# Patient Record
Sex: MALE | Race: WHITE | NOT HISPANIC OR LATINO | Employment: OTHER | URBAN - METROPOLITAN AREA
[De-identification: names, ages, dates, MRNs, and addresses within clinical notes are randomized per-mention and may not be internally consistent; named-entity substitution may affect disease eponyms.]

---

## 2017-02-15 ENCOUNTER — DOCUMENTATION ONLY (OUTPATIENT)
Dept: VASCULAR SURGERY | Facility: CLINIC | Age: 72
End: 2017-02-15

## 2017-02-21 DIAGNOSIS — I10 HYPERTENSION GOAL BP (BLOOD PRESSURE) < 140/90: ICD-10-CM

## 2017-02-21 NOTE — TELEPHONE ENCOUNTER
Potassium      Last Written Prescription Date: 07/08/2016  Last Fill Quantity: 90, # refills: 1  Last Office Visit with [facility] or [facility] prescribing provider: 11/18/2016       Potassium   Date Value Ref Range Status   11/18/2016 3.8 3.4 - 5.3 mmol/L Final     Creatinine   Date Value Ref Range Status   11/18/2016 1.01 0.66 - 1.25 mg/dL Final     BP Readings from Last 3 Encounters:   11/18/16 120/80   11/11/16 138/80   11/10/16 124/80

## 2017-02-24 RX ORDER — POTASSIUM CHLORIDE 1500 MG/1
TABLET, EXTENDED RELEASE ORAL
Qty: 90 TABLET | Refills: 1 | Status: SHIPPED | OUTPATIENT
Start: 2017-02-24 | End: 2017-04-10

## 2017-03-23 ENCOUNTER — OFFICE VISIT (OUTPATIENT)
Dept: FAMILY MEDICINE | Facility: CLINIC | Age: 72
End: 2017-03-23
Payer: MEDICARE

## 2017-03-23 VITALS
OXYGEN SATURATION: 98 % | WEIGHT: 182 LBS | DIASTOLIC BLOOD PRESSURE: 66 MMHG | TEMPERATURE: 97.6 F | BODY MASS INDEX: 27.58 KG/M2 | RESPIRATION RATE: 12 BRPM | HEIGHT: 68 IN | HEART RATE: 88 BPM | SYSTOLIC BLOOD PRESSURE: 122 MMHG

## 2017-03-23 DIAGNOSIS — H10.31 ACUTE BACTERIAL CONJUNCTIVITIS OF RIGHT EYE: Primary | ICD-10-CM

## 2017-03-23 PROCEDURE — 99213 OFFICE O/P EST LOW 20 MIN: CPT | Performed by: PHYSICIAN ASSISTANT

## 2017-03-23 RX ORDER — POLYMYXIN B SULFATE AND TRIMETHOPRIM 1; 10000 MG/ML; [USP'U]/ML
1 SOLUTION OPHTHALMIC EVERY 4 HOURS
Qty: 1 BOTTLE | Refills: 0 | Status: SHIPPED | OUTPATIENT
Start: 2017-03-23 | End: 2017-03-27

## 2017-03-23 NOTE — PATIENT INSTRUCTIONS
What Is Conjunctivitis?  Conjunctivitis is an irritation or infection. It affects the membrane that covers the white of your eye and the inside of your eyelid (conjunctiva). It can happen to one or both eyes. The membrane swells and the blood vessels enlarge (dilate). This makes your eye red. That's why conjunctivitis is sometimes called red eye or pink eye.    What are the symptoms?  If you have one or more of these symptoms, see an eye doctor:    Redness in and around your eye    Eyes that are puffy and sore    Itching, burning, or stinging eyes    Watery eyes or discharge from your eye    Eyelids that are crusty or stuck together when you wake up in the morning    Pink color in the whites of one or both eyes  Getting treatment quickly can help prevent damage to your eyes.  How is it diagnosed?  Conjunctivitis is usually a minor eye infection. But it can sometimes become a more serious problem. Some more serious eye diseases have symptoms that look like conjunctivitis. So it's important for an eye doctor to diagnose you. Your eye doctor will ask about your symptoms and any medicines you take. He or she will ask about any illnesses or medical conditions you may have. The doctor will also check your eyes with a hand-held light and a special microscope called a slit lamp.    3895-1459 [employer]. [address] [place] [address], [place] [address]. All rights reserved. This information is not intended as a substitute for professional medical care. Always follow your healthcare professional's instructions.

## 2017-03-23 NOTE — MR AVS SNAPSHOT
After Visit Summary   3/23/2017    Steven Duong    MRN: [id number]           Patient Information     Date Of Birth          1945        Visit Information        Provider Department      3/23/2017 3:00 PM Perla Gamboa PA-C [facility] [place]        Today's Diagnoses     Acute bacterial conjunctivitis of right eye    -  1      Care Instructions      What Is Conjunctivitis?  Conjunctivitis is an irritation or infection. It affects the membrane that covers the white of your eye and the inside of your eyelid (conjunctiva). It can happen to one or both eyes. The membrane swells and the blood vessels enlarge (dilate). This makes your eye red. That's why conjunctivitis is sometimes called red eye or pink eye.    What are the symptoms?  If you have one or more of these symptoms, see an eye doctor:    Redness in and around your eye    Eyes that are puffy and sore    Itching, burning, or stinging eyes    Watery eyes or discharge from your eye    Eyelids that are crusty or stuck together when you wake up in the morning    Pink color in the whites of one or both eyes  Getting treatment quickly can help prevent damage to your eyes.  How is it diagnosed?  Conjunctivitis is usually a minor eye infection. But it can sometimes become a more serious problem. Some more serious eye diseases have symptoms that look like conjunctivitis. So it's important for an eye doctor to diagnose you. Your eye doctor will ask about your symptoms and any medicines you take. He or she will ask about any illnesses or medical conditions you may have. The doctor will also check your eyes with a hand-held light and a special microscope called a slit lamp.    0386-0831 [employer]. [address] [place] [address], [place] [address]. All rights reserved. This information is not intended as a substitute for professional medical care. Always follow your healthcare professional's instructions.               "Follow-ups after your visit        Who to contact     If you have questions or need follow up information about today's clinic visit or your schedule please contact [facility] directly at [phone].  Normal or non-critical lab and imaging results will be communicated to you by MyChart, letter or phone within 4 business days after the clinic has received the results. If you do not hear from us within 7 days, please contact the clinic through [employer] or phone. If you have a critical or abnormal lab result, we will notify you by phone as soon as possible.  Submit refill requests through [employer] or call your pharmacy and they will forward the refill request to us. Please allow 3 business days for your refill to be completed.          Additional Information About Your Visit        [employer] Information     [employer] gives you secure access to your electronic health record. If you see a primary care provider, you can also send messages to your care team and make appointments. If you have questions, please call your primary care clinic.  If you do not have a primary care provider, please call [phone] and they will assist you.        Care EveryWhere ID     This is your Care EveryWhere ID. This could be used by other organizations to access your [facility] medical records  [id number]        Your Vitals Were     Pulse Temperature Respirations Height Pulse Oximetry BMI (Body Mass Index)    88 97.6  F (36.4  C) (Tympanic) 12 5' 8\" (1.727 m) 98% 27.67 kg/m2       Blood Pressure from Last 3 Encounters:   03/23/17 122/66   11/18/16 120/80   11/11/16 138/80    Weight from Last 3 Encounters:   03/23/17 182 lb (82.6 kg)   11/18/16 179 lb (81.2 kg)   11/11/16 181 lb (82.1 kg)              Today, you had the following     No orders found for display         Today's Medication Changes          These changes are accurate as of: 3/23/17  3:25 PM.  If you have any questions, ask your nurse or doctor.             "   Start taking these medicines.        Dose/Directions    trimethoprim-polymyxin b ophthalmic solution   Commonly known as:  POLYTRIM   Used for:  Acute bacterial conjunctivitis of right eye   Started by:  Perla Gamboa PA-C        Dose:  1 drop   Apply 1 drop to eye every 4 hours for 7 days   Quantity:  1 Bottle   Refills:  0            Where to get your medicines      These medications were sent to [facility] [address] [facility]  [address] [place] [address], [facility] [address]     Phone:  [phone]     trimethoprim-polymyxin b ophthalmic solution                Primary Care Provider Office Phone # Fax #    Nash Walker [phone]       [address] [address]        Thank you!     Thank you for choosing [facility]  for your care. Our goal is always to provide you with excellent care. Hearing back from our patients is one way we can continue to improve our services. Please take a few minutes to complete the written survey that you may receive in the mail after your visit with us. Thank you!             Your Updated Medication List - Protect others around you: Learn how to safely use, store and throw away your medicines at www.disposemymeds.org.          This list is accurate as of: 3/23/17  3:25 PM.  Always use your most recent med list.                   Brand Name Dispense Instructions for use    aspirin 81 MG tablet     100    ONE DAILY       atorvastatin 10 MG tablet    LIPITOR    90 tablet    Take 0.5 tablets (5 mg) by mouth daily       chlorthalidone 25 MG tablet    HYGROTON    90 tablet    TAKE 1 TABLET DAILY       fluticasone 50 MCG/ACT spray    FLONASE    48 g    Spray 1-2 sprays into both nostrils daily       losartan 50 MG tablet    COZAAR    90 tablet    Take 1 tablet (50 mg) by mouth daily       LUTEIN PO          multivitamin per tablet     100    ONE DAILY        potassium chloride 20 MEQ CR tablet   Generic drug:  potassium chloride SA     90 tablet    TAKE 1 TABLET DAILY       trimethoprim-polymyxin b ophthalmic solution    POLYTRIM    1 Bottle    Apply 1 drop to eye every 4 hours for 7 days       VITAMIN D PO      Take  by mouth.

## 2017-03-23 NOTE — NURSING NOTE
"Chief Complaint   Patient presents with     Eye Problem       Initial /66  Pulse 88  Temp 97.6  F (36.4  C) (Tympanic)  Resp 12  Ht 5' 8\" (1.727 m)  Wt 182 lb (82.6 kg)  SpO2 98%  BMI 27.67 kg/m2 Estimated body mass index is 27.67 kg/(m^2) as calculated from the following:    Height as of this encounter: 5' 8\" (1.727 m).    Weight as of this encounter: 182 lb (82.6 kg).  Medication Reconciliation: complete   Isa Bloedow LPN    "

## 2017-03-23 NOTE — PROGRESS NOTES
SUBJECTIVE:                                                    Steven Duong is a 71 year old male who presents to clinic today for the following health issues:       Eye(s) Problem     Onset: x 1 day    Description:   Location: right  Pain: NO, but he does feel discomfort like he has an eyelash in his eye.   Redness: no    Accompanying Signs & Symptoms:  Discharge/mattering: YES  Swelling: YES  Visual changes: no  Fever: no  Nasal Congestion: no  Bothered by bright lights: no     History:   Trauma: no   Foreign body exposure: no     Precipitating factors:   Wearing contacts: no    Alleviating factors:  Improved by: saline       Therapies Tried and outcome: Saline with improvedment      Problem list and histories reviewed & adjusted, as indicated.  Additional history: as documented    Patient Active Problem List   Diagnosis     Meniere's disease     Seasonal allergic rhinitis     Family history of coronary artery disease     Hyperlipidemia LDL goal <130     Hypertension goal BP (blood pressure) < 140/90     Advanced directives, counseling/discussion     Personal history of prostate cancer - S/p prostatectomy at 54 yo (2001)     Prediabetes     Chronic right-sided low back pain with right-sided sciatica     Past Surgical History:   Procedure Laterality Date     SUPRAPUBIC PROSTATECTOMY  2001     TONSILLECTOMY  1953       Social History   Substance Use Topics     Smoking status: Never Smoker     Smokeless tobacco: Never Used     Alcohol use 7.0 oz/week      Comment: 1-2 beers nightly     Family History   Problem Relation Age of Onset     Coronary Artery Disease Father 64     bypass x 2 (initial at 65 yo)     Coronary Artery Disease Paternal Grandfather      CEREBROVASCULAR DISEASE Mother      with associated parkinsons     Colon Cancer No family hx of      Prostate Cancer Father 70     Melanoma Daughter      melanoma     Breast Cancer Daughter          Current Outpatient Prescriptions   Medication Sig Dispense  Refill     POTASSIUM CHLORIDE 20 MEQ CR tablet TAKE 1 TABLET DAILY 90 tablet 1     chlorthalidone (HYGROTON) 25 MG tablet TAKE 1 TABLET DAILY 90 tablet 1     atorvastatin (LIPITOR) 10 MG tablet Take 0.5 tablets (5 mg) by mouth daily 90 tablet 3     chlorthalidone (HYGROTON) 25 MG tablet Take 1 tablet (25 mg) by mouth daily 90 tablet 3     losartan (COZAAR) 50 MG tablet Take 1 tablet (50 mg) by mouth daily 90 tablet 3     Cholecalciferol (VITAMIN D PO) Take  by mouth.       ASPIRIN 81 MG OR TABS ONE DAILY 100 3     MULTIVITAMINS OR TABS ONE DAILY 100 1 YEAR     fluticasone (FLONASE) 50 MCG/ACT nasal spray Spray 1-2 sprays into both nostrils daily 48 g 3     LUTEIN PO        Allergies   Allergen Reactions     Penicillin [Esters]      Throat swelling     Penicillins      Other reaction(s): Throat Swelling/Closing       Reviewed and updated as needed this visit by clinical staff       Reviewed and updated as needed this visit by Provider       ROS:  C: NEGATIVE for fever, chills, change in weight  INTEGUMENTARY/SKIN: NEGATIVE for worrisome rashes, moles or lesions  EYE: POSITIVE for eye swelling and discharge  E/M: NEGATIVE for sore throat, ear or sinus problems  R: NEGATIVE for cough  CV: NEGATIVE for chest pain, palpitations or peripheral edema  GI: NEGATIVE for nausea, abdominal pain, heartburn, or change in bowel habits  : NEGATIVE for dysuria, hematuria, decreased urinary stream or discharge  MUSCULOSKELETAL: NEGATIVE for significant arthralgias or myalgia  NEURO: NEGATIVE for weakness, dizziness or paresthesias  ENDOCRINE: NEGATIVE for cold intolerance, HX diabetes and HX thyroid disease  HEME/ALLERGY/IMMUNE: NEGATIVE for swollen nodes      OBJECTIVE:                                                      GENERAL: healthy, alert and no distress  EYES: PERRL, EOMI and conjunctiva/corneas- conjunctival injection right. Flouroscien stain negative.   HENT: normal cephalic/atraumatic, ear canals and TM's normal, nose  and mouth without ulcers or lesions, oropharynx clear and oral mucous membranes moist  NECK: no adenopathy, no asymmetry, masses, or scars and thyroid normal to palpation  RESP: lungs clear to auscultation - no rales, rhonchi or wheezes  CV: regular rate and rhythm, normal S1 S2, no S3 or S4, no murmur, click or rub, no peripheral edema and peripheral pulses strong  MS: no gross musculoskeletal defects noted, no edema    Diagnostic Test Results:  none      ASSESSMENT/PLAN:                                                    1. Acute bacterial conjunctivitis of right eye  Most likely viral pink eye at this time, patient's symptoms consistent with viral. High incidence of bacterial coinfection. Can also use lubricating drops (Systane.) Practice good hand hygiene, wash all pillowcases and towels. Spoke about contagious nature of pink eye. May use drops between 5-7 days.     - trimethoprim-polymyxin b (POLYTRIM) ophthalmic solution; Apply 1 drop to eye every 4 hours for 7 days  Dispense: 1 Bottle; Refill: 0    I have discussed any lab or imaging results, the patient's diagnosis, and my plan of treatment with the patient and/or family. Patient is aware to come back in with worsening symptoms or if no relief despite treatment plan.  Patient voiced understanding and had no further questions.     Perla Gamboa PA-C  [facility] [place]

## 2017-03-27 ENCOUNTER — OFFICE VISIT (OUTPATIENT)
Dept: FAMILY MEDICINE | Facility: CLINIC | Age: 72
End: 2017-03-27
Payer: MEDICARE

## 2017-03-27 VITALS
HEIGHT: 68 IN | TEMPERATURE: 98 F | HEART RATE: 92 BPM | WEIGHT: 180 LBS | DIASTOLIC BLOOD PRESSURE: 78 MMHG | BODY MASS INDEX: 27.28 KG/M2 | SYSTOLIC BLOOD PRESSURE: 110 MMHG | OXYGEN SATURATION: 96 %

## 2017-03-27 DIAGNOSIS — H10.021 PINK EYE DISEASE OF RIGHT EYE: ICD-10-CM

## 2017-03-27 DIAGNOSIS — H00.021 HORDEOLUM INTERNUM OF RIGHT UPPER EYELID: Primary | ICD-10-CM

## 2017-03-27 PROCEDURE — 99213 OFFICE O/P EST LOW 20 MIN: CPT | Performed by: FAMILY MEDICINE

## 2017-03-27 RX ORDER — GENTAMICIN SULFATE 3 MG/ML
1 SOLUTION/ DROPS OPHTHALMIC EVERY 4 HOURS
Qty: 5 ML | Refills: 0 | Status: SHIPPED | OUTPATIENT
Start: 2017-03-27 | End: 2017-04-03

## 2017-03-27 ASSESSMENT — ANXIETY QUESTIONNAIRES

## 2017-03-27 ASSESSMENT — PATIENT HEALTH QUESTIONNAIRE - PHQ9: 5. POOR APPETITE OR OVEREATING: NOT AT ALL

## 2017-03-27 NOTE — NURSING NOTE
"Chief Complaint   Patient presents with     RECHECK       Initial /78 (BP Location: Right arm, Patient Position: Chair, Cuff Size: Adult Large)  Pulse 92  Temp 98  F (36.7  C) (Oral)  Ht 5' 8\" (1.727 m)  Wt 180 lb (81.6 kg)  SpO2 96%  BMI 27.37 kg/m2 Estimated body mass index is 27.37 kg/(m^2) as calculated from the following:    Height as of this encounter: 5' 8\" (1.727 m).    Weight as of this encounter: 180 lb (81.6 kg).  Medication Reconciliation: complete  "

## 2017-03-27 NOTE — PROGRESS NOTES
"  SUBJECTIVE:                                                    Steven Duong is a 72 year old male who presents to clinic today for the following health issues:    Recheck right eye LOV 03/23/2017 4 days go- Reports at onset eye felt as if there was something in it- pt using the drops but symptoms are worsened- red swollen and crusty, patient has been washing eye with warm water in the morning, reports he has had allergies and intermittent sinus symptoms the last few weeks, no trauma, no symptoms of left eye    Problem list and histories reviewed & adjusted, as indicated.  Additional history: as documented    ROS:  Constitutional, HEENT, cardiovascular, pulmonary, GI, , musculoskeletal, neuro, skin, endocrine and psych systems are negative, except as otherwise noted.    This document serves as a record of the services and decisions personally performed and made by Nash Walker MD. It was created on his behalf by Marifer Lew, a trained medical scribe. The creation of this document is based the provider's statements to the medical scribe.  Marifer Lew   OBJECTIVE:                                                    /78 (BP Location: Right arm, Patient Position: Chair, Cuff Size: Adult Large)  Pulse 92  Temp 98  F (36.7  C) (Oral)  Ht 1.727 m (5' 8\")  Wt 81.6 kg (180 lb)  SpO2 96%  BMI 27.37 kg/m2 Body mass index is 27.37 kg/(m^2).   GENERAL: healthy, alert, well nourished, well hydrated, no distress  EYES: edema and mild erythema of right upper lid edema, otherwise, Eyes grossly normal to inspection, extraocular movements - intact  PSYCH: Alert and oriented times 3; speech- coherent , normal rate and volume; able to articulate logical thoughts, able to abstract reason, no tangential thoughts, no hallucinations or delusions, affect- normal  Diagnostic test results: none      ASSESSMENT/PLAN:         Steven was seen today for recheck.    Diagnoses and all orders for this visit:    Francisco Javier " internum of right upper eyelid  Pink eye disease of right eye: Advised to use warm compress a few times a day and sleep propped up to help alleviate symptoms. Patient given a handout on eyelid care.  -     gentamicin (GARAMYCIN) 0.3 % ophthalmic solution; Apply 1 drop to eye every 4 hours for 7 days    Risks, benefits and alternatives of treatments discussed. Plan agreed on.      Followup: prn    Will call, return to clinic, or go to ED if worsening or symptoms not improving as discussed.    See patient instructions.     Health Maintenance Topics with due status: Overdue       Topic Date Due    ANSELMO QUESTIONNAIRE 1 YEAR 1945    PHQ-9 Q1YR (NO INBASKET) 1945       Health maintenance reviewed/updated? Yes    The information in this document, created by the medical scribe for me, accurately reflects the services I personally performed and the decisions made by me. I have reviewed and approved this document for accuracy prior to leaving the patient care area.  Nash Walker MD March 27, 2017 2:27 PM        Joaquin Walker MD

## 2017-03-27 NOTE — MR AVS SNAPSHOT
After Visit Summary   3/27/2017    Steven Duong    MRN: [id number]           Patient Information     Date Of Birth          1945        Visit Information        Provider Department      3/27/2017 2:00 PM Nash Walker MD [facility] [place]        Today's Diagnoses     Hordeolum internum of right upper eyelid    -  1    Pink eye disease of right eye          Care Instructions      Treating Blepharitis: Self-Care  To treat the problem, keep your eyelids clean. Warm compresses can reduce redness and swelling, and help clean your eyelids, too. You may also need to wash the area gently with an eyelid scrub when you wake up.    To apply a warm compress:  1. Wash your hands with soap and warm water.  2. Wet a clean washcloth with warm water. Then wring it out.  3. Close your eyes and place the washcloth over your eyelids for 3 to 5 minutes. This helps loosen scales or crusts.  4. Wet the washcloth again as often as needed to keep it warm.  Repeat 2 or more times a day. Use a clean washcloth each time.    To use an eyelid scrub:  1. Wash your hands with soap and warm water.  2. Use a ready-made eyelid scrub. Or mix 3 drops of baby shampoo in 1/4 cup of warm water.  3. Dip a lint-free pad, cotton swab, or clean washcloth in the scrub.  4. Close one eye and gently scrub the base of the eyelid.  5. Rinse the lid in cool water and dry with a clean towel.  6. Repeat on your other eye.    4901-8886 [employer]. [address] [place], [place] [address]. All rights reserved. This information is not intended as a substitute for professional medical care. Always follow your healthcare professional's instructions.              Follow-ups after your visit        Follow-up notes from your care team     Return in about 1 week (around 4/3/2017), or if symptoms worsen or fail to improve.      Who to contact     If you have questions or need follow up information about today's clinic visit  "or your schedule please contact [facility] directly at [phone].  Normal or non-critical lab and imaging results will be communicated to you by MyChart, letter or phone within 4 business days after the clinic has received the results. If you do not hear from us within 7 days, please contact the clinic through [employer] or phone. If you have a critical or abnormal lab result, we will notify you by phone as soon as possible.  Submit refill requests through [employer] or call your pharmacy and they will forward the refill request to us. Please allow 3 business days for your refill to be completed.          Additional Information About Your Visit        [employer] Information     [employer] gives you secure access to your electronic health record. If you see a primary care provider, you can also send messages to your care team and make appointments. If you have questions, please call your primary care clinic.  If you do not have a primary care provider, please call [phone] and they will assist you.        Care EveryWhere ID     This is your Care EveryWhere ID. This could be used by other organizations to access your [facility] medical records  [id number]        Your Vitals Were     Pulse Temperature Height Pulse Oximetry BMI (Body Mass Index)       92 98  F (36.7  C) (Oral) 5' 8\" (1.727 m) 96% 27.37 kg/m2        Blood Pressure from Last 3 Encounters:   03/27/17 110/78   03/23/17 122/66   11/18/16 120/80    Weight from Last 3 Encounters:   03/27/17 180 lb (81.6 kg)   03/23/17 182 lb (82.6 kg)   11/18/16 179 lb (81.2 kg)              Today, you had the following     No orders found for display         Today's Medication Changes          These changes are accurate as of: 3/27/17  2:37 PM.  If you have any questions, ask your nurse or doctor.               Start taking these medicines.        Dose/Directions    gentamicin 0.3 % ophthalmic solution   Commonly known as:  GARAMYCIN   Used for:  Pink eye disease " of right eye, Hordeolum internum of right upper eyelid   Started by:  Nash Walker MD        Dose:  1 drop   Apply 1 drop to eye every 4 hours for 7 days   Quantity:  5 mL   Refills:  0         Stop taking these medicines if you haven't already. Please contact your care team if you have questions.     trimethoprim-polymyxin b ophthalmic solution   Commonly known as:  POLYTRIM   Stopped by:  Nash Walker MD                Where to get your medicines      These medications were sent to [facility] - [address] [address]     Phone:  [phone]     gentamicin 0.3 % ophthalmic solution                Primary Care Provider Office Phone # Fax #    Nash Walker [phone]       [address] [address]        Thank you!     Thank you for choosing [facility]  for your care. Our goal is always to provide you with excellent care. Hearing back from our patients is one way we can continue to improve our services. Please take a few minutes to complete the written survey that you may receive in the mail after your visit with us. Thank you!             Your Updated Medication List - Protect others around you: Learn how to safely use, store and throw away your medicines at www.disposemymeds.org.          This list is accurate as of: 3/27/17  2:37 PM.  Always use your most recent med list.                   Brand Name Dispense Instructions for use    aspirin 81 MG tablet     100    ONE DAILY       atorvastatin 10 MG tablet    LIPITOR    90 tablet    Take 0.5 tablets (5 mg) by mouth daily       chlorthalidone 25 MG tablet    HYGROTON    90 tablet    TAKE 1 TABLET DAILY       fluticasone 50 MCG/ACT spray    FLONASE    48 g    Spray 1-2 sprays into both nostrils daily       gentamicin 0.3 % ophthalmic solution    GARAMYCIN    5 mL    Apply 1 drop to eye  every 4 hours for 7 days       losartan 50 MG tablet    COZAAR    90 tablet    Take 1 tablet (50 mg) by mouth daily       LUTEIN PO          multivitamin per tablet     100    ONE DAILY       potassium chloride 20 MEQ CR tablet   Generic drug:  potassium chloride SA     90 tablet    TAKE 1 TABLET DAILY       VITAMIN D PO      Take  by mouth.

## 2017-03-28 ASSESSMENT — ANXIETY QUESTIONNAIRES: GAD7 TOTAL SCORE: 0

## 2017-03-28 ASSESSMENT — PATIENT HEALTH QUESTIONNAIRE - PHQ9: SUM OF ALL RESPONSES TO PHQ QUESTIONS 1-9: 1

## 2017-04-07 DIAGNOSIS — I10 HYPERTENSION GOAL BP (BLOOD PRESSURE) < 140/90: ICD-10-CM

## 2017-04-07 RX ORDER — LOSARTAN POTASSIUM 50 MG/1
TABLET ORAL
Qty: 90 TABLET | Refills: 2 | OUTPATIENT
Start: 2017-04-07

## 2017-04-10 DIAGNOSIS — E78.5 HYPERLIPIDEMIA LDL GOAL <130: ICD-10-CM

## 2017-04-10 DIAGNOSIS — I10 HYPERTENSION GOAL BP (BLOOD PRESSURE) < 140/90: ICD-10-CM

## 2017-04-10 DIAGNOSIS — I10 ESSENTIAL HYPERTENSION WITH GOAL BLOOD PRESSURE LESS THAN 140/90: ICD-10-CM

## 2017-04-10 NOTE — TELEPHONE ENCOUNTER
Please change all 4 of these to [facility].   Also needs: Atorvastatin 10 mg.  Chlorthalidone 25 mg, Losartan 50 MG, & Potassium 20 MEQ CR     [phone], if any questions

## 2017-04-11 NOTE — TELEPHONE ENCOUNTER
Losartan      Last Written Prescription Date: 11/18/2016  Last Fill Quantity: 90, # refills: 3  Last Office Visit with [facility], [facility] or  Health prescribing provider: 03/27/2017       Potassium   Date Value Ref Range Status   11/18/2016 3.8 3.4 - 5.3 mmol/L Final     Creatinine   Date Value Ref Range Status   11/18/2016 1.01 0.66 - 1.25 mg/dL Final     BP Readings from Last 3 Encounters:   03/27/17 110/78   03/23/17 122/66   11/18/16 120/80       Atorvastain     Last Written Prescription Date: 11/18/2016  Last Fill Quantity: 90, # refills: 3  Last Office Visit with [facility], [facility] or  Health prescribing provider: 03/27/2017       Lab Results   Component Value Date    CHOL 162 11/18/2016     Lab Results   Component Value Date    HDL 61 11/18/2016     Lab Results   Component Value Date    LDL 86 11/18/2016     Lab Results   Component Value Date    TRIG 74 11/18/2016     Lab Results   Component Value Date    CHOLHDLRATIO 2.8 11/16/2015       Potassium      Last Written Prescription Date: 02/24/2017  Last Fill Quantity: 90, # refills: 1  Last Office Visit with [facility], [facility] or [facility] prescribing provider: 03/27/2017       Potassium   Date Value Ref Range Status   11/18/2016 3.8 3.4 - 5.3 mmol/L Final     Creatinine   Date Value Ref Range Status   11/18/2016 1.01 0.66 - 1.25 mg/dL Final     BP Readings from Last 3 Encounters:   03/27/17 110/78   03/23/17 122/66   11/18/16 120/80     Hygroton      Last Written Prescription Date: 12/30/2016  Last Fill Quantity: 90, # refills: 1  Last Office Visit with [facility], [facility] or  Health prescribing provider: 03/27/2017       Potassium   Date Value Ref Range Status   11/18/2016 3.8 3.4 - 5.3 mmol/L Final     Creatinine   Date Value Ref Range Status   11/18/2016 1.01 0.66 - 1.25 mg/dL Final     BP Readings from Last 3 Encounters:   03/27/17 110/78   03/23/17 122/66   11/18/16 120/80

## 2017-04-12 RX ORDER — LOSARTAN POTASSIUM 50 MG/1
50 TABLET ORAL DAILY
Qty: 90 TABLET | Refills: 3 | Status: SHIPPED | OUTPATIENT
Start: 2017-04-12 | End: 2017-06-09

## 2017-04-12 RX ORDER — POTASSIUM CHLORIDE 1500 MG/1
20 TABLET, EXTENDED RELEASE ORAL DAILY
Qty: 90 TABLET | Refills: 1 | Status: SHIPPED | OUTPATIENT
Start: 2017-04-12 | End: 2017-11-05

## 2017-04-12 RX ORDER — ATORVASTATIN CALCIUM 10 MG/1
5 TABLET, FILM COATED ORAL DAILY
Qty: 90 TABLET | Refills: 3 | Status: SHIPPED | OUTPATIENT
Start: 2017-04-12 | End: 2018-01-16

## 2017-04-12 RX ORDER — CHLORTHALIDONE 25 MG/1
25 TABLET ORAL DAILY
Qty: 90 TABLET | Refills: 1 | Status: SHIPPED | OUTPATIENT
Start: 2017-04-12 | End: 2017-06-09

## 2017-04-12 NOTE — TELEPHONE ENCOUNTER
Prescription approved per [facility] Refill Protocol.    Kely Silvestre RN    [facility] [place]

## 2017-05-16 ENCOUNTER — TRANSFERRED RECORDS (OUTPATIENT)
Dept: HEALTH INFORMATION MANAGEMENT | Facility: CLINIC | Age: 72
End: 2017-05-16

## 2017-06-09 ENCOUNTER — TELEPHONE (OUTPATIENT)
Dept: FAMILY MEDICINE | Facility: CLINIC | Age: 72
End: 2017-06-09

## 2017-06-09 DIAGNOSIS — I10 HYPERTENSION GOAL BP (BLOOD PRESSURE) < 140/90: ICD-10-CM

## 2017-06-09 DIAGNOSIS — I10 ESSENTIAL HYPERTENSION WITH GOAL BLOOD PRESSURE LESS THAN 140/90: ICD-10-CM

## 2017-06-09 RX ORDER — LOSARTAN POTASSIUM 50 MG/1
50 TABLET ORAL DAILY
Qty: 30 TABLET | Refills: 0 | Status: SHIPPED | OUTPATIENT
Start: 2017-06-09 | End: 2018-01-16

## 2017-06-09 RX ORDER — CHLORTHALIDONE 25 MG/1
25 TABLET ORAL DAILY
Qty: 30 TABLET | Refills: 0 | Status: SHIPPED | OUTPATIENT
Start: 2017-06-09 | End: 2018-01-16

## 2017-06-09 NOTE — TELEPHONE ENCOUNTER
Amarjit wife calling he is out of town and out of state. He   Just got notified that his daughter has stage 4 cancer   She doesn't want him to go with out his meds.  Please call her at [phone].

## 2017-06-09 NOTE — TELEPHONE ENCOUNTER
Put 30 days through to Shi in [facility]. Spoke to pts wife at .    Aide Nguyen RN

## 2017-07-03 DIAGNOSIS — J30.2 SEASONAL ALLERGIC RHINITIS: Primary | ICD-10-CM

## 2017-07-03 RX ORDER — FLUTICASONE PROPIONATE 50 MCG
1-2 SPRAY, SUSPENSION (ML) NASAL DAILY
Qty: 48 G | Refills: 3 | Status: SHIPPED | OUTPATIENT
Start: 2017-07-03 | End: 2018-01-16

## 2017-07-03 NOTE — TELEPHONE ENCOUNTER
Flonase      Last Written Prescription Date: 11/18/2016  Last Fill Quantity: 48g,  # refills: 3   Last Office Visit with [facility], [facility] or [facility] prescribing provider: 03/27/2017

## 2017-07-03 NOTE — TELEPHONE ENCOUNTER
Reason for Call:  Other prescription - refill of fluticasone propionate    Detailed comments: Patient requesting a refill of fluticasone.    Phone Number Patient can be reached at: Cell number on file:    Telephone Information:   Mobile [phone]       Best Time: any    Can we leave a detailed message on this number? YES    Call taken on 7/3/2017 at 10:31 AM by Viridiana Triplett

## 2017-11-05 DIAGNOSIS — I10 HYPERTENSION GOAL BP (BLOOD PRESSURE) < 140/90: ICD-10-CM

## 2017-11-06 RX ORDER — POTASSIUM CHLORIDE 1500 MG/1
TABLET, EXTENDED RELEASE ORAL
Qty: 90 TABLET | Refills: 0 | Status: SHIPPED | OUTPATIENT
Start: 2017-11-06 | End: 2018-01-16

## 2017-11-06 NOTE — TELEPHONE ENCOUNTER
lov 3/27/17.    Medication is being filled for 1 time refill only due to:  Patient needs to be seen because due for phx.   Aide Nguyen RN  [facility]

## 2018-01-12 NOTE — PROGRESS NOTES
SUBJECTIVE:   Steven Duong is a 72 year old male who presents for Preventive Visit.    Are you in the first 12 months of your Medicare Part B coverage?  No    Healthy Habits:    Do you get at least three servings of calcium containing foods daily (dairy, green leafy vegetables, etc.)? yes    Amount of exercise or daily activities, outside of work: 3-4 day(s) per week    Problems taking medications regularly No    Medication side effects: No    Have you had an eye exam in the past two years? yes    Do you see a dentist twice per year? yes    Do you have sleep apnea, excessive snoring or daytime drowsiness?no      Ability to successfully perform activities of daily living: Yes, no assistance needed    Home safety:  none identified     Hearing impairment: Yes, hearing aid in right ear    Fall risk:  Fallen 2 or more times in the past year?: No  Any fall with injury in the past year?: No      click delete button to remove this line now  COGNITIVE SCREEN  1) Repeat 3 items (Banana, Sunrise, Chair)    2) Clock draw: NORMAL  3) 3 item recall: Recalls 3 objects  Results: 3 items recalled: COGNITIVE IMPAIRMENT LESS LIKELY    Mini-CogTM Copyright S [employer]. Licensed by the author for use in [facility] [employer]; reprinted with permission ([email or web address]). All rights reserved.          Hyperlipidemia Follow-Up    Rate your low fat/cholesterol diet?: good    Taking statin?  Yes, no muscle aches from statin    Other lipid medications/supplements?:  none    - Labs pending; Pt is doing well on medication, has no side effect complaints.      Hypertension Follow-up    Outpatient blood pressures are not being checked.    Low Salt Diet: no added salt    - Labs pending; Pt is doing well on medication, BP measuring 110/72 at today's visit. He remains active 1-2x/week and eat a healthy diet each day.      Seasonal allergic rhinitis -- Pt treats his symptoms with Flonase & finds this is successful.     Meniere's Disease -- Pt states  this condition has resolved since he adopted a low salt diet, no longer experiences episodes of vertigo.         Reviewed and updated as needed this visit by clinical staff  Tobacco  Allergies  Meds  Problems  Med Hx  Surg Hx  Fam Hx  Soc Hx        Reviewed and updated as needed this visit by Provider  Allergies  Meds  Problems  Surg Hx  Fam Hx        Social History   Substance Use Topics     Smoking status: Never Smoker     Smokeless tobacco: Never Used     Alcohol use 7.0 oz/week      Comment: 1-2 beers nightly     If you drink alcohol do you typically have >3 drinks per day or >7 drinks per week? No                        Today's PHQ-2 Score:   PHQ-2 ( 1999 [employer]) 1/16/2018 3/27/2017   Q1: Little interest or pleasure in doing things 0 0   Q2: Feeling down, depressed or hopeless 0 0   PHQ-2 Score 0 0     Do you feel safe in your environment - Yes    Do you have a Health Care Directive?: Yes: Patient states has Advance Directive and will bring in a copy to clinic.    Current providers sharing in care for this patient include:   Patient Care Team:  Nash Walker MD as PCP - General    The following health maintenance items are reviewed in Epic and correct as of today:  Health Maintenance   Topic Date Due     INFLUENZA VACCINE (SYSTEM ASSIGNED)  09/01/2017     BMP Q1 YR  11/18/2017     FALL RISK ASSESSMENT  11/18/2017     LIPID MONITORING Q1 YEAR  11/18/2017     MICROALBUMIN Q1 YEAR  11/18/2017     PSA Q1 YR  11/18/2017     ANSELMO QUESTIONNAIRE 1 YEAR  03/27/2018     PHQ-9 Q1YR  03/27/2018     COLONOSCOPY Q10 YR  05/09/2018     WELLNESS VISIT Q1 YR  01/16/2019     TETANUS Q10 YR  01/12/2022     ADVANCE DIRECTIVE PLANNING Q5 YRS  01/16/2023     PNEUMOCOCCAL  Completed     AORTIC ANEURYSM SCREENING (SYSTEM ASSIGNED)  Completed     HEPATITIS C SCREENING  Completed     Labs reviewed in EPIC      ROS:  Constitutional, HEENT, cardiovascular, pulmonary, GI, , musculoskeletal, neuro, skin, endocrine and psych  "systems are negative, except as otherwise noted.    This document serves as a record of the services and decisions personally performed and made by Nash Walker MD. It was created on his behalf by Greta Perez, a trained medical scribe. The creation of this document is based the provider's statements to the medical scribe.  Scribe Greta Perez 8:29 AM, January 16, 2018    OBJECTIVE:   /72 (BP Location: Right arm, Patient Position: Sitting, Cuff Size: Adult Large)  Pulse 76  Temp 97.8  F (36.6  C) (Oral)  Ht 1.727 m (5' 8\")  Wt 80.7 kg (178 lb)  SpO2 100%  BMI 27.06 kg/m2 Estimated body mass index is 27.06 kg/(m^2) as calculated from the following:    Height as of this encounter: 1.727 m (5' 8\").    Weight as of this encounter: 80.7 kg (178 lb).  EXAM:   GENERAL: healthy, alert and no distress  EYES: Eyes grossly normal to inspection, PERRL and conjunctivae and sclerae normal  HENT: ear canals and TM's normal, nose and mouth without ulcers or lesions  NECK: no adenopathy, no asymmetry, masses, or scars and thyroid normal to palpation  RESP: lungs clear to auscultation - no rales, rhonchi or wheezes  BREAST: normal without masses, tenderness or nipple discharge and no palpable axillary masses or adenopathy  CV: regular rate and rhythm, normal S1 S2, no S3 or S4, no murmur, click or rub, no peripheral edema and peripheral pulses strong  ABDOMEN: soft, nontender, no hepatosplenomegaly, no masses and bowel sounds normal   (male): testicles normal without atrophy or masses, no hernias and penis normal without urethral discharge  MS: no gross musculoskeletal defects noted, no edema  SKIN: no suspicious lesions or rashes  NEURO: Normal strength and tone, mentation intact and speech normal  PSYCH: mentation appears normal, affect normal/bright    ASSESSMENT / PLAN:   Steven was seen today for wellness visit.    Diagnoses and all orders for this visit:    Medicare annual wellness visit, subsequent - Labs " "pending  -     CBC with platelets  -     Comprehensive metabolic panel  -     cholecalciferol (VITAMIN D3) 1000 UNIT tablet; Take 1 tablet (1,000 Units) by mouth daily    h/o prostate cancer (H) - S/p prostatectomy at 54 yo (2001) - Labs pending  -     PSA, tumor marker    Hypertension goal BP (blood pressure) < 140/90/Essential hypertension with goal blood pressure less than 140/90 - Labs pending; Pt is doing well on medication, with BP measuring 110/72 at today's visit.   -     Albumin Random Urine Quantitative with Creat Ratio  -     losartan (COZAAR) 50 MG tablet; Take 1 tablet (50 mg) by mouth daily  -     chlorthalidone (HYGROTON) 25 MG tablet; Take 1 tablet (25 mg) by mouth daily  -     potassium chloride SA (KLOR-CON) 20 MEQ CR tablet; Take 1 tablet (20 mEq) by mouth daily  -     Comprehensive metabolic panel    Hyperlipidemia LDL goal <130 - Labs pending; Pt is doing well on medication, has no side effect complaints  -     Lipid panel reflex to direct LDL Fasting  -     atorvastatin (LIPITOR) 10 MG tablet; Take 0.5 tablets (5 mg) by mouth daily  -     Comprehensive metabolic panel    Seasonal allergic rhinitis, unspecified chronicity, unspecified trigger - Pt is managing well with medication, uses when needed  -     fluticasone (FLONASE) 50 MCG/ACT spray; Spray 1-2 sprays into both nostrils daily    Screening for colon cancer - Colonoscopy ordered, Pt will schedule  -     GASTROENTEROLOGY ADULT REF PROCEDURE ONLY    At risk for falling - No concerns today    Pt declined influenza vaccination at today's visit.        End of Life Planning:  Patient currently has an advanced directive: Yes.  Practitioner is supportive of decision.      COUNSELING:  Reviewed preventive health counseling, as reflected in patient instructions  Estimated body mass index is 27.06 kg/(m^2) as calculated from the following:    Height as of this encounter: 1.727 m (5' 8\").    Weight as of this encounter: 80.7 kg (178 lb).   reports " that he has never smoked. He has never used smokeless tobacco.        Appropriate preventive services were discussed with this patient, including applicable screening as appropriate for cardiovascular disease, diabetes, osteopenia/osteoporosis, and glaucoma.  As appropriate for age/gender, discussed screening for colorectal cancer, prostate cancer, breast cancer, and cervical cancer. Checklist reviewing preventive services available has been given to the patient.    Reviewed patients plan of care and provided an AVS. The Basic Care Plan (routine screening as documented in Health Maintenance) for Steven meets the Care Plan requirement. This Care Plan has been established and reviewed with the Patient.    Counseling Resources:  ATP IV Guidelines  Pooled Cohorts Equation Calculator  Breast Cancer Risk Calculator  FRAX Risk Assessment  ICSI Preventive Guidelines  Dietary Guidelines for Americans, 2010  [employer] MyPlate  ASA Prophylaxis  Lung CA Screening      The information in this document, created by the medical scribe for me, accurately reflects the services I personally performed and the decisions made by me. I have reviewed and approved this document for accuracy prior to leaving the patient care area.  8:29 AM, 01/16/18    Nash Walker MD  [facility]

## 2018-01-12 NOTE — PATIENT INSTRUCTIONS
Services Typically covered by Medicare Recommended Completed   Vaccines    Pneumonoccol    Influenza    Hepatitis B (if medium/high risk)     Once for patients after age 65    Yearly  Medium/high risk factors:    End Stage Kidney Disease    Hemophiliacs who received Factor XIII or IX concentrates    Clients of institutions for developmentally disabled    Persons who live in same house as a Hepatitis B carrier    Homosexual men    Illicit injectable drug users    Health care workers     Mammogram Covered: One-time screen between age 35-39, annually for age 40+     Pap and Pelvic Exam Covered: Annually if  high risk,  or childbearing age with abnormal Pap in last 3 years.  Q24 months for all other women     Prostate Cancer Screening    Digital rectal exam    PSA Covered: Annually for all men > age 50     Corolrectal Cancer Screening Screening colonoscopy every 10 years, more often for high risk patients     Diabetes Self-Management Training Requires referral by treating physician for patient with diabetes     Diabetes Screening    Fasting blood sugar or glucose tolerance test   Once yearly, twice yearly if prediabetic     Cardiovascular Screening Blood Tests    Total Cholesterol    HDL    Triglycerides Every 5 years     Medical Nutrition Therapy for Diabetes or Renal Disease Requires referral by treating physician for patient with diabetes or kidney disease     Glaucoma Screening Annually for patients with one of the following risk factors:    Diabetes Mellitus    Family history of Glaucoma    -American age 50 and over    -American age 65 and over     Bone Mass Measurement Every 24 months if one of the following risk factors:    Estrogen deficiency    Vertebral abnormalities on x-ray indicative of Osteoporosis, Osteopenia, or Vertebral fracture    Receiving/expected to receive the equivalent of at least 5 mg of Prednisone per day for > 3 months    Hyperparathyroidism    Patient being monitored for  response to Osteoporosis Therapy     One-time AAA screen  Must be ordered as part of Medicare IPPE   Any patient with a family history of AAA    Males Age 65-75, with history of smoking at least 100 cigarettes in lifetime     Smoking Cessation Counseling Beneficiaries who use tobacco are eligible to receive 2 cessation attempts per year; each attempt includes maximum of 4 sessions     HIV Screening Annually for beneficiaries at increased risk:       Increased risk for HIV infection is defined in the  National Coverage Determinations (NCD) Manual,  Publication 100-03 Sections 190.14 (diagnostic) and 210.7 (screening). See http://www.cms.gov/manuals/downloads/qwa663c3_Dycf9.pdf and http://www.cms.gov/manuals/downloads/xmr210l8_Jggn4.pdf on the Internet.  Three times per pregnancy for beneficiaries who are pregnant.     Future Annual Wellness Visit Annually, for all beneficiaries.       Preventive Health Recommendations:       Male Ages 65 and over    Yearly exam:             See your health care provider every year in order to  o   Review health changes.   o   Discuss preventive care.    o   Review your medicines if your doctor has prescribed any.    Talk with your health care provider about whether you should have a test to screen for prostate cancer (PSA).    Every 3 years, have a diabetes test (fasting glucose). If you are at risk for diabetes, you should have this test more often.    Every 5 years, have a cholesterol test. Have this test more often if you are at risk for high cholesterol or heart disease.     Every 10 years, have a colonoscopy. Or, have a yearly FIT test (stool test). These exams will check for colon cancer.    Talk to with your health care provider about screening for Abdominal Aortic Aneurysm if you have a family history of AAA or have a history of smoking.  Shots:     Get a flu shot each year.     Get a tetanus shot every 10 years.     Talk to your doctor about your pneumonia vaccines. There  are now two you should receive - Pneumovax (PPSV 23) and Prevnar (PCV 13).    Talk to your doctor about a shingles vaccine.     Talk to your doctor about the hepatitis B vaccine.  Nutrition:     Eat at least 5 servings of fruits and vegetables each day.     Eat whole-grain bread, whole-wheat pasta and brown rice instead of white grains and rice.     Talk to your doctor about Calcium and Vitamin D.   Lifestyle    Exercise for at least 150 minutes a week (30 minutes a day, 5 days a week). This will help you control your weight and prevent disease.     Limit alcohol to one drink per day.     No smoking.     Wear sunscreen to prevent skin cancer.     See your dentist every six months for an exam and cleaning.     See your eye doctor every 1 to 2 years to screen for conditions such as glaucoma, macular degeneration and cataracts.

## 2018-01-16 ENCOUNTER — OFFICE VISIT (OUTPATIENT)
Dept: FAMILY MEDICINE | Facility: CLINIC | Age: 73
End: 2018-01-16
Payer: MEDICARE

## 2018-01-16 VITALS
HEART RATE: 76 BPM | HEIGHT: 68 IN | BODY MASS INDEX: 26.98 KG/M2 | DIASTOLIC BLOOD PRESSURE: 72 MMHG | OXYGEN SATURATION: 100 % | SYSTOLIC BLOOD PRESSURE: 110 MMHG | WEIGHT: 178 LBS | TEMPERATURE: 97.8 F

## 2018-01-16 DIAGNOSIS — Z00.00 MEDICARE ANNUAL WELLNESS VISIT, SUBSEQUENT: Primary | ICD-10-CM

## 2018-01-16 DIAGNOSIS — I10 ESSENTIAL HYPERTENSION WITH GOAL BLOOD PRESSURE LESS THAN 140/90: ICD-10-CM

## 2018-01-16 DIAGNOSIS — E78.5 HYPERLIPIDEMIA LDL GOAL <130: ICD-10-CM

## 2018-01-16 DIAGNOSIS — I10 HYPERTENSION GOAL BP (BLOOD PRESSURE) < 140/90: ICD-10-CM

## 2018-01-16 DIAGNOSIS — Z12.11 SCREENING FOR COLON CANCER: ICD-10-CM

## 2018-01-16 DIAGNOSIS — J30.2 SEASONAL ALLERGIC RHINITIS, UNSPECIFIED CHRONICITY, UNSPECIFIED TRIGGER: ICD-10-CM

## 2018-01-16 DIAGNOSIS — C61 PROSTATE CANCER (H): ICD-10-CM

## 2018-01-16 DIAGNOSIS — Z91.81 AT RISK FOR FALLING: ICD-10-CM

## 2018-01-16 LAB
ALBUMIN SERPL-MCNC: 4.1 G/DL (ref 3.4–5)
ALP SERPL-CCNC: 54 U/L (ref 40–150)
ALT SERPL W P-5'-P-CCNC: 24 U/L (ref 0–70)
ANION GAP SERPL CALCULATED.3IONS-SCNC: 9 MMOL/L (ref 3–14)
AST SERPL W P-5'-P-CCNC: 22 U/L (ref 0–45)
BILIRUB SERPL-MCNC: 0.8 MG/DL (ref 0.2–1.3)
BUN SERPL-MCNC: 17 MG/DL (ref 7–30)
CALCIUM SERPL-MCNC: 9.1 MG/DL (ref 8.5–10.1)
CHLORIDE SERPL-SCNC: 96 MMOL/L (ref 94–109)
CHOLEST SERPL-MCNC: 166 MG/DL
CO2 SERPL-SCNC: 28 MMOL/L (ref 20–32)
CREAT SERPL-MCNC: 0.99 MG/DL (ref 0.66–1.25)
CREAT UR-MCNC: 143 MG/DL
ERYTHROCYTE [DISTWIDTH] IN BLOOD BY AUTOMATED COUNT: 12.1 % (ref 10–15)
GFR SERPL CREATININE-BSD FRML MDRD: 75 ML/MIN/1.7M2
GLUCOSE SERPL-MCNC: 111 MG/DL (ref 70–99)
HCT VFR BLD AUTO: 48.1 % (ref 40–53)
HDLC SERPL-MCNC: 65 MG/DL
HGB BLD-MCNC: 17.2 G/DL (ref 13.3–17.7)
LDLC SERPL CALC-MCNC: 87 MG/DL
MCH RBC QN AUTO: 31.9 PG (ref 26.5–33)
MCHC RBC AUTO-ENTMCNC: 35.8 G/DL (ref 31.5–36.5)
MCV RBC AUTO: 89 FL (ref 78–100)
MICROALBUMIN UR-MCNC: 13 MG/L
MICROALBUMIN/CREAT UR: 9.16 MG/G CR (ref 0–17)
NONHDLC SERPL-MCNC: 101 MG/DL
PLATELET # BLD AUTO: 216 10E9/L (ref 150–450)
POTASSIUM SERPL-SCNC: 3.3 MMOL/L (ref 3.4–5.3)
PROT SERPL-MCNC: 7.4 G/DL (ref 6.8–8.8)
PSA SERPL-MCNC: 0.02 UG/L (ref 0–4)
RBC # BLD AUTO: 5.4 10E12/L (ref 4.4–5.9)
SODIUM SERPL-SCNC: 133 MMOL/L (ref 133–144)
TRIGL SERPL-MCNC: 68 MG/DL
WBC # BLD AUTO: 6.4 10E9/L (ref 4–11)

## 2018-01-16 PROCEDURE — 36415 COLL VENOUS BLD VENIPUNCTURE: CPT | Performed by: FAMILY MEDICINE

## 2018-01-16 PROCEDURE — 84153 ASSAY OF PSA TOTAL: CPT | Performed by: FAMILY MEDICINE

## 2018-01-16 PROCEDURE — 80061 LIPID PANEL: CPT | Performed by: FAMILY MEDICINE

## 2018-01-16 PROCEDURE — 85027 COMPLETE CBC AUTOMATED: CPT | Performed by: FAMILY MEDICINE

## 2018-01-16 PROCEDURE — G0439 PPPS, SUBSEQ VISIT: HCPCS | Performed by: FAMILY MEDICINE

## 2018-01-16 PROCEDURE — 80053 COMPREHEN METABOLIC PANEL: CPT | Performed by: FAMILY MEDICINE

## 2018-01-16 PROCEDURE — 82043 UR ALBUMIN QUANTITATIVE: CPT | Performed by: FAMILY MEDICINE

## 2018-01-16 RX ORDER — ATORVASTATIN CALCIUM 10 MG/1
5 TABLET, FILM COATED ORAL DAILY
Qty: 90 TABLET | Refills: 3 | Status: SHIPPED | OUTPATIENT
Start: 2018-01-16 | End: 2019-01-17

## 2018-01-16 RX ORDER — LOSARTAN POTASSIUM 50 MG/1
50 TABLET ORAL DAILY
Qty: 90 TABLET | Refills: 3 | Status: SHIPPED | OUTPATIENT
Start: 2018-01-16 | End: 2019-01-17

## 2018-01-16 RX ORDER — FLUTICASONE PROPIONATE 50 MCG
1-2 SPRAY, SUSPENSION (ML) NASAL DAILY
Qty: 48 G | Refills: 3 | Status: SHIPPED | OUTPATIENT
Start: 2018-01-16 | End: 2019-01-17

## 2018-01-16 RX ORDER — POTASSIUM CHLORIDE 1500 MG/1
20 TABLET, EXTENDED RELEASE ORAL DAILY
Qty: 90 TABLET | Refills: 3 | Status: SHIPPED | OUTPATIENT
Start: 2018-01-16 | End: 2018-01-16

## 2018-01-16 RX ORDER — POTASSIUM CHLORIDE 1500 MG/1
40 TABLET, EXTENDED RELEASE ORAL DAILY
Qty: 180 TABLET | Refills: 3 | Status: SHIPPED | OUTPATIENT
Start: 2018-01-16 | End: 2019-01-17

## 2018-01-16 RX ORDER — CHLORTHALIDONE 25 MG/1
25 TABLET ORAL DAILY
Qty: 90 TABLET | Refills: 3 | Status: SHIPPED | OUTPATIENT
Start: 2018-01-16 | End: 2019-01-17

## 2018-01-16 NOTE — PROGRESS NOTES
Dear Chivo,    Here is a summary of your recent test results:  -Liver and gallbladder tests (ALT,AST, Alk phos,bilirubin) are normal.  -Kidney function (GFR) is normal.  -Sodium is normal.  -Potassium is decreased.  ADVISE: increasing your potassium to 40 meq daily (a prescription was sent in) and lab recheck in 1 month (BMP, DX: hypokalemia).  -Glucose is slight elevated and may be sign of early diabetes (prediabetes). ADVISE:: low carbohydrate diet, exercise, try to lose weight (if necessary) and recheck glucose in 12 months. (GLU,A1C, DX: prediabetes)  -Cholesterol levels are at your goal levels.  ADVISE: Continuing your medication, a regular exercise program with at least 30 minutes of aerobic exercise 3-4 days/week ( 45 minutes 4-6 days/week if weight loss needed), and a low saturated fat,/low carbohydrate diet are helpful to maintain this.  Rechecking your fasting cholesterol panel in 12 months is recommended (Lipid w/ LDL reflex).  -PSA (prostate specific antigen) test is normal.  This indicates a low likelihood of prostate cancer.  ADVISE: yearly recheck.   -Normal red blood cell (hgb) levels, normal white blood cell count and normal platelet levels.  -Microalbumin (urine protein) test is normal.  ADVISE: recheck annually    For additional lab test information, labtestsonline.org is an excellent reference.           Thank you very much for trusting me and [facility].     Healthy regards,  Joaquin Walker MD

## 2018-01-16 NOTE — MR AVS SNAPSHOT
After Visit Summary   1/16/2018    Steven Duong    MRN: [id number]           Patient Information     Date Of Birth          1945        Visit Information        Provider Department      1/16/2018 8:00 AM Nash Walker MD [facility] [place]        Today's Diagnoses     Medicare annual wellness visit, subsequent    -  1    h/o prostate cancer (H) - S/p prostatectomy at 56 yo (2001)        Hypertension goal BP (blood pressure) < 140/90        Essential hypertension with goal blood pressure less than 140/90        Hyperlipidemia LDL goal <130        Seasonal allergic rhinitis, unspecified chronicity, unspecified trigger        Screening for colon cancer        At risk for falling          Care Instructions          Services Typically covered by Medicare Recommended Completed   Vaccines    Pneumonoccol    Influenza    Hepatitis B (if medium/high risk)     Once for patients after age 65    Yearly  Medium/high risk factors:    End Stage Kidney Disease    Hemophiliacs who received Factor XIII or IX concentrates    Clients of institutions for developmentally disabled    Persons who live in same house as a Hepatitis B carrier    Homosexual men    Illicit injectable drug users    Health care workers     Mammogram Covered: One-time screen between age 35-39, annually for age 40+     Pap and Pelvic Exam Covered: Annually if  high risk,  or childbearing age with abnormal Pap in last 3 years.  Q24 months for all other women     Prostate Cancer Screening    Digital rectal exam    PSA Covered: Annually for all men > age 50     Corolrectal Cancer Screening Screening colonoscopy every 10 years, more often for high risk patients     Diabetes Self-Management Training Requires referral by treating physician for patient with diabetes     Diabetes Screening    Fasting blood sugar or glucose tolerance test   Once yearly, twice yearly if prediabetic     Cardiovascular Screening Blood Tests    Total  Cholesterol    HDL    Triglycerides Every 5 years     Medical Nutrition Therapy for Diabetes or Renal Disease Requires referral by treating physician for patient with diabetes or kidney disease     Glaucoma Screening Annually for patients with one of the following risk factors:    Diabetes Mellitus    Family history of Glaucoma    -American age 50 and over    -American age 65 and over     Bone Mass Measurement Every 24 months if one of the following risk factors:    Estrogen deficiency    Vertebral abnormalities on x-ray indicative of Osteoporosis, Osteopenia, or Vertebral fracture    Receiving/expected to receive the equivalent of at least 5 mg of Prednisone per day for > 3 months    Hyperparathyroidism    Patient being monitored for response to Osteoporosis Therapy     One-time AAA screen  Must be ordered as part of Medicare IPPE   Any patient with a family history of AAA    Males Age 65-75, with history of smoking at least 100 cigarettes in lifetime     Smoking Cessation Counseling Beneficiaries who use tobacco are eligible to receive 2 cessation attempts per year; each attempt includes maximum of 4 sessions     HIV Screening Annually for beneficiaries at increased risk:       Increased risk for HIV infection is defined in the  National Coverage Determinations (NCD) Manual,  Publication 100-03 Sections 190.14 (diagnostic) and 210.7 (screening). See http://www.cms.gov/manuals/downloads/xrl112r6_Gudr6.pdf and http://www.cms.gov/manuals/downloads/zxo863p9_Ordi7.pdf on the Internet.  Three times per pregnancy for beneficiaries who are pregnant.     Future Annual Wellness Visit Annually, for all beneficiaries.       Preventive Health Recommendations:       Male Ages 65 and over    Yearly exam:             See your health care provider every year in order to  o   Review health changes.   o   Discuss preventive care.    o   Review your medicines if your doctor has prescribed any.    Talk with your health  care provider about whether you should have a test to screen for prostate cancer (PSA).    Every 3 years, have a diabetes test (fasting glucose). If you are at risk for diabetes, you should have this test more often.    Every 5 years, have a cholesterol test. Have this test more often if you are at risk for high cholesterol or heart disease.     Every 10 years, have a colonoscopy. Or, have a yearly FIT test (stool test). These exams will check for colon cancer.    Talk to with your health care provider about screening for Abdominal Aortic Aneurysm if you have a family history of AAA or have a history of smoking.  Shots:     Get a flu shot each year.     Get a tetanus shot every 10 years.     Talk to your doctor about your pneumonia vaccines. There are now two you should receive - Pneumovax (PPSV 23) and Prevnar (PCV 13).    Talk to your doctor about a shingles vaccine.     Talk to your doctor about the hepatitis B vaccine.  Nutrition:     Eat at least 5 servings of fruits and vegetables each day.     Eat whole-grain bread, whole-wheat pasta and brown rice instead of white grains and rice.     Talk to your doctor about Calcium and Vitamin D.   Lifestyle    Exercise for at least 150 minutes a week (30 minutes a day, 5 days a week). This will help you control your weight and prevent disease.     Limit alcohol to one drink per day.     No smoking.     Wear sunscreen to prevent skin cancer.     See your dentist every six months for an exam and cleaning.     See your eye doctor every 1 to 2 years to screen for conditions such as glaucoma, macular degeneration and cataracts.          Follow-ups after your visit        Additional Services     GASTROENTEROLOGY ADULT REF PROCEDURE ONLY       Last Lab Result: Creatinine (mg/dL)       Date                     Value                 11/18/2016               1.01             ----------  Body mass index is 27.06 kg/(m^2).      Patient will be contacted to schedule procedure.      Please be aware that coverage of these services is subject to the terms and limitations of your health insurance plan.  Call member services at your health plan with any benefit or coverage questions.  Any procedures must be performed at a [place] facility OR coordinated by your clinic's referral office.    Please bring the following with you to your appointment:    (1) Any X-Rays, CTs or MRIs which have been performed.  Contact the facility where they were done to arrange for  prior to your scheduled appointment.    (2) List of current medications   (3) This referral request   (4) Any documents/labs given to you for this referral                  Follow-up notes from your care team     Return in about 1 year (around 1/16/2019) for Wellness visit and fasting labs.      Who to contact     If you have questions or need follow up information about today's clinic visit or your schedule please contact [facility] directly at [phone].  Normal or non-critical lab and imaging results will be communicated to you by MyChart, letter or phone within 4 business days after the clinic has received the results. If you do not hear from us within 7 days, please contact the clinic through [employer] or phone. If you have a critical or abnormal lab result, we will notify you by phone as soon as possible.  Submit refill requests through [employer] or call your pharmacy and they will forward the refill request to us. Please allow 3 business days for your refill to be completed.          Additional Information About Your Visit        MyChart Information     Heat[employer] gives you secure access to your electronic health record. If you see a primary care provider, you can also send messages to your care team and make appointments. If you have questions, please call your primary care clinic.  If you do not have a primary care provider, please call [phone] and they will assist you.        Care EveryWhere ID     This  "is your Care EveryWhere ID. This could be used by other organizations to access your [place] medical records  [id number]        Your Vitals Were     Pulse Temperature Height Pulse Oximetry BMI (Body Mass Index)       76 97.8  F (36.6  C) (Oral) 5' 8\" (1.727 m) 100% 27.06 kg/m2        Blood Pressure from Last 3 Encounters:   01/16/18 110/72   03/27/17 110/78   03/23/17 122/66    Weight from Last 3 Encounters:   01/16/18 178 lb (80.7 kg)   03/27/17 180 lb (81.6 kg)   03/23/17 182 lb (82.6 kg)              We Performed the Following          ADMIN VACCINE, FIRST [97546]     Albumin Random Urine Quantitative with Creat Ratio     CBC with platelets     Comprehensive metabolic panel     FLU VACCINE, INCREASED ANTIGEN, PRESV FREE, AGE 65+ [18336]     GASTROENTEROLOGY ADULT REF PROCEDURE ONLY     Lipid panel reflex to direct LDL Fasting     PSA, tumor marker          Today's Medication Changes          These changes are accurate as of: 1/16/18  8:44 AM.  If you have any questions, ask your nurse or doctor.               These medicines have changed or have updated prescriptions.        Dose/Directions    cholecalciferol 1000 UNIT tablet   Commonly known as:  vitamin D3   This may have changed:    - medication strength  - how much to take  - when to take this   Used for:  Medicare annual wellness visit, subsequent   Changed by:  Nash Walker MD        Dose:  1000 Units   Take 1 tablet (1,000 Units) by mouth daily   Quantity:  30 tablet   Refills:  0       potassium chloride SA 20 MEQ CR tablet   Commonly known as:  KLOR-CON   This may have changed:  See the new instructions.   Used for:  Hypertension goal BP (blood pressure) < 140/90   Changed by:  Nash Walker MD        Dose:  20 mEq   Take 1 tablet (20 mEq) by mouth daily   Quantity:  90 tablet   Refills:  3            Where to get your medicines      These medications were sent to [facility] - [facility], [place] - [address] [place], [place] [address] " [facility] [address]     Phone:  [phone]     atorvastatin 10 MG tablet    chlorthalidone 25 MG tablet    fluticasone 50 MCG/ACT spray    losartan 50 MG tablet    potassium chloride SA 20 MEQ CR tablet         Some of these will need a paper prescription and others can be bought over the counter.  Ask your nurse if you have questions.     You don't need a prescription for these medications     cholecalciferol 1000 UNIT tablet                Primary Care Provider Office Phone # Fax #    Nash Walker [phone]       [address] [place] [address]        Equal Access to Services     [facility]: Hadii hanna esqueda hadashkatarina [place], waaxda luqadaha, qaybta kaalmada joann, robles vu . So [facility] [phone].    ATENCIÓN: Si habla español, tiene a odell disposición servicios gratuitos de asistencia lingüística. [facility] [phone].    We comply with applicable federal civil rights laws and [place] laws. We do not discriminate on the basis of race, color, national origin, age, disability, sex, sexual orientation, or gender identity.            Thank you!     Thank you for choosing [facility]  for your care. Our goal is always to provide you with excellent care. Hearing back from our patients is one way we can continue to improve our services. Please take a few minutes to complete the written survey that you may receive in the mail after your visit with us. Thank you!             Your Updated Medication List - Protect others around you: Learn how to safely use, store and throw away your medicines at www.disposemymeds.org.          This list is accurate as of: 1/16/18  8:44 AM.  Always use your most recent med list.                   Brand Name Dispense Instructions for use Diagnosis    aspirin 81 MG tablet     100    ONE DAILY        atorvastatin 10 MG tablet    LIPITOR    90 tablet    Take 0.5 tablets (5 mg) by mouth daily     Hyperlipidemia LDL goal <130       chlorthalidone 25 MG tablet    HYGROTON    90 tablet    Take 1 tablet (25 mg) by mouth daily    Hypertension goal BP (blood pressure) < 140/90       cholecalciferol 1000 UNIT tablet    vitamin D3    30 tablet    Take 1 tablet (1,000 Units) by mouth daily    Medicare annual wellness visit, subsequent       fluticasone 50 MCG/ACT spray    FLONASE    48 g    Spray 1-2 sprays into both nostrils daily    Seasonal allergic rhinitis, unspecified chronicity, unspecified trigger       losartan 50 MG tablet    COZAAR    90 tablet    Take 1 tablet (50 mg) by mouth daily    Hypertension goal BP (blood pressure) < 140/90       LUTEIN PO           multivitamin per tablet     100    ONE DAILY        potassium chloride SA 20 MEQ CR tablet    KLOR-CON    90 tablet    Take 1 tablet (20 mEq) by mouth daily    Hypertension goal BP (blood pressure) < 140/90

## 2018-01-25 ENCOUNTER — TELEPHONE (OUTPATIENT)
Dept: FAMILY MEDICINE | Facility: CLINIC | Age: 73
End: 2018-01-25

## 2018-01-25 DIAGNOSIS — E87.6 HYPOKALEMIA: Primary | ICD-10-CM

## 2018-01-25 NOTE — TELEPHONE ENCOUNTER
Patient calling regarding   Medication Detail         Disp Refills Start End ELOINA     potassium chloride SA (KLOR-CON) 20 MEQ CR tablet 180 tablet 3 1/16/2018  No     Sig: Take 2 tablets (40 mEq) by mouth daily     Patient states he was taking 1 tab daily - would like to know if 2 tabs daily were sent in error or why he is taking 2 tabs now    Per 01/16/2018 lab result:   -Liver and gallbladder tests (ALT,AST, Alk phos,bilirubin) are normal.   -Kidney function (GFR) is normal.   -Sodium is normal.   -Potassium is decreased.  ADVISE: increasing your potassium to 40 meq daily (a prescription was sent in) and lab recheck in 1 month (BMP, DX: hypokalemia).   -Glucose is slight elevated and may be sign of early diabetes (prediabetes). ADVISE:: low carbohydrate diet, exercise, try to lose weight (if necessary) and recheck glucose in 12 months. (GLU,A1C, DX: prediabetes)   -Cholesterol levels are at your goal levels.  ADVISE: Continuing your medication, a regular exercise program with at least 30 minutes of aerobic exercise 3-4 days/week ( 45 minutes 4-6 days/week if weight loss needed), and a low saturated fat,/low carbohydrate diet are helpful to maintain this.  Rechecking your fasting cholesterol panel in 12 months is recommended (Lipid w/ LDL reflex).   -PSA (prostate specific antigen) test is normal.  This indicates a low likelihood of prostate cancer.  ADVISE: yearly recheck.   -Normal red blood cell (hgb) levels, normal white blood cell count and normal platelet levels.   -Microalbumin (urine protein) test is normal.  ADVISE: recheck annually     Advised patient of results - Patient stated an understanding and agreed with plan.  Lab ([facility]) futured  Lab Only appointment scheduled for 02/12/2018    Viv Coleman RN  [facility]

## 2018-02-12 DIAGNOSIS — E87.6 HYPOKALEMIA: ICD-10-CM

## 2018-02-12 LAB
ANION GAP SERPL CALCULATED.3IONS-SCNC: 12 MMOL/L (ref 3–14)
BUN SERPL-MCNC: 15 MG/DL (ref 7–30)
CALCIUM SERPL-MCNC: 9.4 MG/DL (ref 8.5–10.1)
CHLORIDE SERPL-SCNC: 97 MMOL/L (ref 94–109)
CO2 SERPL-SCNC: 24 MMOL/L (ref 20–32)
CREAT SERPL-MCNC: 1.07 MG/DL (ref 0.66–1.25)
GFR SERPL CREATININE-BSD FRML MDRD: 68 ML/MIN/1.7M2
GLUCOSE SERPL-MCNC: 117 MG/DL (ref 70–99)
POTASSIUM SERPL-SCNC: 4.2 MMOL/L (ref 3.4–5.3)
SODIUM SERPL-SCNC: 133 MMOL/L (ref 133–144)

## 2018-02-12 PROCEDURE — 36415 COLL VENOUS BLD VENIPUNCTURE: CPT | Performed by: FAMILY MEDICINE

## 2018-02-12 PROCEDURE — 80048 BASIC METABOLIC PNL TOTAL CA: CPT | Performed by: FAMILY MEDICINE

## 2018-02-12 NOTE — PROGRESS NOTES
Dear Chivo,    Here is a summary of your recent test results:  -Kidney function (GFR) is normal.  -Sodium is normal.  -Potassium is normal.  -Glucose is slight elevated and may be sign of early diabetes (prediabetes). ADVISE:: low carbohydrate diet, exercise, try to lose weight (if necessary) and recheck glucose in 12 months. (GLU,A1C, DX: prediabetes)           Thank you very much for trusting me and [facility].     Healthy regards,  Joaquin Walker MD

## 2018-03-05 NOTE — PROGRESS NOTES
"  SUBJECTIVE:                                                    Steven Duong is a 72 year old male who presents to clinic today for the following health issues:    Acute Illness   Acute illness concerns: Sinus  Onset: 9 days    Fever: YES- in the begining    Chills/Sweats: YES- in the begining    Headache (location?): YES    Sinus Pressure:YES    Conjunctivitis:  no    Ear Pain: plugged    Rhinorrhea: YES    Congestion: YES    Sore Throat: YES    Tooth Pain: YES     Cough: no    Wheeze: no    Decreased Appetite: no    Nausea: no    Vomiting: no    Diarrhea:  no    Dysuria/Freq.: no    Fatigue/Achiness: YES    Sick/Strep Exposure: no     Therapies Tried and outcome: mucinex/ tylenol      Problem list and histories reviewed & adjusted, as indicated.  Additional history: as documented      ROS:   Constitutional, HEENT, cardiovascular, pulmonary, GI, , musculoskeletal, neuro, skin, endocrine and psych systems are negative, except as otherwise noted.    This document serves as a record of the services and decisions personally performed and made by Nash Walker MD. It was created on his behalf by Ching Pittman, a trained medical scribe. The creation of this document is based on the provider's statements to the medical scribe.  Ching Pittman 10:06 AM 3/6/2018  OBJECTIVE:                     /72  Pulse 88  Temp 97.8  F (36.6  C) (Oral)  Ht 1.727 m (5' 8\")  Wt 81.6 kg (180 lb)  SpO2 98%  BMI 27.37 kg/m2  GENERAL: no apparent distress  EYES: Conjunctiva are not injected, no discharge.  EARS: Left TM -no erythema, no effusion,  not bulged.               Right TM -no erythema, no effusion,  not bulged.  NOSE: deviated septum to the right, otherwise no discharge, no sinus tenderness  THROAT: no erythema, no exudate, no lesions  NECK: supple, no adenopathy.  CARDIAC: regular rate and rhythm, no murmur  RESP: clear, no wheezing, no rales, no rhonchi  ABD: soft, no distension, no tenderness  SKIN: No " alexa    Diagnostic test results:  none   ASSESSMENT/PLAN:                       Steven was seen today for sinus problem.    Diagnoses and all orders for this visit:    Acute sinusitis with symptoms > 10 days - Start taking azithromycin regimen. Take the medication with food and water. Rest, fluids, humidity. Recommended that the patient begin saline irrigation.  -     azithromycin (ZITHROMAX) 250 MG tablet; Two tablets first day, then one tablet daily for four days        Symptomatic cares and fever control(if indicated) discussed.  Risks and benefits of meds discussed.    See patient instructions.    Health Maintenance Topics with due status: Due Soon       Topic Date Due    PHQ-9 Q1YR 03/27/2018     The information in this document, created by a scribe for me, accurately reflects the services I personally performed and the decisions made by me. I have reviewed and approved this document for accuracy.      Joaquin Walker MD

## 2018-03-06 ENCOUNTER — OFFICE VISIT (OUTPATIENT)
Dept: FAMILY MEDICINE | Facility: CLINIC | Age: 73
End: 2018-03-06
Payer: MEDICARE

## 2018-03-06 VITALS
OXYGEN SATURATION: 98 % | HEIGHT: 68 IN | WEIGHT: 180 LBS | BODY MASS INDEX: 27.28 KG/M2 | HEART RATE: 88 BPM | SYSTOLIC BLOOD PRESSURE: 110 MMHG | DIASTOLIC BLOOD PRESSURE: 72 MMHG | TEMPERATURE: 97.8 F

## 2018-03-06 DIAGNOSIS — J01.90 ACUTE SINUSITIS WITH SYMPTOMS > 10 DAYS: Primary | ICD-10-CM

## 2018-03-06 PROCEDURE — 99213 OFFICE O/P EST LOW 20 MIN: CPT | Performed by: FAMILY MEDICINE

## 2018-03-06 RX ORDER — AZITHROMYCIN 250 MG/1
TABLET, FILM COATED ORAL
Qty: 6 TABLET | Refills: 0 | Status: SHIPPED | OUTPATIENT
Start: 2018-03-06 | End: 2018-05-29

## 2018-03-06 ASSESSMENT — ANXIETY QUESTIONNAIRES
3. WORRYING TOO MUCH ABOUT DIFFERENT THINGS: NOT AT ALL
1. FEELING NERVOUS, ANXIOUS, OR ON EDGE: NOT AT ALL
7. FEELING AFRAID AS IF SOMETHING AWFUL MIGHT HAPPEN: NOT AT ALL
2. NOT BEING ABLE TO STOP OR CONTROL WORRYING: NOT AT ALL
GAD7 TOTAL SCORE: 0
6. BECOMING EASILY ANNOYED OR IRRITABLE: NOT AT ALL
5. BEING SO RESTLESS THAT IT IS HARD TO SIT STILL: NOT AT ALL

## 2018-03-06 ASSESSMENT — PATIENT HEALTH QUESTIONNAIRE - PHQ9: 5. POOR APPETITE OR OVEREATING: NOT AT ALL

## 2018-03-06 NOTE — NURSING NOTE
"Chief Complaint   Patient presents with     Sinus Problem       Initial /72  Pulse 88  Temp 97.8  F (36.6  C) (Oral)  Ht 5' 8\" (1.727 m)  Wt 180 lb (81.6 kg)  SpO2 98%  BMI 27.37 kg/m2 Estimated body mass index is 27.37 kg/(m^2) as calculated from the following:    Height as of this encounter: 5' 8\" (1.727 m).    Weight as of this encounter: 180 lb (81.6 kg).  Medication Reconciliation: complete  "

## 2018-03-06 NOTE — MR AVS SNAPSHOT
"              After Visit Summary   3/6/2018    Steven Duong    MRN: [id number]           Patient Information     Date Of Birth          1945        Visit Information        Provider Department      3/6/2018 9:40 AM Nash Walker MD [facility]        Today's Diagnoses     Acute sinusitis with symptoms > 10 days    -  1       Follow-ups after your visit        Who to contact     If you have questions or need follow up information about today's clinic visit or your schedule please contact [facility] directly at [phone].  Normal or non-critical lab and imaging results will be communicated to you by "[employer]hart, letter or phone within 4 business days after the clinic has received the results. If you do not hear from us within 7 days, please contact the clinic through [employer] or phone. If you have a critical or abnormal lab result, we will notify you by phone as soon as possible.  Submit refill requests through NAVX or call your pharmacy and they will forward the refill request to us. Please allow 3 business days for your refill to be completed.          Additional Information About Your Visit        MyChart Information     [employer] gives you secure access to your electronic health record. If you see a primary care provider, you can also send messages to your care team and make appointments. If you have questions, please call your primary care clinic.  If you do not have a primary care provider, please call [phone] and they will assist you.        Care EveryWhere ID     This is your Care EveryWhere ID. This could be used by other organizations to access your [facility] medical records  [id number]        Your Vitals Were     Pulse Temperature Height Pulse Oximetry BMI (Body Mass Index)       88 97.8  F (36.6  C) (Oral) 5' 8\" (1.727 m) 98% 27.37 kg/m2        Blood Pressure from Last 3 Encounters:   03/06/18 110/72   01/16/18 110/72   03/27/17 110/78    Weight from Last " 3 Encounters:   03/06/18 180 lb (81.6 kg)   01/16/18 178 lb (80.7 kg)   03/27/17 180 lb (81.6 kg)              Today, you had the following     No orders found for display         Today's Medication Changes          These changes are accurate as of 3/6/18 10:13 AM.  If you have any questions, ask your nurse or doctor.               Start taking these medicines.        Dose/Directions    azithromycin 250 MG tablet   Commonly known as:  ZITHROMAX   Used for:  Acute sinusitis with symptoms > 10 days   Started by:  Nash Walker MD        Two tablets first day, then one tablet daily for four days   Quantity:  6 tablet   Refills:  0            Where to get your medicines      These medications were sent to [facility] [place] - [address] [address]     Phone:  [phone]     azithromycin 250 MG tablet                Primary Care Provider Office Phone # Fax #    Nash Walker [phone]       [address] [place] [address]        Equal Access to Services     [facility]: Hadii hanna ku hadasho [place], waaxda luqadaha, qaybta kaalmada adeegyada, waxantonia vu . So [facility] [phone].    ATENCIÓN: Si habla español, tiene a odlel disposición servicios gratuitos de asistencia lingüística. Llame al [phone].    We comply with applicable federal civil rights laws and [place] laws. We do not discriminate on the basis of race, color, national origin, age, disability, sex, sexual orientation, or gender identity.            Thank you!     Thank you for choosing [facility]  for your care. Our goal is always to provide you with excellent care. Hearing back from our patients is one way we can continue to improve our services. Please take a few minutes to complete the written survey that you may receive in the mail after your visit with us. Thank you!             Your  Updated Medication List - Protect others around you: Learn how to safely use, store and throw away your medicines at www.disposemymeds.org.          This list is accurate as of 3/6/18 10:13 AM.  Always use your most recent med list.                   Brand Name Dispense Instructions for use Diagnosis    aspirin 81 MG tablet     100    ONE DAILY        atorvastatin 10 MG tablet    LIPITOR    90 tablet    Take 0.5 tablets (5 mg) by mouth daily    Hyperlipidemia LDL goal <130       azithromycin 250 MG tablet    ZITHROMAX    6 tablet    Two tablets first day, then one tablet daily for four days    Acute sinusitis with symptoms > 10 days       chlorthalidone 25 MG tablet    HYGROTON    90 tablet    Take 1 tablet (25 mg) by mouth daily    Hypertension goal BP (blood pressure) < 140/90       cholecalciferol 1000 UNIT tablet    vitamin D3    30 tablet    Take 1 tablet (1,000 Units) by mouth daily    Medicare annual wellness visit, subsequent       fluticasone 50 MCG/ACT spray    FLONASE    48 g    Spray 1-2 sprays into both nostrils daily    Seasonal allergic rhinitis, unspecified chronicity, unspecified trigger       losartan 50 MG tablet    COZAAR    90 tablet    Take 1 tablet (50 mg) by mouth daily    Hypertension goal BP (blood pressure) < 140/90       LUTEIN PO           potassium chloride SA 20 MEQ CR tablet    KLOR-CON    180 tablet    Take 2 tablets (40 mEq) by mouth daily    Hypertension goal BP (blood pressure) < 140/90       VITAMIN C PO      Take 1,000 mg by mouth daily

## 2018-03-07 ASSESSMENT — ANXIETY QUESTIONNAIRES: GAD7 TOTAL SCORE: 0

## 2018-03-07 ASSESSMENT — PATIENT HEALTH QUESTIONNAIRE - PHQ9: SUM OF ALL RESPONSES TO PHQ QUESTIONS 1-9: 0

## 2018-05-16 ENCOUNTER — TRANSFERRED RECORDS (OUTPATIENT)
Dept: HEALTH INFORMATION MANAGEMENT | Facility: CLINIC | Age: 73
End: 2018-05-16

## 2018-05-16 LAB
ALT SERPL-CCNC: 21 IU/L (ref 8–45)
AST SERPL-CCNC: 27 IU/L (ref 2–40)

## 2018-05-17 LAB
CREAT SERPL-MCNC: 1 MG/DL (ref 0.72–1.25)
GFR SERPL CREATININE-BSD FRML MDRD: >60 ML/MIN/1.73M2
GLUCOSE SERPL-MCNC: 112 MG/DL (ref 65–100)
POTASSIUM SERPL-SCNC: 3.5 MMOL/L (ref 3.5–5)

## 2018-05-19 ENCOUNTER — MYC MEDICAL ADVICE (OUTPATIENT)
Dept: FAMILY MEDICINE | Facility: CLINIC | Age: 73
End: 2018-05-19

## 2018-05-21 ENCOUNTER — TELEPHONE (OUTPATIENT)
Dept: FAMILY MEDICINE | Facility: CLINIC | Age: 73
End: 2018-05-21

## 2018-05-21 DIAGNOSIS — I10 HYPERTENSION GOAL BP (BLOOD PRESSURE) < 140/90: Primary | ICD-10-CM

## 2018-05-21 DIAGNOSIS — E78.5 HYPERLIPIDEMIA LDL GOAL <130: ICD-10-CM

## 2018-05-21 DIAGNOSIS — R73.03 PREDIABETES: ICD-10-CM

## 2018-05-21 DIAGNOSIS — C61 PROSTATE CANCER (H): ICD-10-CM

## 2018-05-21 NOTE — TELEPHONE ENCOUNTER
Reason for Call: Request for an order or referral:    Order or referral being requested: The patient is calling saying he is seeing Dr. Walker tomorrow at 1:40. He says he would like to get his labs done tomorrow morning so he can talk with Dr. Walker about them. He wants his sodium and potassium done. He has this scheduled for tomorrow morning at 9:00 a.m.    Date needed: as soon as possible    Has the patient been seen by the PCP for this problem? YES    Phone number Patient can be reached at:  Cell number on file:    Telephone Information:   Mobile [phone]     Best Time:  Anytime    Can we leave a detailed message on this number?  YES    Call taken on 5/21/2018 at 2:13 PM by Yanet Turner

## 2018-05-21 NOTE — TELEPHONE ENCOUNTER
No notes on file stating patient needs this rechecked - last labs for these were normal:     Last Basic Metabolic Panel (02/12/2018):  Lab Results   Component Value Date     02/12/2018      Lab Results   Component Value Date    POTASSIUM 4.2 02/12/2018     Lab Results   Component Value Date    CHLORIDE 97 02/12/2018     Lab Results   Component Value Date    JOSE A 9.4 02/12/2018     Lab Results   Component Value Date    CO2 24 02/12/2018     Lab Results   Component Value Date    BUN 15 02/12/2018     Lab Results   Component Value Date    CR 1.07 02/12/2018     Lab Results   Component Value Date     02/12/2018       Routing to PCP for further review/recommendations/orders.  BMP pended    Viv Coleman RN  [facility]

## 2018-05-22 DIAGNOSIS — C61 PROSTATE CANCER (H): ICD-10-CM

## 2018-05-22 DIAGNOSIS — E78.5 HYPERLIPIDEMIA LDL GOAL <130: ICD-10-CM

## 2018-05-22 DIAGNOSIS — I10 HYPERTENSION GOAL BP (BLOOD PRESSURE) < 140/90: ICD-10-CM

## 2018-05-22 LAB
ANION GAP SERPL CALCULATED.3IONS-SCNC: 8 MMOL/L (ref 3–14)
BUN SERPL-MCNC: 14 MG/DL (ref 7–30)
CALCIUM SERPL-MCNC: 9.2 MG/DL (ref 8.5–10.1)
CHLORIDE SERPL-SCNC: 97 MMOL/L (ref 94–109)
CHOLEST SERPL-MCNC: 148 MG/DL
CO2 SERPL-SCNC: 28 MMOL/L (ref 20–32)
CREAT SERPL-MCNC: 1 MG/DL (ref 0.66–1.25)
CREAT UR-MCNC: 155 MG/DL
GFR SERPL CREATININE-BSD FRML MDRD: 74 ML/MIN/1.7M2
GLUCOSE SERPL-MCNC: 119 MG/DL (ref 70–99)
HDLC SERPL-MCNC: 58 MG/DL
LDLC SERPL CALC-MCNC: 79 MG/DL
MICROALBUMIN UR-MCNC: 23 MG/L
MICROALBUMIN/CREAT UR: 15.03 MG/G CR (ref 0–17)
NONHDLC SERPL-MCNC: 90 MG/DL
POTASSIUM SERPL-SCNC: 3.9 MMOL/L (ref 3.4–5.3)
PSA SERPL-MCNC: 0.02 UG/L (ref 0–4)
SODIUM SERPL-SCNC: 133 MMOL/L (ref 133–144)
TRIGL SERPL-MCNC: 53 MG/DL

## 2018-05-22 PROCEDURE — 82043 UR ALBUMIN QUANTITATIVE: CPT | Performed by: FAMILY MEDICINE

## 2018-05-22 PROCEDURE — 80061 LIPID PANEL: CPT | Performed by: FAMILY MEDICINE

## 2018-05-22 PROCEDURE — 36415 COLL VENOUS BLD VENIPUNCTURE: CPT | Performed by: FAMILY MEDICINE

## 2018-05-22 PROCEDURE — 84153 ASSAY OF PSA TOTAL: CPT | Performed by: FAMILY MEDICINE

## 2018-05-22 PROCEDURE — 80048 BASIC METABOLIC PNL TOTAL CA: CPT | Performed by: FAMILY MEDICINE

## 2018-05-23 NOTE — PROGRESS NOTES
Dear Chivo,    Here is a summary of your recent test results:  -Kidney function (GFR) is normal.  -Sodium is normal.  -Potassium is normal.  -Glucose is slight elevated and may be sign of early diabetes (prediabetes). ADVISE:: low carbohydrate diet, exercise, try to lose weight (if necessary) and recheck glucose in 12 months. (GLU,A1C, DX: prediabetes)  -Cholesterol levels are at your goal levels.  ADVISE: Continuing your medication, a regular exercise program with at least 30 minutes of aerobic exercise 3-4 days/week ( 45 minutes 4-6 days/week if weight loss needed), and a low saturated fat,/low carbohydrate diet are helpful to maintain this.  Rechecking your fasting cholesterol panel in 12 months is recommended (Lipid w/ LDL reflex).  -PSA (prostate specific antigen) test is very low.  This indicates a low likelihood of prostate cancer.  ADVISE: yearly recheck.   -Microalbumin (urine protein) test is normal.  ADVISE: recheck annually    For additional lab test information, labtestsonline.org is an excellent reference.    In addition, here is a list of due or overdue Health Maintenance reminders:  Colonoscopy - every 10 years due on 05/09/2018    Please call us at [phone] (or use Get [employer]) to address the above recommendations if needed.           Thank you very much for trusting me and [facility] [place].     Healthy regards,  Joaquin Walker MD

## 2018-05-23 NOTE — PROGRESS NOTES
SUBJECTIVE:                                                    Steven Duong is a 73 year old male who presents to clinic today for the following health issues:    Finger Injury - The patient injured his right fourth finger 4 days while doing yard work. He pinched the finger on his boat and has a laceration and swelling at the site.     Hospital Follow-up Visit:    Hospital/Nursing Home/IP Rehab Facility: [facility]  Date of Admission: 05/16/2018  Date of Discharge: 05/17/2018  Reason(s) for Admission: Hyponatremia  The patient presented to the clinic with abdominal pain and chills after having a colonoscopy. In the ER, he had tachycardia, hyponatremia (123), hypokalemia (3.2), leukocytosis (17.1). The Acute hyponatremia and hypokalemia was most likely caused by bowel prep for colonoscopy while still taking his chlorthalidone. He had a 26-30 mm polyp removed from the cecum and needs to have another colonoscopy in one year.    The patient with a history of prostate cancer s/p prostatectomy had a CT of the abdomen completed which showed diffuse bladder wall thickening concerning for cystitis vs chronic bladder outlet obstruction.             Problems taking medications regularly:  None       Medication changes since discharge: None       Problems adhering to non-medication therapy:  None    Summary of hospitalization:  CareEverywhere information obtained and reviewed  Diagnostic Tests/Treatments reviewed.  Follow up needed: none  Other Healthcare Providers Involved in Patient s Care:         None  Update since discharge: improved. sodium and potassium levels were normal on 5/22/2018, results reviewed with the pt.     Post Discharge Medication Reconciliation: discharge medications reconciled, continue medications without change.  Plan of care communicated with patient        Problem list and histories reviewed & adjusted, as indicated.  Additional history: as documented    ROS:  Constitutional, HEENT,  "cardiovascular, pulmonary, GI, , musculoskeletal, neuro, skin, endocrine and psych systems are negative, except as otherwise noted.    This document serves as a record of the services and decisions personally performed and made by Nash Walker MD. It was created on his behalf by Ching Pittman, a trained medical scribe. The creation of this document is based on the provider's statements to the medical scribe.  Ching Pittman 11:50 AM 5/29/2018  OBJECTIVE:                                                    /80 (BP Location: Right arm, Patient Position: Chair, Cuff Size: Adult Large)  Pulse 80  Temp 98  F (36.7  C) (Oral)  Ht 1.727 m (5' 8\")  Wt 79.4 kg (175 lb)  SpO2 99%  BMI 26.61 kg/m2 Body mass index is 26.61 kg/(m^2).   GENERAL: healthy, alert, well nourished, well hydrated, no distress  HENT: ear canals- normal; TMs- normal; Nose- normal; Mouth- no ulcers, no lesions  NECK: no tenderness, no adenopathy, no asymmetry, no masses, no stiffness; thyroid- normal to palpation  RESP: lungs clear to auscultation - no rales, no rhonchi, no wheezes  CV: regular rates and rhythm, normal S1 S2, no S3 or S4 and no murmur, no click or rub -  ABDOMEN: soft, no tenderness, no  hepatosplenomegaly, no masses, normal bowel sounds  MS:  1.5-cm laceration across the middle phalanx dorsally, tendons intact, active extension, extremities- no gross deformities noted, no edema  SKIN: no suspicious lesions, no rashes    Diagnostic test results:  none - Reviewed labs completed on 5/22/2018     ASSESSMENT/PLAN:         Steven was seen today for hospital f/u and finger.    Diagnoses and all orders for this visit:    Hyponatremia - Resolved, pt has resumed chlorthalidone. Reviewed colonoscopy prep future colonoscopies including drinking electrolyte rich fluids such as Pedialyte, increasing dose of potassium chloride and holding chlorthalidone with the prep.  -     Exercise Stress Echocardiogram; Future    Prediabetes  -  "    Exercise Stress Echocardiogram; Future    Hypertension goal BP (blood pressure) < 140/90 - Schedule stress test.  -     Exercise Stress Echocardiogram; Future    Laceration of right index finger, foreign body presence unspecified, nail damage status unspecified, initial encounter - Fitted for foam splint.  -     FOAM FINGER SPLINT S    Family history of coronary artery disease - no symptoms, hypertension history - controlled  -     Exercise Stress Echocardiogram; Future    Adenomatous polyp of ascending colon on recent colonoscopy- Followup in one year for repeat colonoscopy.         Risks, benefits and alternatives of treatments discussed. Plan agreed on.      Followup: Return in about 8 months (around 1/29/2019) for Wellness Visit with fasting labs.    See patient instructions.     The information in this document, created by a scribe for me, accurately reflects the services I personally performed and the decisions made by me. I have reviewed and approved this document for accuracy.      Joaquin Walker MD   Pager: [phone]

## 2018-05-29 ENCOUNTER — OFFICE VISIT (OUTPATIENT)
Dept: FAMILY MEDICINE | Facility: CLINIC | Age: 73
End: 2018-05-29
Payer: MEDICARE

## 2018-05-29 VITALS
TEMPERATURE: 98 F | HEIGHT: 68 IN | WEIGHT: 175 LBS | BODY MASS INDEX: 26.52 KG/M2 | HEART RATE: 80 BPM | OXYGEN SATURATION: 99 % | DIASTOLIC BLOOD PRESSURE: 80 MMHG | SYSTOLIC BLOOD PRESSURE: 128 MMHG

## 2018-05-29 DIAGNOSIS — S61.210A LACERATION OF RIGHT INDEX FINGER, FOREIGN BODY PRESENCE UNSPECIFIED, NAIL DAMAGE STATUS UNSPECIFIED, INITIAL ENCOUNTER: ICD-10-CM

## 2018-05-29 DIAGNOSIS — Z82.49 FAMILY HISTORY OF CORONARY ARTERY DISEASE: ICD-10-CM

## 2018-05-29 DIAGNOSIS — D12.2 ADENOMATOUS POLYP OF ASCENDING COLON: ICD-10-CM

## 2018-05-29 DIAGNOSIS — E87.1 HYPONATREMIA: Primary | ICD-10-CM

## 2018-05-29 DIAGNOSIS — R73.03 PREDIABETES: ICD-10-CM

## 2018-05-29 DIAGNOSIS — I10 HYPERTENSION GOAL BP (BLOOD PRESSURE) < 140/90: ICD-10-CM

## 2018-05-29 PROCEDURE — 99214 OFFICE O/P EST MOD 30 MIN: CPT | Performed by: FAMILY MEDICINE

## 2018-05-29 RX ORDER — GUAIFENESIN 600 MG/1
600 TABLET, EXTENDED RELEASE ORAL DAILY
Qty: 20 TABLET | Refills: 0 | COMMUNITY
Start: 2018-05-29 | End: 2020-01-28

## 2018-05-29 NOTE — MR AVS SNAPSHOT
After Visit Summary   5/29/2018    Steven Duong    MRN: [id number]           Patient Information     Date Of Birth          1945        Visit Information        Provider Department      5/29/2018 11:20 AM Nash Walker MD [facility]'s Diagnoses     Hyponatremia    -  1    Prediabetes        Hypertension goal BP (blood pressure) < 140/90        Laceration of right index finger, foreign body presence unspecified, nail damage status unspecified, initial encounter        Family history of coronary artery disease        Adenomatous polyp of ascending colon           Follow-ups after your visit        Future tests that were ordered for you today     Open Future Orders        Priority Expected Expires Ordered    Exercise Stress Echocardiogram Routine  5/29/2019 5/29/2018            Who to contact     If you have questions or need follow up information about today's clinic visit or your schedule please contact [facility] directly at [phone].  Normal or non-critical lab and imaging results will be communicated to you by [employer], letter or phone within 4 business days after the clinic has received the results. If you do not hear from us within 7 days, please contact the clinic through [employer] or phone. If you have a critical or abnormal lab result, we will notify you by phone as soon as possible.  Submit refill requests through Everypost or call your pharmacy and they will forward the refill request to us. Please allow 3 business days for your refill to be completed.          Additional Information About Your Visit        MyChart Information     Everypost gives you secure access to your electronic health record. If you see a primary care provider, you can also send messages to your care team and make appointments. If you have questions, please call your primary care clinic.  If you do not have a primary care provider, please call [phone] and they  "will assist you.        Care EveryWhere ID     This is your Care EveryWhere ID. This could be used by other organizations to access your [facility] medical records  [id number]        Your Vitals Were     Pulse Temperature Height Pulse Oximetry BMI (Body Mass Index)       80 98  F (36.7  C) (Oral) 5' 8\" (1.727 m) 99% 26.61 kg/m2        Blood Pressure from Last 3 Encounters:   05/29/18 128/80   03/06/18 110/72   01/16/18 110/72    Weight from Last 3 Encounters:   05/29/18 175 lb (79.4 kg)   03/06/18 180 lb (81.6 kg)   01/16/18 178 lb (80.7 kg)              We Performed the Following     FOAM FINGER SPLINT S        Primary Care Provider Office Phone # Fax #    Nash Walker [phone]       [address] [facility] [address]        Equal Access to Services     ROYCE GALVIN : Hadii aad ku hadasho [place], waaxda luqadaha, qaybta kaalmada adeegyada, robles vu . So [facility] [phone].    ATENCIÓN: Si habla español, tiene a odell disposición servicios gratuitos de asistencia lingüística. Llame al [phone].    We comply with applicable federal civil rights laws and [place] laws. We do not discriminate on the basis of race, color, national origin, age, disability, sex, sexual orientation, or gender identity.            Thank you!     Thank you for choosing [facility]  for your care. Our goal is always to provide you with excellent care. Hearing back from our patients is one way we can continue to improve our services. Please take a few minutes to complete the written survey that you may receive in the mail after your visit with us. Thank you!             Your Updated Medication List - Protect others around you: Learn how to safely use, store and throw away your medicines at www.disposemymeds.org.          This list is accurate as of 5/29/18 12:11 PM.  Always use your most recent med list.                   Brand Name Dispense Instructions for use " Diagnosis    aspirin 81 MG tablet     100    ONE DAILY        atorvastatin 10 MG tablet    LIPITOR    90 tablet    Take 0.5 tablets (5 mg) by mouth daily    Hyperlipidemia LDL goal <130       chlorthalidone 25 MG tablet    HYGROTON    90 tablet    Take 1 tablet (25 mg) by mouth daily    Hypertension goal BP (blood pressure) < 140/90       cholecalciferol 1000 UNIT tablet    vitamin D3    30 tablet    Take 1 tablet (1,000 Units) by mouth daily    Medicare annual wellness visit, subsequent       fluticasone 50 MCG/ACT spray    FLONASE    48 g    Spray 1-2 sprays into both nostrils daily    Seasonal allergic rhinitis, unspecified chronicity, unspecified trigger       losartan 50 MG tablet    COZAAR    90 tablet    Take 1 tablet (50 mg) by mouth daily    Hypertension goal BP (blood pressure) < 140/90       LUTEIN PO           MUCINEX 600 MG 12 hr tablet   Generic drug:  guaiFENesin     20 tablet    Take 1 tablet (600 mg) by mouth daily        potassium chloride SA 20 MEQ CR tablet    KLOR-CON    180 tablet    Take 2 tablets (40 mEq) by mouth daily    Hypertension goal BP (blood pressure) < 140/90       VITAMIN C PO      Take 1,000 mg by mouth daily

## 2018-05-29 NOTE — NURSING NOTE
"Chief Complaint   Patient presents with     Hospital F/U     Finger     right pointer finger      Initial /80 (BP Location: Right arm, Patient Position: Chair, Cuff Size: Adult Large)  Pulse 80  Temp 98  F (36.7  C) (Oral)  Ht 5' 8\" (1.727 m)  Wt 175 lb (79.4 kg)  SpO2 99%  BMI 26.61 kg/m2 Estimated body mass index is 26.61 kg/(m^2) as calculated from the following:    Height as of this encounter: 5' 8\" (1.727 m).    Weight as of this encounter: 175 lb (79.4 kg).  BP completed using cuff size regular right Arm  Heide Melanietiburcio CMA    "

## 2018-05-30 ENCOUNTER — HOSPITAL ENCOUNTER (OUTPATIENT)
Dept: CARDIOLOGY | Facility: CLINIC | Age: 73
Discharge: HOME | End: 2018-05-30
Attending: FAMILY MEDICINE | Admitting: FAMILY MEDICINE
Payer: MEDICARE

## 2018-05-30 DIAGNOSIS — R73.03 PREDIABETES: ICD-10-CM

## 2018-05-30 DIAGNOSIS — E87.1 HYPONATREMIA: ICD-10-CM

## 2018-05-30 DIAGNOSIS — Z82.49 FAMILY HISTORY OF CORONARY ARTERY DISEASE: ICD-10-CM

## 2018-05-30 DIAGNOSIS — I10 HYPERTENSION GOAL BP (BLOOD PRESSURE) < 140/90: ICD-10-CM

## 2018-05-30 PROCEDURE — 93350 STRESS TTE ONLY: CPT | Mod: 26 | Performed by: INTERNAL MEDICINE

## 2018-05-30 PROCEDURE — 93016 CV STRESS TEST SUPVJ ONLY: CPT | Performed by: INTERNAL MEDICINE

## 2018-05-30 PROCEDURE — 93018 CV STRESS TEST I&R ONLY: CPT | Performed by: INTERNAL MEDICINE

## 2018-05-30 PROCEDURE — 93321 DOPPLER ECHO F-UP/LMTD STD: CPT | Mod: 26 | Performed by: INTERNAL MEDICINE

## 2018-05-30 PROCEDURE — 93017 CV STRESS TEST TRACING ONLY: CPT

## 2018-05-30 PROCEDURE — 93325 DOPPLER ECHO COLOR FLOW MAPG: CPT | Mod: 26 | Performed by: INTERNAL MEDICINE

## 2018-06-03 ENCOUNTER — MYC MEDICAL ADVICE (OUTPATIENT)
Dept: FAMILY MEDICINE | Facility: CLINIC | Age: 73
End: 2018-06-03

## 2018-06-04 NOTE — PROGRESS NOTES
Dear Chivo,    Here is a summary of your recent test results:  normal stress echocardiogram with no evidence of stress-induced  Ischemia (decreased blood flow)             Thank you very much for trusting me and [facility] [place].     Healthy regards,  Joaquin Walker MD

## 2018-06-06 NOTE — TELEPHONE ENCOUNTER
Called #   Telephone Information:   Mobile [phone]     Pt stated he is doing well - and having no symptoms       Brisa Silveira RN, BSN  [facility]

## 2018-06-29 DIAGNOSIS — E78.5 HYPERLIPIDEMIA LDL GOAL <130: ICD-10-CM

## 2018-06-29 RX ORDER — ATORVASTATIN CALCIUM 10 MG/1
TABLET, FILM COATED ORAL
Qty: 90 TABLET | Refills: 3 | OUTPATIENT
Start: 2018-06-29

## 2018-06-29 NOTE — TELEPHONE ENCOUNTER
This should not need refills yet. No change in pharmacy. Alerted pharmacy.  Aide Nguyen RN  [facility]

## 2018-06-29 NOTE — TELEPHONE ENCOUNTER
"Requested Prescriptions   Pending Prescriptions Disp Refills     atorvastatin (LIPITOR) 10 MG tablet [Pharmacy Med Name: ATORVASTATIN TABS 10MG] 90 tablet 3        Last Written Prescription Date:  1.16.18  Last Fill Quantity: 90,  # refills: 3   Last Office Visit: 5/29/2018   Future Office Visit:      Sig: TAKE ONE-HALF (1/2) TABLET (5 MG) DAILY    Statins Protocol Passed    6/29/2018  8:41 AM       Passed - LDL on file in past 12 months    Recent Labs   Lab Test  05/22/18   0855   LDL  79            Passed - No abnormal creatine kinase in past 12 months    No lab results found.            Passed - Recent (12 mo) or future (30 days) visit within the authorizing provider's specialty    Patient had office visit in the last 12 months or has a visit in the next 30 days with authorizing provider or within the authorizing provider's specialty.  See \"Patient Info\" tab in inbasket, or \"Choose Columns\" in Meds & Orders section of the refill encounter.           Passed - Patient is age 18 or older          "

## 2018-07-16 ENCOUNTER — TELEPHONE (OUTPATIENT)
Dept: FAMILY MEDICINE | Facility: CLINIC | Age: 73
End: 2018-07-16

## 2018-07-16 NOTE — TELEPHONE ENCOUNTER
Fax received from [employer] requesting order for Atorvastatin.  Per chart Atorvastatin was ordered on 1/16/2018, #90 with 3 refills.    Pharmacy advised.    Kely Hooper, GABRIELLE, RN, N  [facility] [phone]

## 2018-11-08 DIAGNOSIS — J30.2 SEASONAL ALLERGIC RHINITIS: ICD-10-CM

## 2018-11-08 RX ORDER — FLUTICASONE PROPIONATE 50 MCG
SPRAY, SUSPENSION (ML) NASAL
Qty: 48 G | Refills: 3 | Status: SHIPPED | OUTPATIENT
Start: 2018-11-08 | End: 2018-12-24

## 2018-11-08 NOTE — TELEPHONE ENCOUNTER
"Requested Prescriptions   Pending Prescriptions Disp Refills     fluticasone (FLONASE) 50 MCG/ACT spray [Pharmacy Med Name: FLUTICASONE NJ NS SP 16GM RX 50MCG]  Last Written Prescription Date:  01/16/2018  Last Fill Quantity: 48 g,  # refills: 3   Last office visit: 5/29/2018 with prescribing provider:  Nash Walker MD    Future Office Visit:     48 g 3     Sig: USE 1 TO 2 SPRAYS IN EACH NOSTRIL DAILY    Inhaled Steroids Protocol Passed    11/8/2018 10:21 AM       Passed - Patient is age 12 or older       Passed - Recent (12 mo) or future (30 days) visit within the authorizing provider's specialty    Patient had office visit in the last 12 months or has a visit in the next 30 days with authorizing provider or within the authorizing provider's specialty.  See \"Patient Info\" tab in inbasket, or \"Choose Columns\" in Meds & Orders section of the refill encounter.                "

## 2018-12-24 ENCOUNTER — TELEPHONE (OUTPATIENT)
Dept: FAMILY MEDICINE | Facility: CLINIC | Age: 73
End: 2018-12-24

## 2018-12-24 ENCOUNTER — OFFICE VISIT (OUTPATIENT)
Dept: FAMILY MEDICINE | Facility: CLINIC | Age: 73
End: 2018-12-24
Payer: MEDICARE

## 2018-12-24 VITALS
WEIGHT: 176 LBS | HEART RATE: 95 BPM | OXYGEN SATURATION: 100 % | TEMPERATURE: 97.4 F | BODY MASS INDEX: 26.67 KG/M2 | DIASTOLIC BLOOD PRESSURE: 80 MMHG | HEIGHT: 68 IN | SYSTOLIC BLOOD PRESSURE: 130 MMHG

## 2018-12-24 DIAGNOSIS — R73.03 PREDIABETES: ICD-10-CM

## 2018-12-24 DIAGNOSIS — I10 HYPERTENSION GOAL BP (BLOOD PRESSURE) < 140/90: ICD-10-CM

## 2018-12-24 DIAGNOSIS — H81.03 MENIERE'S DISEASE, BILATERAL: Primary | ICD-10-CM

## 2018-12-24 DIAGNOSIS — Z51.81 MEDICATION MONITORING ENCOUNTER: ICD-10-CM

## 2018-12-24 DIAGNOSIS — C61 PROSTATE CANCER (H): ICD-10-CM

## 2018-12-24 LAB
ERYTHROCYTE [DISTWIDTH] IN BLOOD BY AUTOMATED COUNT: 12 % (ref 10–15)
HCT VFR BLD AUTO: 50.9 % (ref 40–53)
HGB BLD-MCNC: 18 G/DL (ref 13.3–17.7)
MCH RBC QN AUTO: 32 PG (ref 26.5–33)
MCHC RBC AUTO-ENTMCNC: 35.4 G/DL (ref 31.5–36.5)
MCV RBC AUTO: 90 FL (ref 78–100)
PLATELET # BLD AUTO: 204 10E9/L (ref 150–450)
RBC # BLD AUTO: 5.63 10E12/L (ref 4.4–5.9)
WBC # BLD AUTO: 7.2 10E9/L (ref 4–11)

## 2018-12-24 PROCEDURE — 85027 COMPLETE CBC AUTOMATED: CPT | Performed by: FAMILY MEDICINE

## 2018-12-24 PROCEDURE — 99214 OFFICE O/P EST MOD 30 MIN: CPT | Performed by: FAMILY MEDICINE

## 2018-12-24 PROCEDURE — 80053 COMPREHEN METABOLIC PANEL: CPT | Performed by: FAMILY MEDICINE

## 2018-12-24 PROCEDURE — 36415 COLL VENOUS BLD VENIPUNCTURE: CPT | Performed by: FAMILY MEDICINE

## 2018-12-24 RX ORDER — DIPHENOXYLATE HYDROCHLORIDE AND ATROPINE SULFATE 2.5; .025 MG/1; MG/1
TABLET ORAL
COMMUNITY
Start: 2009-05-01

## 2018-12-24 ASSESSMENT — MIFFLIN-ST. JEOR: SCORE: 1522.83

## 2018-12-24 NOTE — PROGRESS NOTES
SUBJECTIVE:   Steven Duong is a 73 year old male who presents to clinic today for the following health issues:    Patient is calling with symptoms of dizziness    Dizziness   started 12/22/18     Duration: started 12/22/18  Dx with meniers    Description   Feeling faint:  no   Feeling like the surroundings are moving: no   Loss of consciousness or falls: no     Intensity:  mild    Accompanying signs and symptoms:   Nausea/vomitting: no   Palpitations: no   Weakness in arms or legs: no   Vision or speech changes: no   Ringing in ears (Tinnitus): no   Hearing loss related to dizziness: not new  Other (fevers/chills/sweating/dyspnea): no   Denies vision loss    History (similar episodes/head trauma/previous evaluation/recent bleeding): previous eval- has Meniere's     Precipitating or alleviating factors (new meds/chemicals): None  Worse with activity/head movement: YES    Therapies tried and outcome: decreased sodium diet    Advised appointment today- patient is in agreement    Scheduled with Dr. Moses    Advised if increasing symptoms/sob/chest pain/vision loss- call 911   Patient verbalized understanding     Mandy Siegel RN BSN  [facility]    Patient reports he was grabbing something from the trunk and fell down. Patient denies ice or anything causing the fall, just an off balance sensation. Patient has Hx of Meniere's disease. 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 Patient reports he feels the off-balance dizziness is a symptom of his Meniere's disease returning. Patient denies CP, SOB, edema, and palpitations. Patient reports normal bowels, bladder, appetite, and sleep. No other new concerns/issues    Hypertension: Patient presents today as normotensive. Patient is currently prescribed 25 mg Chlorthalidone daily and 50 mg Losartan daily for hypertension management.    BP Readings from Last 5 Encounters:   12/24/18 130/80   05/29/18 128/80    03/06/18 110/72   01/16/18 110/72   03/27/17 110/78     Creatinine   Date Value Ref Range Status   12/24/2018 1.03 0.66 - 1.25 mg/dL Final     Prediabetes: Stable.    Glucose   Date Value Ref Range Status   12/24/2018 96 70 - 99 mg/dL Final   05/22/2018 119 (H) 70 - 99 mg/dL Final   05/17/2018 112 (H) 65 - 100 mg/dL Final   02/12/2018 117 (H) 70 - 99 mg/dL Final   01/16/2018 111 (H) 70 - 99 mg/dL Final     Comment:     Fasting specimen     Problem list and histories reviewed & adjusted, as indicated.  Additional history: as documented    body mass index is 26.76 kg/m .    Wt Readings from Last 4 Encounters:   12/24/18 79.8 kg (176 lb)   05/29/18 79.4 kg (175 lb)   03/06/18 81.6 kg (180 lb)   01/16/18 80.7 kg (178 lb)       Health Maintenance    Health Maintenance Due   Topic Date Due     ZOSTER IMMUNIZATION (1 of 2) 03/27/1996     DTAP/TDAP/TD IMMUNIZATION (2 - Td) 07/11/2018     FALL RISK ASSESSMENT  01/16/2019     WELLNESS VISIT Q1 YR  01/16/2019       Current Problem List    Patient Active Problem List   Diagnosis     Meniere's disease     Seasonal allergic rhinitis     Family history of coronary artery disease     Hyperlipidemia LDL goal <130     Hypertension goal BP (blood pressure) < 140/90     Advanced directives, counseling/discussion     h/o prostate cancer (H) - S/p prostatectomy at 54 yo (2001)     Prediabetes     Chronic right-sided low back pain with right-sided sciatica     Adenomatous polyp of ascending colon       Past Medical History    Past Medical History:   Diagnosis Date     Hyperlipidemia LDL goal < 130      Hypertension goal BP (blood pressure) < 140/90      Meniere's disease, unspecified      Pericarditis 2007     Personal history of prostate cancer 2001       Past Surgical History    Past Surgical History:   Procedure Laterality Date     SUPRAPUBIC PROSTATECTOMY  2001     TONSILLECTOMY  1953       Current Medications    Current Outpatient Medications   Medication Sig Dispense Refill      ASPIRIN 81 MG OR TABS ONE DAILY 100 3     atorvastatin (LIPITOR) 10 MG tablet Take 0.5 tablets (5 mg) by mouth daily 90 tablet 3     chlorthalidone (HYGROTON) 25 MG tablet Take 1 tablet (25 mg) by mouth daily 90 tablet 3     cholecalciferol (VITAMIN D3) 1000 UNIT tablet Take 1 tablet (1,000 Units) by mouth daily 30 tablet      fluticasone (FLONASE) 50 MCG/ACT spray Spray 1-2 sprays into both nostrils daily 48 g 3     guaiFENesin (MUCINEX) 600 MG 12 hr tablet Take 1 tablet (600 mg) by mouth daily 20 tablet 0     losartan (COZAAR) 50 MG tablet Take 1 tablet (50 mg) by mouth daily 90 tablet 3     LUTEIN PO        Multiple Vitamin (MULTI-VITAMINS) TABS        potassium chloride SA (KLOR-CON) 20 MEQ CR tablet Take 2 tablets (40 mEq) by mouth daily 180 tablet 3       Allergies    Allergies   Allergen Reactions     Penicillins      Other reaction(s): Throat Swelling/Closing       Immunizations    Immunization History   Administered Date(s) Administered     Pneumo Conj 13-V (2010&after) 11/18/2016     Pneumococcal 23 valent 09/07/2012     TDAP Vaccine (Adacel) 07/11/2008     Tdap (Adacel,Boostrix) 01/12/2012       Family History    Family History   Problem Relation Age of Onset     Coronary Artery Disease Paternal Grandfather      Cerebrovascular Disease Mother         with associated parkinsons     Coronary Artery Disease Father 64        bypass x 2 (initial at 65 yo)     Prostate Cancer Father 70     Prostate Cancer Brother      Brain Cancer Son 13     Breast Cancer Daughter      Liver Cancer Daughter      Melanoma Daughter         melanoma     Prostate Cancer Brother      Colon Cancer No family hx of        Social History    Social History     Socioeconomic History     Marital status:      Spouse name: Lalitha     Number of children: 2     Years of education: Not on file     Highest education level: Not on file   Social Needs     Financial resource strain: Not on file     Food insecurity - worry: Not on file      "Food insecurity - inability: Not on file     Transportation needs - medical: Not on file     Transportation needs - non-medical: Not on file   Occupational History     Occupation: sales     Employer: [employer]   Tobacco Use     Smoking status: Never Smoker     Smokeless tobacco: Never Used   Substance and Sexual Activity     Alcohol use: Yes     Alcohol/week: 7.0 oz     Comment: 1-2 beers nightly     Drug use: No     Sexual activity: Yes     Partners: Female     Birth control/protection: Surgical   Other Topics Concern      Service Not Asked     Blood Transfusions Not Asked     Caffeine Concern No     Comment: 1 serv/day     Occupational Exposure Not Asked     Hobby Hazards Not Asked     Sleep Concern Yes     Comment: occ with travel for work     Stress Concern Not Asked     Weight Concern Not Asked     Special Diet Not Asked     Back Care Not Asked     Exercise Yes     Comment: regular     Bike Helmet Not Asked     Seat Belt Yes     Self-Exams Not Asked     Parent/sibling w/ CABG, MI or angioplasty before 65F 55M? No   Social History Narrative     Not on file       All above reviewed and updated, all stable unless otherwise noted    Recent labs reviewed    ROS:  Constitutional, HEENT, cardiovascular, pulmonary, GI, , musculoskeletal, neuro, skin, endocrine and psych systems are negative, except as otherwise noted.    OBJECTIVE:                                                    /80 (BP Location: Right arm, Patient Position: Chair, Cuff Size: Adult Regular)   Pulse 95   Temp 97.4  F (36.3  C) (Oral)   Ht 1.727 m (5' 8\")   Wt 79.8 kg (176 lb)   SpO2 100%   BMI 26.76 kg/m    Body mass index is 26.76 kg/m .  GENERAL: healthy, alert and no distress  EYES: Eyes grossly normal to inspection  HENT:ear canals and TM's normal upon viewing with otoscope, nose and mouth without ulcers or lesions upon viewing with otoscope otosclerosis present bilaterally  NECK: no tenderness, no adenopathy, no " asymmetry, no masses, no stiffness; thyroid- normal to palpation  RESP: lungs clear to auscultation - no rales, no rhonchi, no wheezes  CV: regular rates and rhythm, normal S1 S2, no S3 or S4 and no murmur, no click or rub -  ABDOMEN: soft, no tenderness, no  hepatosplenomegaly, no masses, normal bowel sounds  MS: extremities- no gross deformities noted, no edema  SKIN: no suspicious lesions, no rashes  NEURO: strength and tone- normal, sensory exam- grossly normal, mentation- intact, speech- normal, Nylen Barany maneuvers negative  BACK: no CVA tenderness, no paralumbar tenderness  PSYCH: Alert and oriented times 3; speech- coherent , normal rate and volume; able to articulate logical thoughts, able to abstract reason, no tangential thoughts, no hallucinations or delusions, affect- normal    DIAGNOSTICS/PROCEDURES:                                                      No results found for this or any previous visit (from the past 24 hour(s)).     Labs Pending     ASSESSMENT/PLAN:                                                        ICD-10-CM    1. Meniere's disease, bilateral H81.03 Comprehensive metabolic panel     CBC with platelets     PHYSICAL THERAPY REFERRAL   2. Hypertension goal BP (blood pressure) < 140/90 I10    3. Prediabetes R73.03    4. h/o prostate cancer (H) - S/p prostatectomy at 54 yo (2001) C61    5. Medication monitoring encounter Z51.81        Discussed treatment/modality options, including risk and benefits, he desires advised aspirin 81 mg po daily, advised 1 multivitamin per day, advised calcium 5592-9087 mg/d and Vitamin D 800-1200 IU/d, advised dentist every 6 months, advised diet and exercise and advised opthalmologist every 1-2 years. All diagnosis above reviewed and noted above, otherwise stable.  See [employer] orders for further details.  Follow up as needed.    1) Patient presented today with Dizziness. Patient referred to PT today for dizziness management. Declines steroid burst and  taper.    2) Follow up if symptoms persist or worsen.    3) Patient presents today as normotensive. Patient is currently prescribed 25 mg Chlorthalidone daily and 50 mg Losartan daily for hypertension management. Advised continued use.    4) Follow up in 1 month for annual physical exam.    Health Maintenance Due   Topic Date Due     ZOSTER IMMUNIZATION (1 of 2) 03/27/1996     DTAP/TDAP/TD IMMUNIZATION (2 - Td) 07/11/2018     FALL RISK ASSESSMENT  01/16/2019     WELLNESS VISIT Q1 YR  01/16/2019     This document serves as a record of the services and decisions personally performed and made by Hunter Moses MD. It was created on his behalf by Myke Grimaldo, a trained medical scribe. The creation of this document is based on the provider's statements to the medical scribe.  Myke Grimaldo December 24, 2018 12:18 PM     The information in this document, created by the medical scribe for me, accurately reflects the services I personally performed and the decisions made by me. I have reviewed and approved this document for accuracy prior to leaving the patient care area.  December 24, 2018            Hunter Moses MD [address]  [phone] Fax

## 2018-12-24 NOTE — PATIENT INSTRUCTIONS
Referred to PT today.    [facility] (formerly [facility] - [place]                        To reach your care team during and after hours:   [phone]  To reach our pharmacy:        [phone]    Clinic Hours                        Our clinic hours are:    Monday   7:30 am to 7:00 pm                  Tuesday through Friday 7:30 am to 5:00 pm                             Saturday   8:00 am to 12:00 pm      Sunday   Closed      Pharmacy Hours                        Our pharmacy hours are:    Monday   8:30 am to 7:00 pm       Tuesday to Friday  8:30 am to 6:00 pm                       Saturday    9:00 am to 1:00 pm              Sunday    Closed              There is also information available at our web site:  [email or web address]    If your provider ordered any lab tests and you do not receive the results within 10 business days, please call the clinic.    If you need a medication refill please contact your pharmacy.  Please allow 2-3 business days for your refill to be completed.    Our clinic offers telephone visits and e visits.  Please ask one of your team members to explain more.      Use [facility] (secure email communication and access to your chart) to send your primary care provider a message or make an appointment. Ask someone on your Team how to sign up for [facility].  Immunizations                      Immunization History   Administered Date(s) Administered     Pneumo Conj 13-V (2010&after) 11/18/2016     Pneumococcal 23 valent 09/07/2012     TDAP Vaccine (Adacel) 07/11/2008     Tdap (Adacel,Boostrix) 01/12/2012        Health Maintenance                         Health Maintenance Due   Topic Date Due     Zoster (Chicken Pox) Vaccine (1 of 2) 03/27/1996     Diptheria Tetanus Pertussis (DTAP/TDAP/TD) Vaccine (2 - Td) 07/11/2018     FALL RISK ASSESSMENT  01/16/2019     Wellness Visit with your Primary Provider - yearly  01/16/2019

## 2018-12-24 NOTE — TELEPHONE ENCOUNTER
11: 40 am  Dr. Josué engel [place]  Patient is calling with symptoms of dizziness-  Dizziness   started 12/22/18     Duration: started 12/22/18  Dx with meniers    Description   Feeling faint:  no   Feeling like the surroundings are moving: no   Loss of consciousness or falls: no     Intensity:  mild    Accompanying signs and symptoms:   Nausea/vomitting: no   Palpitations: no   Weakness in arms or legs: no   Vision or speech changes: no   Ringing in ears (Tinnitus): no   Hearing loss related to dizziness: not new  Other (fevers/chills/sweating/dyspnea): no   Denies vision loss    History (similar episodes/head trauma/previous evaluation/recent bleeding): previous eval- has Meniere's     Precipitating or alleviating factors (new meds/chemicals): None  Worse with activity/head movement: YES    Therapies tried and outcome: decreased sodium diet    Advised appointment today- patient is in agreement    Scheduled with Dr. Moses    Advised if increasing symptoms/sob/chest pain/vision loss- call 911   Patient verbalized understanding    Mandy SiegelRN BSN  [facility]  [phone]

## 2018-12-26 LAB
ALBUMIN SERPL-MCNC: 4.1 G/DL (ref 3.4–5)
ALP SERPL-CCNC: 47 U/L (ref 40–150)
ALT SERPL W P-5'-P-CCNC: 27 U/L (ref 0–70)
ANION GAP SERPL CALCULATED.3IONS-SCNC: 5 MMOL/L (ref 3–14)
AST SERPL W P-5'-P-CCNC: 20 U/L (ref 0–45)
BILIRUB SERPL-MCNC: 0.8 MG/DL (ref 0.2–1.3)
BUN SERPL-MCNC: 16 MG/DL (ref 7–30)
CALCIUM SERPL-MCNC: 9.4 MG/DL (ref 8.5–10.1)
CHLORIDE SERPL-SCNC: 96 MMOL/L (ref 94–109)
CO2 SERPL-SCNC: 29 MMOL/L (ref 20–32)
CREAT SERPL-MCNC: 1.03 MG/DL (ref 0.66–1.25)
GFR SERPL CREATININE-BSD FRML MDRD: 72 ML/MIN/{1.73_M2}
GLUCOSE SERPL-MCNC: 96 MG/DL (ref 70–99)
POTASSIUM SERPL-SCNC: 4.4 MMOL/L (ref 3.4–5.3)
PROT SERPL-MCNC: 7.3 G/DL (ref 6.8–8.8)
SODIUM SERPL-SCNC: 130 MMOL/L (ref 133–144)

## 2018-12-28 DIAGNOSIS — E87.1 LOW SODIUM LEVELS: ICD-10-CM

## 2018-12-28 DIAGNOSIS — D58.2 ELEVATED HEMOGLOBIN (H): Primary | ICD-10-CM

## 2018-12-31 ENCOUNTER — HOSPITAL ENCOUNTER (OUTPATIENT)
Dept: PHYSICAL THERAPY | Facility: CLINIC | Age: 73
Setting detail: THERAPIES SERIES
End: 2018-12-31
Attending: FAMILY MEDICINE
Payer: MEDICARE

## 2018-12-31 DIAGNOSIS — H81.03 MENIERE'S DISEASE, BILATERAL: ICD-10-CM

## 2018-12-31 PROCEDURE — 97110 THERAPEUTIC EXERCISES: CPT | Mod: GP | Performed by: PHYSICAL THERAPIST

## 2018-12-31 PROCEDURE — G8981 BODY POS CURRENT STATUS: HCPCS | Mod: GP,CI | Performed by: PHYSICAL THERAPIST

## 2018-12-31 PROCEDURE — G8982 BODY POS GOAL STATUS: HCPCS | Mod: GP,CI | Performed by: PHYSICAL THERAPIST

## 2018-12-31 PROCEDURE — G8983 BODY POS D/C STATUS: HCPCS | Mod: GP,CI | Performed by: PHYSICAL THERAPIST

## 2018-12-31 PROCEDURE — 97161 PT EVAL LOW COMPLEX 20 MIN: CPT | Mod: GP | Performed by: PHYSICAL THERAPIST

## 2018-12-31 NOTE — PROGRESS NOTES
[facility]        OUTPATIENT PHYSICAL THERAPY FUNCTIONAL EVALUATION  PLAN OF TREATMENT FOR OUTPATIENT REHABILITATION  (COMPLETE FOR INITIAL CLAIMS ONLY)  Patient's Last Name, First Name, M.I.  YOB: 1945  Steven Duong     Provider's Name   [facility]   Medical Record No.  [id number]     Start of Care Date:  12/31/18   Onset Date:  12/22/18   Type:     _X__PT   ____OT  ____SLP Medical Diagnosis:  Meniere's disease     PT Diagnosis:  Functional baseline Visits from SOC:  1                              __________________________________________________________________________________  Plan of Treatment/Functional Goals:  stretching           GOALS  Not applicable                                                                                          Therapy Frequency:  other (see comments)   Predicted Duration of Therapy Intervention:  PT not indicated at this time    Sienna Anguiano, PT                                    I CERTIFY THE NEED FOR THESE SERVICES FURNISHED UNDER        THIS PLAN OF TREATMENT AND WHILE UNDER MY CARE     (Physician co-signature of this document indicates review and certification of the therapy plan).                Certification Date From:  12/31/18   Certification Date To:  12/31/18    Referring Provider:  Hunter Moses MD    Initial Assessment  See Epic Evaluation- Start of Care Date: 12/31/18

## 2018-12-31 NOTE — PROGRESS NOTES
12/31/18 1400   Signing Clinician's Name / Credentials   Signing clinician's name / credentials Sienna Anguiano DPT   Functional Gait Assessment (DAMION Villa, MIRNA Fields, et al. (2004))   1. GAIT LEVEL SURFACE 3   2. CHANGE IN GAIT SPEED 3   3. GAIT WITH HORIZONTAL HEAD TURNS 3   4. GAIT WITH VERTICAL HEAD TURNS 3   5. GAIT AND PIVOT TURN 3   6. STEP OVER OBSTACLE 3   7. GAIT WITH NARROW BASE OF SUPPORT 3   8. GAIT WITH EYES CLOSED 3   9. AMBULATING BACKWARDS 3   10. STEPS 3   Total Functional Gait Assessment Score   TOTAL SCORE: (MAXIMUM SCORE 30) 30   Functional Gait Assessment (FGA): The FGA assesses postural stability during various walking tasks.   Gait assistive device used: None     Patient Score: 30/30  Scores of <22/30 have been correlated with predicting falls in community-dwelling older adults according to Daisy & Teo 2010.   Scores of <18/30 have been correlated with increased risk for falls in patients with Parkinsons Disease according to Phan Beavers, Marcelo et al 2014.  Minimal Detectable Change for patients with acute/chronic stroke = 4.2 according to Thisulaiman & Ritschel 2009  Minimal Detectable Change for patients with vestibular disorder = 8 according to Daisy & Teo 2010    Assessment (rationale for performing, application to patient s function & care plan): Assess risk for falls.  Minutes billed as physical performance test: 0

## 2019-01-02 ENCOUNTER — TELEPHONE (OUTPATIENT)
Dept: FAMILY MEDICINE | Facility: CLINIC | Age: 74
End: 2019-01-02

## 2019-01-02 DIAGNOSIS — E87.1 LOW SODIUM LEVELS: ICD-10-CM

## 2019-01-02 DIAGNOSIS — D58.2 ELEVATED HEMOGLOBIN (H): ICD-10-CM

## 2019-01-02 LAB — HGB BLD-MCNC: 17.3 G/DL (ref 13.3–17.7)

## 2019-01-02 PROCEDURE — 36415 COLL VENOUS BLD VENIPUNCTURE: CPT | Performed by: FAMILY MEDICINE

## 2019-01-02 PROCEDURE — 85018 HEMOGLOBIN: CPT | Performed by: FAMILY MEDICINE

## 2019-01-02 PROCEDURE — 84295 ASSAY OF SERUM SODIUM: CPT | Performed by: FAMILY MEDICINE

## 2019-01-02 NOTE — TELEPHONE ENCOUNTER
----- Message from Sienna Anguiano PT sent at 12/31/2018  3:05 PM CST -----  Regarding: Physical therapy evaluation  Hello, I am a new flexible workforce physical therapist with [employer].     I had the opportunity to evaluate your patient Chivo today in physical therapy. He denies dizziness but more reported lightheadedness throughout the session. I assessed his blood pressure which was 140/70mmHg in supine, and it dropped to 114/70mmHg with lightheadedness lasting 10 seconds in standing.     I advised the patient to follow-up with his PCP to discuss this finding and he requested that Dr. Moses be included on this message. If you have any questions, please do not hesitate to ask.    Thank you,  PARTHA BeckfordT

## 2019-01-02 NOTE — PROGRESS NOTES
" 12/31/18 1300   Quick Adds   Quick Adds Certification;Vestibular Eval   Type of Visit Initial OP PT Evaluation   General Information   Start of Care Date 12/31/18   Referring Physician Hunter Moses MD   Orders Evaluate and Treat as Indicated   Order Date 12/24/18   Medical Diagnosis Meniere's disease   Onset of illness/injury or Date of Surgery 12/22/18   Precautions/Limitations no known precautions/limitations   Surgical/Medical history reviewed Yes   Pertinent history of current vestibular problem (include personal factors and/or comorbidities that impact the POC)  Hearing loss;Prior concussion(s)   Hearing Loss Comments Complete hearing loss on the left, hearing aid on the right.   Pertinent history of current problem (include personal factors and/or comorbidities that impact the POC) Patient has a history of Meniere's disease 30 years ago, HTN, prostate cancer, s/p prostatectomy 2001, and pericarditis 2007. Patient reports that he was reaching into his trunk of his car and fell on 12/22/18. Patient went to the doctor because he was \"fuzzy\", landed on his shoulder and arm but denies hitting his head. Patient reports he occasionally feels lightheaded but is unable to specify when or establish a pattern. Patient reports that his balance is good, he works out and is conscious to prevent decline. Patient has a history of vertigo for 1 day in 2008 that self resolved. Patient denies issues since he fell. Patient held off on driving and was concerned but now feels back to normal. Patient has no hearing in the left ear, had tinnitus but no longer, denies fullness or pressure in his ears, patient denies numbness and tingling, denies medication for lightheadedness.    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   Fall Risk Screen   Fall screen completed by PT   Have you fallen 2 or more times in the past year? No   Have you fallen and had an injury in the past year? No   Is patient a fall risk? No   Fall screen comments Minimal history of falls, good balance   Pain   Patient currently in pain No   Vitals Signs   Heart Rate 91   SpO2 98   Blood Pressure 126/64   Vital Signs Comments Resting, seated, left arm   Cognitive Status Examination   Orientation orientation to person, place and time   Level of Consciousness alert   Follows Commands and Answers Questions able to follow multistep instructions;100% of the time   Personal Safety and Judgment intact   Memory intact   Integumentary   Integumentary No deficits were identified   Posture   Posture Forward head position   Strength   Manual Muscle Testing Quick Adds No deficits were identified   Strength Comments Observed through functional testing   Bed Mobility   Bed Mobility Comments Independent   Transfer Skills   Transfer Comments Independent with no UE use   Gait   Gait Comments Patient ambulates with good speed, heel strike bilaterally and no evidence of imbalance.   Gait Special Tests   Gait Special Tests 25 FOOT TIMED WALK;FUNCTIONAL GAIT ASSESSMENT   Gait Special Tests 25 Foot Timed Walk   Seconds 5.71   Steps 11 Steps   Comments no AD   Gait Special Tests Functional Gait Assessment Score out of 30   Score out of 30 30   Comments WNL   Balance Special Tests   Balance Special Tests Modified CTSIB  Conditions   Balance Special Tests Modified CTSIB Conditions   Condition 1, seconds 30 Seconds   Condition 2, seconds 30 Seconds   Condition 4, seconds 30 Seconds   Condition 5, seconds 30 Seconds   Modified CTSIB Comments Mild increase in postural sway with condition 5   Sensory Examination   Sensory Perception other (describe)   Sensory Perception Comments Patient denied numbness or tingling, reports his hands and feet get cold however.   Coordination   Coordination no deficits were identified   Muscle Tone   Muscle Tone no deficits were identified   Cervicogenic Screen   Neck ROM Mild limitations in extension and sidebending bilaterally, denies discomfort but notes a stretch. Patient notes stiffness secondary to frequent exercise, gets a massage 1x per month.   Oculomotor Exam   Smooth Pursuit Normal   Saccades Abnormal   Saccades Comments Corrective saccade for undershooting with vertical saccades   VOR Normal   Rapid Head Thrust Normal   Convergence Testing Abnormal  (7 cm with glasses)   Infrared Goggle Exam or Frenzel Lense Exam   Vestibular Suppressant in Last 24 Hours? No   Exam completed with Infrared Goggles   Spontaneous Nystagmus Horizontal R   Gaze Evoked Nystagmus Horizontal R   Gaze Evoked Nystagmus comments With right gaze and upward gaze   Head Shake Horizontal Nystagmus Horizontal R   Positional Testing comments Not tested   Dynamic Visual Acuity (DVA)   Static Acuity (LogMar) Line 9   Horizontal Head Movement at 1 Hz (LogMar) Line 7   Horizontal Head Movement at 2 Hz (LogMar) Line 5   DVA Comments History of Meniere's disease, patient denies oscillopsia or double vision   Modality Interventions   Planned Modality Interventions Comments N/A   Planned Therapy Interventions   Planned Therapy Interventions stretching   Clinical Impression   Criteria for Skilled Therapeutic Interventions Met evaluation only;current level of function same as previous level of function   PT Diagnosis Functional baseline    Influenced by the following impairments Blood pressure change from supine to standing, lightheadedness, head thrust test   Functional limitations due to impairments Apparent lightheadedness with position changes   Clinical Presentation Evolving/Changing   Clinical Presentation Rationale Patient presents with s/s of possible orthostatic hypotension or excessive HTN medication dosage   Clinical Decision Making (Complexity) Low complexity   Therapy Frequency other (see comments)   Predicted Duration of Therapy Intervention (days/wks) PT not indicated at this time   Risk & Benefits of therapy have been explained Yes   Patient, Family & other staff in agreement with plan of care Yes   Clinical Impression Comments Patient is a 73 year old male presenting to physical therapy with a history of Meniere's disease following a fall on 12/22/18. Patient presents with minimal signs of peripheral vestibular involvement and denies dizziness or physical therapy. Patient presents with s/s of possible orthostatic hypotension, advised to followup with his PCP.    PT G-Codes   G-code Changing and maintaining body position   Changing and Maintaining Body Position Current Status () CI   Changing & Maintaining Body Position Current Status Modifier Rationale Lightheadedness with supine to stand, able to self stabilize and resolves quickly    Education Assessment   Preferred Learning Style Demonstration   Barriers to Learning No barriers   Total Evaluation Time   PT Rolanda Low Complexity Minutes (75544) 30   Therapy Certification   Certification date from 12/31/18   Certification date to 12/31/18   Medical Diagnosis Meniere's disease   Certification I certify the need for these services furnished under this plan of treatment and while under my care.  (Physician co-signature of this document indicates review and certification of the therapy plan).

## 2019-01-03 ENCOUNTER — MYC MEDICAL ADVICE (OUTPATIENT)
Dept: FAMILY MEDICINE | Facility: CLINIC | Age: 74
End: 2019-01-03

## 2019-01-03 LAB — SODIUM SERPL-SCNC: 127 MMOL/L (ref 133–144)

## 2019-01-04 NOTE — RESULT ENCOUNTER NOTE
Dear Chivo,    Here is a summary of your recent test results:  -Hemoglobin is normal.  There is no evidence of anemia.  -Sodium is decreased.  ADVISE: holding your diuretic medication until your upcoming appointment. .    For additional lab test information, [email or web address] is an excellent reference.           Thank you very much for trusting me and [facility].     Healthy regards,  Joaquin Walker MD

## 2019-01-04 NOTE — TELEPHONE ENCOUNTER
Called patient @ # below    Advised of MD MENDOZA message below - Patient stated an understanding and agreed with plan.    Viv Coleman RN  [facility]  
Called patient @ # below    Patient stated he is low on sodium so MD MENDOZA suggested that patient stop taking his diuretic until he see's him on the 01/17/2019.   Patient stated he looked up the chlorthalidone and the directions state not to discontinue this medication abruptly, needs to ween off.   Does patient need to ween?    Patient also wondering if there are foods high in sodium that he should be consuming.       Routing to PCP for further review/recommendations/orders.    Viv Coleman RN  [facility]      
It is ok to just stop this and not wean it.       
Patient has some additional questions on this.  Leaving tomorrow so would like call back today.    Please call.    Ph. [phone] ok to leave detailed message.    Reyna Evans      
Routing to PCP for further review/recommendations/orders.    Viv Coleman RN  [facility]    
no

## 2019-01-15 NOTE — PROGRESS NOTES
"  SUBJECTIVE:   Steven Duong is a 73 year old male who presents for Preventive Visit.    Are you in the first 12 months of your Medicare Part B coverage?  No    Physical Health:    In general, how would you rate your overall physical health? excellent    Outside of work, how many days during the week do you exercise? 2-3 days/week    Outside of work, approximately how many minutes a day do you exercise?treadmill- push up chin up    If you drink alcohol do you typically have >3 drinks per day or >7 drinks per week? No    Do you usually eat at least 4 servings of fruit and vegetables a day, include whole grains & fiber and avoid regularly eating high fat or \"junk\" foods? Yes    Do you have any problems taking medications regularly?  No    Do you have any side effects from medications? none    Needs assistance for the following daily activities: no assistance needed    Which of the following safety concerns are present in your home?  none identified     Hearing impairment: wears hearing aids    In the past 6 months, have you been bothered by leaking of urine? no        Mental Health:    In general, how would you rate your overall mental or emotional health? excellent  PHQ-2 Score:      Do you feel safe in your environment? Yes    Do you have a Health Care Directive? Yes: Patient states has Advance Directive and will bring in a copy to clinic.    Additional concerns to address?  No    Fall risk:  Fallen 2 or more times in the past year?: No  Any fall with injury in the past year?: No    Cognitive Screenin) Repeat 3 items (Leader, Season, Table)    2) Clock draw: NORMAL  3) 3 item recall: Recalls 3 objects  Results: 3 items recalled: COGNITIVE IMPAIRMENT LESS LIKELY    Mini-CogTM Copyright KESHAWN Butcher. Licensed by the author for use in [facility]; reprinted with permission (mikie@.[facility]). All rights reserved.      Do you have sleep apnea, excessive snoring or daytime drowsiness?: no    Hyperlipidemia " Follow-Up    Rate your low fat/cholesterol diet?: good    Taking statin?  Yes, no muscle aches from statin    Other lipid medications/supplements?:  none    Chivo is doing well on atorvastatin (.5 tablet) daily without complication.    Hypertension Follow-up    Outpatient blood pressures are not being checked.    Low Salt Diet: low salt    Chivo's exercise regimen includes using a treadmill and muscle toning 2-3x a week -- notes that he would be mowing his grass if it wasn't winter. He is doing well on losartan daily without complications.    Dizziness  Chivo has discontinued his diuretic and with added salt to his meal, his symptoms have resolved.    Pre-diabetes  Reviewed blood sugar levels which displayed that he's still within range of pre-diabetic.       Reviewed and updated as needed this visit by clinical staff  Tobacco  Allergies  Meds  Problems  Med Hx  Surg Hx  Fam Hx  Soc Hx          Reviewed and updated as needed this visit by Provider  Tobacco  Allergies  Meds  Problems  Med Hx  Surg Hx  Fam Hx        Social History     Tobacco Use     Smoking status: Never Smoker     Smokeless tobacco: Never Used   Substance Use Topics     Alcohol use: Yes     Alcohol/week: 7.0 oz     Comment: 1-2 beers nightly                           Current providers sharing in care for this patient include:   Patient Care Team:  Nash Walker MD as PCP - General  Nash Walker MD as PCP - Assigned PCP    The following health maintenance items are reviewed in Epic and correct as of today:  Health Maintenance   Topic Date Due     FALL RISK ASSESSMENT  01/16/2019     ANSELMO QUESTIONNAIRE 1 YEAR  03/06/2019     PHQ-9 Q1YR  03/06/2019     ZOSTER IMMUNIZATION (2 of 2) 03/14/2019     COLONOSCOPY Q1 YR  05/16/2019     LIPID MONITORING Q1 YEAR  05/22/2019     MICROALBUMIN Q1 YEAR  05/22/2019     PSA Q1 YR  05/22/2019     BMP Q1 YR  12/24/2019     WELLNESS VISIT Q1 YR  01/17/2020     DTAP/TDAP/TD IMMUNIZATION (3 - Td)  "01/12/2022     ADVANCE DIRECTIVE PLANNING Q5 YRS  01/16/2023     AORTIC ANEURYSM SCREENING (SYSTEM ASSIGNED)  Completed     HEPATITIS C SCREENING  Completed     INFLUENZA VACCINE  Addressed     IPV IMMUNIZATION  Aged Out     MENINGITIS IMMUNIZATION  Aged Out     Labs reviewed in EPIC    ROS:  Constitutional, HEENT, cardiovascular, pulmonary, GI, , musculoskeletal, neuro, skin, endocrine and psych systems are negative, except as otherwise noted.    This document serves as a record of the services and decisions personally performed and made by Nash Walker MD. It was created on his behalf by Sherman Tomas, a trained medical scribe. The creation of this document is based the provider's statements to the medical scribe.  Scribe Sherman Tomas 8:21 AM, January 17, 2019    OBJECTIVE:   /80   Pulse 83   Temp 98.6  F (37  C) (Oral)   Ht 1.727 m (5' 8\")   Wt 79.4 kg (175 lb)   SpO2 94%   BMI 26.61 kg/m   Estimated body mass index is 26.61 kg/m  as calculated from the following:    Height as of this encounter: 1.727 m (5' 8\").    Weight as of this encounter: 79.4 kg (175 lb).  EXAM:   GENERAL: healthy, alert, well nourished, well hydrated, no distress  EYES: Eyes grossly normal to inspection, extraocular movements - intact, and PERRL  HENT: ear canals- normal; TMs- normal; Nose- normal; Mouth- no ulcers, no lesions  NECK: no tenderness, minor adenopathy, no asymmetry, no masses, no stiffness; thyroid- normal to palpation  RESP: lungs clear to auscultation - no rales, no rhonchi, no wheezes  CV: regular rates and rhythm, normal S1 S2, no S3 or S4 and no murmur, no click or rub -  ABDOMEN: soft, no tenderness, no  hepatosplenomegaly, no masses, normal bowel sounds  MS: extremities- no gross deformities noted, no edema  SKIN: few pink papules across the upper chest, otherwise no suspicious lesions, no rashes  BACK: no CVA tenderness, no paralumbar tenderness  - male: testicles- normal, no atrophy, no masses;  no inguinal " hernias  RECTAL- male: no masses, no hemorhoids, Prostate- symmetric, no  nodularity, no masses, no hypertrophy  PSYCH: Alert and oriented times 3; speech- coherent , normal rate and volume; able to articulate logical thoughts, able to abstract reason, no tangential thoughts, no hallucinations or delusions, affect- normal  LYMPHATICS: ant. cervical- normal, post. cervical- normal, axillary- normal, supraclavicular- normal, inguinal- normal  NEURO: strength and tone- normal, sensory exam- grossly normal, mentation- intact speech- normal, reflexes- symmetric    Results for orders placed or performed in visit on 01/17/19   Basic metabolic panel   Result Value Ref Range    Sodium 135 133 - 144 mmol/L    Potassium 3.9 3.4 - 5.3 mmol/L    Chloride 103 94 - 109 mmol/L    Carbon Dioxide 27 20 - 32 mmol/L    Anion Gap 5 3 - 14 mmol/L    Glucose 106 (H) 70 - 99 mg/dL    Urea Nitrogen 13 7 - 30 mg/dL    Creatinine 1.08 0.66 - 1.25 mg/dL    GFR Estimate 68 >60 mL/min/[1.73_m2]    GFR Estimate If Black 79 >60 mL/min/[1.73_m2]    Calcium 9.3 8.5 - 10.1 mg/dL   CBC with platelets   Result Value Ref Range    WBC 6.5 4.0 - 11.0 10e9/L    RBC Count 5.50 4.4 - 5.9 10e12/L    Hemoglobin 17.6 13.3 - 17.7 g/dL    Hematocrit 50.6 40.0 - 53.0 %    MCV 92 78 - 100 fl    MCH 32.0 26.5 - 33.0 pg    MCHC 34.8 31.5 - 36.5 g/dL    RDW 12.3 10.0 - 15.0 %    Platelet Count 203 150 - 450 10e9/L   Lipid panel reflex to direct LDL Fasting   Result Value Ref Range    Cholesterol 170 <200 mg/dL    Triglycerides 63 <150 mg/dL    HDL Cholesterol 69 >39 mg/dL    LDL Cholesterol Calculated 88 <100 mg/dL    Non HDL Cholesterol 101 <130 mg/dL   PSA, tumor marker   Result Value Ref Range    PSA 0.02 0 - 4 ug/L   Albumin Random Urine Quantitative with Creat Ratio   Result Value Ref Range    Creatinine Urine PENDING mg/dL    Albumin Urine mg/L 52 mg/L    Albumin Urine mg/g Cr PENDING 0 - 17 mg/g Cr      ASSESSMENT / PLAN:   Steven was seen today for wellness  "visit.    Diagnoses and all orders for this visit:    Medicare annual wellness visit, subsequent    Hyperlipidemia LDL goal <130 - Well controlled, continue medication  -     atorvastatin (LIPITOR) 10 MG tablet; Take 0.5 tablets (5 mg) by mouth daily  -     Lipid panel reflex to direct LDL Fasting    Hypertension goal BP (blood pressure) < 130/80 - Controlled, continue medication  -     losartan (COZAAR) 50 MG tablet; Take 1 tablet (50 mg) by mouth daily  -     potassium chloride ER (KLOR-CON) 20 MEQ CR tablet; Take 1 tablet (20 mEq) by mouth daily  -     Basic metabolic panel  -     Albumin Random Urine Quantitative with Creat Ratio    Seasonal allergic rhinitis, unspecified trigger - Stable, no concerns, continue medication  -     fluticasone (FLONASE) 50 MCG/ACT nasal spray; Spray 1-2 sprays into both nostrils daily    Prediabetes - diet and exercise    Elevated hemoglobin (H) -h/o - better now.   -     CBC with platelets    h/o prostate cancer (H) - S/p prostatectomy at 54 yo (2001)  -     PSA, tumor marker      End of Life Planning:  Patient currently has an advanced directive: Yes.  Practitioner is supportive of decision.    COUNSELING:  Reviewed preventive health counseling, as reflected in patient instructions       Regular exercise       Healthy diet/nutrition       Vision screening       Hearing screening    BP Readings from Last 1 Encounters:   01/17/19 130/80     Estimated body mass index is 26.61 kg/m  as calculated from the following:    Height as of this encounter: 1.727 m (5' 8\").    Weight as of this encounter: 79.4 kg (175 lb).   reports that  has never smoked. he has never used smokeless tobacco.    Appropriate preventive services were discussed with this patient, including applicable screening as appropriate for cardiovascular disease, diabetes, osteopenia/osteoporosis, and glaucoma.  As appropriate for age/gender, discussed screening for colorectal cancer, prostate cancer, breast cancer, and " cervical cancer. Checklist reviewing preventive services available has been given to the patient.    Reviewed patients plan of care and provided an AVS. The Basic Care Plan (routine screening as documented in Health Maintenance) for Steven meets the Care Plan requirement. This Care Plan has been established and reviewed with the Patient.    Counseling Resources:  ATP IV Guidelines  Pooled Cohorts Equation Calculator  Breast Cancer Risk Calculator  FRAX Risk Assessment  ICSI Preventive Guidelines  Dietary Guidelines for Americans, 2010  [employer] MyPlate  ASA Prophylaxis  Lung CA Screening    The information in this document, created by the medical scribe for me, accurately reflects the services I personally performed and the decisions made by me. I have reviewed and approved this document for accuracy prior to leaving the patient care area.  8:38 AM, 01/17/19    Nash Walker MD  [facility] [place]

## 2019-01-15 NOTE — PATIENT INSTRUCTIONS
Services Typically covered by Medicare Recommended Completed   Vaccines    Pneumonoccol    Influenza    Hepatitis B (if medium/high risk)     Once for patients after age 65    Yearly  Medium/high risk factors:    End Stage Kidney Disease    Hemophiliacs who received Factor XIII or IX concentrates    Clients of institutions for developmentally disabled    Persons who live in same house as a Hepatitis B carrier    Homosexual men    Illicit injectable drug users    Health care workers     Prostate Cancer Screening    Digital rectal exam    PSA Covered: Annually for all men > age 50     Corolrectal Cancer Screening Screening colonoscopy every 10 years, more often for high risk patients     Diabetes Self-Management Training Requires referral by treating physician for patient with diabetes     Diabetes Screening    Fasting blood sugar or glucose tolerance test   Once yearly, twice yearly if prediabetic     Cardiovascular Screening Blood Tests    Total Cholesterol    HDL    Triglycerides Every 5 years     Medical Nutrition Therapy for Diabetes or Renal Disease Requires referral by treating physician for patient with diabetes or kidney disease     Glaucoma Screening Annually for patients with one of the following risk factors:    Diabetes Mellitus    Family history of Glaucoma    -American age 50 and over    -American age 65 and over     Bone Mass Measurement Every 24 months if one of the following risk factors:    Estrogen deficiency    Vertebral abnormalities on x-ray indicative of Osteoporosis, Osteopenia, or Vertebral fracture    Receiving/expected to receive the equivalent of at least 5 mg of Prednisone per day for > 3 months    Hyperparathyroidism    Patient being monitored for response to Osteoporosis Therapy     One-time AAA screen  Must be ordered as part of Medicare IPPE   Any patient with a family history of AAA    Males Age 65-75, with history of smoking at least 100 cigarettes in lifetime      Smoking Cessation Counseling Beneficiaries who use tobacco are eligible to receive 2 cessation attempts per year; each attempt includes maximum of 4 sessions     HIV Screening Annually for beneficiaries at increased risk:       Increased risk for HIV infection is defined in the  National Coverage Determinations (NCD) Manual,  Publication 100-03 Sections 190.14 (diagnostic) and 210.7 (screening). See http://www.cms.gov/manuals/downloads/igt780d7_Dnwp4.pdf and http://www.cms.gov/manuals/downloads/gbg155s5_Hyqa9.pdf on the Internet.  Three times per pregnancy for beneficiaries who are pregnant.     Future Annual Wellness Visit Annually, for all beneficiaries.       Preventive Health Recommendations:     See your health care provider every year to    Review health changes.     Discuss preventive care.      Review your medicines if your doctor has prescribed any.    Talk with your health care provider about whether you should have a test to screen for prostate cancer (PSA).    Every 3 years, have a diabetes test (fasting glucose). If you are at risk for diabetes, you should have this test more often.    Every 5 years, have a cholesterol test. Have this test more often if you are at risk for high cholesterol or heart disease.     Every 10 years, have a colonoscopy. Or, have a yearly FIT test (stool test). These exams will check for colon cancer.    Talk to with your health care provider about screening for Abdominal Aortic Aneurysm if you have a family history of AAA or have a history of smoking.  Shots:     Get a flu shot each year.     Get a tetanus shot every 10 years.     Talk to your doctor about your pneumonia vaccines. There are now two you should receive - Pneumovax (PPSV 23) and Prevnar (PCV 13).    Talk to your pharmacist about a shingles vaccine.     Talk to your doctor about the hepatitis B vaccine.  Nutrition:     Eat at least 5 servings of fruits and vegetables each day.     Eat whole-grain bread,  whole-wheat pasta and brown rice instead of white grains and rice.     Get adequate Calcium and Vitamin D.   Lifestyle    Exercise for at least 150 minutes a week (30 minutes a day, 5 days a week). This will help you control your weight and prevent disease.     Limit alcohol to one drink per day.     No smoking.     Wear sunscreen to prevent skin cancer.     See your dentist every six months for an exam and cleaning.     See your eye doctor every 1 to 2 years to screen for conditions such as glaucoma, macular degeneration and cataracts.    Personalized Prevention Plan  You are due for the preventive services outlined below.  Your care team is available to assist you in scheduling these services.  If you have already completed any of these items, please share that information with your care team to update in your medical record.    Health Maintenance Due   Topic Date Due     Zoster (Chicken Pox) Vaccine (1 of 2) 03/27/1996     FALL RISK ASSESSMENT  01/16/2019     Wellness Visit with your Primary Provider - yearly  01/16/2019

## 2019-01-17 ENCOUNTER — OFFICE VISIT (OUTPATIENT)
Dept: FAMILY MEDICINE | Facility: CLINIC | Age: 74
End: 2019-01-17
Payer: MEDICARE

## 2019-01-17 VITALS
DIASTOLIC BLOOD PRESSURE: 80 MMHG | TEMPERATURE: 98.6 F | SYSTOLIC BLOOD PRESSURE: 130 MMHG | HEART RATE: 83 BPM | HEIGHT: 68 IN | OXYGEN SATURATION: 94 % | BODY MASS INDEX: 26.52 KG/M2 | WEIGHT: 175 LBS

## 2019-01-17 DIAGNOSIS — E78.5 HYPERLIPIDEMIA LDL GOAL <130: ICD-10-CM

## 2019-01-17 DIAGNOSIS — R73.03 PREDIABETES: ICD-10-CM

## 2019-01-17 DIAGNOSIS — C61 PROSTATE CANCER (H): ICD-10-CM

## 2019-01-17 DIAGNOSIS — Z00.00 MEDICARE ANNUAL WELLNESS VISIT, SUBSEQUENT: Primary | ICD-10-CM

## 2019-01-17 DIAGNOSIS — J30.2 SEASONAL ALLERGIC RHINITIS, UNSPECIFIED TRIGGER: ICD-10-CM

## 2019-01-17 DIAGNOSIS — D58.2 ELEVATED HEMOGLOBIN (H): ICD-10-CM

## 2019-01-17 DIAGNOSIS — I10 HYPERTENSION GOAL BP (BLOOD PRESSURE) < 130/80: ICD-10-CM

## 2019-01-17 LAB
ANION GAP SERPL CALCULATED.3IONS-SCNC: 5 MMOL/L (ref 3–14)
BUN SERPL-MCNC: 13 MG/DL (ref 7–30)
CALCIUM SERPL-MCNC: 9.3 MG/DL (ref 8.5–10.1)
CHLORIDE SERPL-SCNC: 103 MMOL/L (ref 94–109)
CHOLEST SERPL-MCNC: 170 MG/DL
CO2 SERPL-SCNC: 27 MMOL/L (ref 20–32)
CREAT SERPL-MCNC: 1.08 MG/DL (ref 0.66–1.25)
CREAT UR-MCNC: 205 MG/DL
ERYTHROCYTE [DISTWIDTH] IN BLOOD BY AUTOMATED COUNT: 12.3 % (ref 10–15)
GFR SERPL CREATININE-BSD FRML MDRD: 68 ML/MIN/{1.73_M2}
GLUCOSE SERPL-MCNC: 106 MG/DL (ref 70–99)
HCT VFR BLD AUTO: 50.6 % (ref 40–53)
HDLC SERPL-MCNC: 69 MG/DL
HGB BLD-MCNC: 17.6 G/DL (ref 13.3–17.7)
LDLC SERPL CALC-MCNC: 88 MG/DL
MCH RBC QN AUTO: 32 PG (ref 26.5–33)
MCHC RBC AUTO-ENTMCNC: 34.8 G/DL (ref 31.5–36.5)
MCV RBC AUTO: 92 FL (ref 78–100)
MICROALBUMIN UR-MCNC: 52 MG/L
MICROALBUMIN/CREAT UR: 25.56 MG/G CR (ref 0–17)
NONHDLC SERPL-MCNC: 101 MG/DL
PLATELET # BLD AUTO: 203 10E9/L (ref 150–450)
POTASSIUM SERPL-SCNC: 3.9 MMOL/L (ref 3.4–5.3)
PSA SERPL-MCNC: 0.02 UG/L (ref 0–4)
RBC # BLD AUTO: 5.5 10E12/L (ref 4.4–5.9)
SODIUM SERPL-SCNC: 135 MMOL/L (ref 133–144)
TRIGL SERPL-MCNC: 63 MG/DL
WBC # BLD AUTO: 6.5 10E9/L (ref 4–11)

## 2019-01-17 PROCEDURE — 80061 LIPID PANEL: CPT | Performed by: FAMILY MEDICINE

## 2019-01-17 PROCEDURE — 80048 BASIC METABOLIC PNL TOTAL CA: CPT | Performed by: FAMILY MEDICINE

## 2019-01-17 PROCEDURE — 82043 UR ALBUMIN QUANTITATIVE: CPT | Performed by: FAMILY MEDICINE

## 2019-01-17 PROCEDURE — 36415 COLL VENOUS BLD VENIPUNCTURE: CPT | Performed by: FAMILY MEDICINE

## 2019-01-17 PROCEDURE — 84153 ASSAY OF PSA TOTAL: CPT | Performed by: FAMILY MEDICINE

## 2019-01-17 PROCEDURE — G0439 PPPS, SUBSEQ VISIT: HCPCS | Performed by: FAMILY MEDICINE

## 2019-01-17 PROCEDURE — 85027 COMPLETE CBC AUTOMATED: CPT | Performed by: FAMILY MEDICINE

## 2019-01-17 RX ORDER — FLUTICASONE PROPIONATE 50 MCG
1-2 SPRAY, SUSPENSION (ML) NASAL DAILY
Qty: 48 G | Refills: 3 | Status: SHIPPED | OUTPATIENT
Start: 2019-01-17 | End: 2020-01-28

## 2019-01-17 RX ORDER — POTASSIUM CHLORIDE 1500 MG/1
20 TABLET, EXTENDED RELEASE ORAL DAILY
Qty: 90 TABLET | Refills: 3 | Status: SHIPPED | OUTPATIENT
Start: 2019-01-17 | End: 2020-01-28

## 2019-01-17 RX ORDER — LOSARTAN POTASSIUM 50 MG/1
50 TABLET ORAL DAILY
Qty: 90 TABLET | Refills: 3 | Status: SHIPPED | OUTPATIENT
Start: 2019-01-17 | End: 2019-04-09

## 2019-01-17 RX ORDER — ATORVASTATIN CALCIUM 10 MG/1
5 TABLET, FILM COATED ORAL DAILY
Qty: 90 TABLET | Refills: 3 | Status: SHIPPED | OUTPATIENT
Start: 2019-01-17 | End: 2020-01-28

## 2019-01-17 ASSESSMENT — MIFFLIN-ST. JEOR: SCORE: 1518.29

## 2019-01-18 ENCOUNTER — MYC MEDICAL ADVICE (OUTPATIENT)
Dept: FAMILY MEDICINE | Facility: CLINIC | Age: 74
End: 2019-01-18

## 2019-01-18 NOTE — RESULT ENCOUNTER NOTE
Dear Chivo,    Here is a summary of your recent test results:  -Normal red blood cell (hgb) levels, normal white blood cell count and normal platelet levels.  -PSA (prostate specific antigen) test is not detected.  This indicates a low likelihood of prostate cancer.  ADVISE: rechecking this in 1 year.  -Cholesterol levels are at your goal levels.  ADVISE: continuing your medication, a regular exercise program with at least 150 minutes of aerobic exercise per week, and eating a low saturated fat/low carbohydrate diet.  Also, you should recheck this fasting cholesterol panel in 12 months.  -Kidney function (GFR) is normal.  -Sodium is normal.  -Potassium is normal.  -Calcium is normal.  -Glucose is slight elevated and may be a sign of early diabetes (prediabetes). ADVISE:: eating a low carbohydrate diet, exercising, trying to lose weight (if necessary) and rechecking your glucose level in 12 months.  -Microalbumin (urine protein) level is elevated. This is suggestive of early damage to your kidneys from high blood pressure.  ADVISE: avoiding anti-inflamatory agents such as ibuprofen (Advil, Motrin) or naproxen (Aleve) as much as possible, keeping your blood pressure in a normal range, and continuing your medication (losartan) that helps protect your kidneys.  Also, this should be rechecked in 1 year.     For additional lab test information, labtestsonline.org is an excellent reference.           Thank you very much for trusting me and [facility].     Healthy regards,  Joaquin Walker MD

## 2019-01-18 NOTE — TELEPHONE ENCOUNTER
Forwarded to RL.  Please review patient's Mychart message and advise.  Kely Hooper, RN, BS, PHN

## 2019-01-21 NOTE — ADDENDUM NOTE
Encounter addended by: Sienna Anguiano, PT on: 1/21/2019 12:26 PM   Actions taken: Flowsheet accepted, Charge Capture section accepted

## 2019-04-03 ENCOUNTER — TELEPHONE (OUTPATIENT)
Dept: FAMILY MEDICINE | Facility: CLINIC | Age: 74
End: 2019-04-03

## 2019-04-03 NOTE — TELEPHONE ENCOUNTER
Steven Duong is a 74 year old male who calls with elevated BP. Reports /90, 160/99. States that he has right sided neck stiffness. No other pain. Reports as dull ache 2-3/10. Denies any other pain or radiation of this pain. States that he worked out yesterday in his home gym and also did work in the garage. Denies any other symptoms such as shortness of breath, dizziness, nausea, sweating. Denies headache, weakness, numbness or tingling.  Patient states that his diuretic was stopped in January due to low sodium. He has not checked his BP since that time.   States that his home BP cuff usually runs higher than when he checks it at home.    NURSING ASSESSMENT:  Description:  above  Onset/duration:  Checked BP about 1 hour ago  Precip. factors:  Medication change  Associated symptoms:  Right sided dull neck ache  Improves/worsens symptoms:  Has not treated  Pain scale (0-10)   2/10    Last exam/Treatment:  1/17/19  Allergies:   Allergies   Allergen Reactions     Penicillins      Other reaction(s): Throat Swelling/Closing       MEDICATIONS:   Taking medication(s) as prescribed? Yes  Taking over the counter medication(s?) No  Any medication side effects? No significant side effects    Any barriers to taking medication(s) as prescribed?  No  Medication(s) improving/managing symptoms?  No  Medication reconciliation completed: No      NURSING PLAN: Nursing advice to patient rest and recheck. If worsening BP or other new or worsening symptoms discussed above call 911    RECOMMENDED DISPOSITION:  See in 24 hours - scheduled for 9 am tomorrow morning  Will comply with recommendation: Yes  If further questions/concerns or if symptoms do not improve, worsen or new symptoms develop, call your PCP or [facility] as soon as possible.      Guideline used:  Telephone Triage Protocols for Nurses, Fifth Edition, Hellen Hickman RN

## 2019-04-04 ENCOUNTER — OFFICE VISIT (OUTPATIENT)
Dept: FAMILY MEDICINE | Facility: CLINIC | Age: 74
End: 2019-04-04
Payer: MEDICARE

## 2019-04-04 VITALS
HEART RATE: 80 BPM | BODY MASS INDEX: 26.46 KG/M2 | DIASTOLIC BLOOD PRESSURE: 80 MMHG | WEIGHT: 174 LBS | SYSTOLIC BLOOD PRESSURE: 140 MMHG | TEMPERATURE: 98.2 F

## 2019-04-04 DIAGNOSIS — I10 HYPERTENSION GOAL BP (BLOOD PRESSURE) < 130/80: Primary | ICD-10-CM

## 2019-04-04 DIAGNOSIS — E87.1 HYPONATREMIA: ICD-10-CM

## 2019-04-04 LAB
ANION GAP SERPL CALCULATED.3IONS-SCNC: 7 MMOL/L (ref 3–14)
BUN SERPL-MCNC: 17 MG/DL (ref 7–30)
CALCIUM SERPL-MCNC: 9 MG/DL (ref 8.5–10.1)
CHLORIDE SERPL-SCNC: 104 MMOL/L (ref 94–109)
CO2 SERPL-SCNC: 27 MMOL/L (ref 20–32)
CREAT SERPL-MCNC: 1.01 MG/DL (ref 0.66–1.25)
GFR SERPL CREATININE-BSD FRML MDRD: 73 ML/MIN/{1.73_M2}
GLUCOSE SERPL-MCNC: 120 MG/DL (ref 70–99)
POTASSIUM SERPL-SCNC: 3.9 MMOL/L (ref 3.4–5.3)
SODIUM SERPL-SCNC: 138 MMOL/L (ref 133–144)

## 2019-04-04 PROCEDURE — 99213 OFFICE O/P EST LOW 20 MIN: CPT | Performed by: NURSE PRACTITIONER

## 2019-04-04 PROCEDURE — 80048 BASIC METABOLIC PNL TOTAL CA: CPT | Performed by: NURSE PRACTITIONER

## 2019-04-04 PROCEDURE — 36415 COLL VENOUS BLD VENIPUNCTURE: CPT | Performed by: NURSE PRACTITIONER

## 2019-04-04 ASSESSMENT — ANXIETY QUESTIONNAIRES
5. BEING SO RESTLESS THAT IT IS HARD TO SIT STILL: NOT AT ALL
6. BECOMING EASILY ANNOYED OR IRRITABLE: NOT AT ALL
IF YOU CHECKED OFF ANY PROBLEMS ON THIS QUESTIONNAIRE, HOW DIFFICULT HAVE THESE PROBLEMS MADE IT FOR YOU TO DO YOUR WORK, TAKE CARE OF THINGS AT HOME, OR GET ALONG WITH OTHER PEOPLE: NOT DIFFICULT AT ALL
2. NOT BEING ABLE TO STOP OR CONTROL WORRYING: NOT AT ALL
7. FEELING AFRAID AS IF SOMETHING AWFUL MIGHT HAPPEN: NOT AT ALL
1. FEELING NERVOUS, ANXIOUS, OR ON EDGE: NOT AT ALL
3. WORRYING TOO MUCH ABOUT DIFFERENT THINGS: NOT AT ALL
GAD7 TOTAL SCORE: 0

## 2019-04-04 ASSESSMENT — PATIENT HEALTH QUESTIONNAIRE - PHQ9
5. POOR APPETITE OR OVEREATING: NOT AT ALL
SUM OF ALL RESPONSES TO PHQ QUESTIONS 1-9: 0

## 2019-04-04 NOTE — PROGRESS NOTES
SUBJECTIVE:                                                      Steven Duong is a 74 year old male who presents to clinic today for the following health issues:    Concern - Steven Duong is a 74 year old male who calls with elevated BP. Reports /90, 160/99. States that he has right sided neck stiffness. No other pain. Reports as dull ache 2-3/10. Denies any other pain or radiation of this pain. States that he worked out yesterday in his home gym and also did work in the garage. Denies any other symptoms such as shortness of breath, dizziness, nausea, sweating. Denies headache, weakness, numbness or tingling.  Patient states that his diuretic was stopped in January due to low sodium. He has not checked his BP since that time.   States that his home BP cuff usually runs higher than when he checks it at home.     HPI: Presents today for evaluation of elevated home blood pressure readings.  He reports that their home cuff is quite old, and he has been needing to replace it.  However he does trust, in general, the data that he gets.  For background, he was previously on 50 mg of losartan daily in addition to hydrochlorothiazide.  However, in January he was noted to have hyponatremia, so the diuretic component of this therapy was stopped.  Since then, he notes that his home blood pressures have risen into the 150s - 160s / 80s.  He denies being symptomatic of these high readings.  He is wondering if his therapy needs to be intensified after having removed the diuretic component.    Problem list and histories reviewed & adjusted, as indicated.  Additional history: as documented    Reviewed and updated as needed this visit by clinical staff  Tobacco  Allergies  Meds  Problems  Med Hx  Surg Hx  Fam Hx       Reviewed and updated as needed this visit by Provider  Tobacco  Allergies  Meds  Problems  Med Hx  Surg Hx  Fam Hx         ROS:  Constitutional, HEENT, cardiovascular, pulmonary, neuro  systems are negative, except as otherwise noted.    OBJECTIVE:                                                      /80 (BP Location: Right arm, Patient Position: Chair, Cuff Size: Adult Regular)   Pulse 80   Temp 98.2  F (36.8  C) (Tympanic)   Wt 78.9 kg (174 lb)   BMI 26.46 kg/m   Body mass index is 26.46 kg/m .   GENERAL: healthy, alert, well nourished, well hydrated, no distress  RESP: lungs clear to auscultation - no rales, no rhonchi, no wheezes  CV: regular rates and rhythm, normal S1 S2, no S3 or S4 and no murmur, no click or rub -  NEURO: strength and tone- normal, sensory exam- grossly normal, mentation- intact, speech- normal, reflexes- symmetric    Diagnostic test results:  No results found for this or any previous visit (from the past 24 hour(s)).    ASSESSMENT/PLAN:                                                      Steven was seen today for recheck medication.  Blood pressure in clinic today was 140/80.  This, in concert with his report of elevated home readings, compels us to intensify his therapy today - especially in light of the fact that his diuretic component of his therapy was discontinued in January.  He has quite a few 50 mg losartan tabs at home, so I have asked him to begin taking 2 of these daily and checking his home blood pressure.  Of note I have asked him to  a new blood pressure cuff for home given the age of his current one.  He agrees to do so if his blood pressure responds favorably, he is to reach out and we can order 100 mg tabs at the time of his next due refill.  It is probably also a good idea to recheck his sodium today so I have ordered a BMP.  I will follow-up with him when I get the results of this lab. Discussed reasons to call or return to clinic. Chivo acknowledges and demonstrates understanding of circumstances under which care should be sought urgently or emergently. Follow up as discussed.     Diagnoses and all orders for this  visit:    Hypertension goal BP (blood pressure) < 130/80  Comment: Begin taking 100 mg daily of losartan. Keep log of home BPs on new meter. Let us know how you are responding to this change. Will adjust therapy based on that, if indicated.    Hyponatremia  -     Basic metabolic panel  (Ca, Cl, CO2, Creat, Gluc, K, Na, BUN)        Risks, benefits and alternatives of treatments discussed. Plan agreed upon and all questions answered.      Follow-Up: Return in about 4 weeks (around 5/2/2019) for BP Recheck, Lab Work.    See Patient Instructions      Jovi Dwyer, APRN, CNP

## 2019-04-04 NOTE — PATIENT INSTRUCTIONS
1.  a new BP cuff today.  2. Start taking two tabs of Cozaar (100 mg) and check your BP - if it's low on your new machine, try 75 mg or go back to 50 mg. Keep us posted.  3. I'll let you know about your electrolytes.  4. Reach out with questions or issues.

## 2019-04-05 ASSESSMENT — ANXIETY QUESTIONNAIRES: GAD7 TOTAL SCORE: 0

## 2019-06-04 ENCOUNTER — TELEPHONE (OUTPATIENT)
Dept: FAMILY MEDICINE | Facility: CLINIC | Age: 74
End: 2019-06-04

## 2019-06-04 NOTE — TELEPHONE ENCOUNTER
Non-detailed message left for patient to return call  GABRIELLE FitchN, RN  [employer] [facility]

## 2019-06-04 NOTE — TELEPHONE ENCOUNTER
"Reason for Call:  Other call back    Detailed comments: Pt called this afternoon and would like to speak with a nurse working with Dr. Walker in regards to his finger and toe nails \"growing strangly\". Pt stated that this has never happened before. Please give pt a call back to assess, Thank you.    Phone Number Patient can be reached at: Home number on file [phone] (home)    Best Time:     Can we leave a detailed message on this number? YES    Call taken on 6/4/2019 at 1:35 PM by Yeni Francis      "

## 2019-06-05 ENCOUNTER — OFFICE VISIT (OUTPATIENT)
Dept: FAMILY MEDICINE | Facility: CLINIC | Age: 74
End: 2019-06-05
Payer: MEDICARE

## 2019-06-05 VITALS
WEIGHT: 175 LBS | OXYGEN SATURATION: 100 % | HEART RATE: 85 BPM | TEMPERATURE: 97.9 F | DIASTOLIC BLOOD PRESSURE: 70 MMHG | HEIGHT: 68 IN | SYSTOLIC BLOOD PRESSURE: 132 MMHG | BODY MASS INDEX: 26.52 KG/M2

## 2019-06-05 DIAGNOSIS — L60.9 ABNORMALITY OF NAIL SURFACE: Primary | ICD-10-CM

## 2019-06-05 DIAGNOSIS — B35.1 TOENAIL FUNGUS: ICD-10-CM

## 2019-06-05 LAB
ANION GAP SERPL CALCULATED.3IONS-SCNC: 8 MMOL/L (ref 3–14)
BUN SERPL-MCNC: 19 MG/DL (ref 7–30)
CALCIUM SERPL-MCNC: 9.2 MG/DL (ref 8.5–10.1)
CHLORIDE SERPL-SCNC: 106 MMOL/L (ref 94–109)
CO2 SERPL-SCNC: 26 MMOL/L (ref 20–32)
CREAT SERPL-MCNC: 1.19 MG/DL (ref 0.66–1.25)
GFR SERPL CREATININE-BSD FRML MDRD: 60 ML/MIN/{1.73_M2}
GLUCOSE SERPL-MCNC: 65 MG/DL (ref 70–99)
POTASSIUM SERPL-SCNC: 4.5 MMOL/L (ref 3.4–5.3)
SODIUM SERPL-SCNC: 140 MMOL/L (ref 133–144)

## 2019-06-05 PROCEDURE — 80048 BASIC METABOLIC PNL TOTAL CA: CPT | Performed by: PHYSICIAN ASSISTANT

## 2019-06-05 PROCEDURE — 99213 OFFICE O/P EST LOW 20 MIN: CPT | Performed by: PHYSICIAN ASSISTANT

## 2019-06-05 PROCEDURE — 36415 COLL VENOUS BLD VENIPUNCTURE: CPT | Performed by: PHYSICIAN ASSISTANT

## 2019-06-05 RX ORDER — UBIDECARENONE 100 MG
CAPSULE ORAL DAILY
COMMUNITY

## 2019-06-05 ASSESSMENT — MIFFLIN-ST. JEOR: SCORE: 1513.29

## 2019-06-05 NOTE — PROGRESS NOTES
"  SUBJECTIVE:                                                    Steven Duong is a 74 year old male who presents to clinic today for the following health issues:      Concern - Finger and toe nails  Onset: noticed yesterday    Description:   Finger nails red at tips, indented toward the tip. Toes nails dark yellow in color    Intensity: mild, moderate    Progression of Symptoms:  same    Accompanying Signs & Symptoms:  nothing    Previous history of similar problem:      Not for fingers --- toe nails on Right have started to look not as healthy    Precipitating factors:   Worsened by: n/a    Alleviating factors:  Improved by: n/a    Therapies Tried and outcome: OTC fungus drops, cream - no relief - for toes    Problem list and histories reviewed & adjusted, as indicated.  Additional history: as documented      ROS:  Constitutional, HEENT, cardiovascular, pulmonary, GI, , musculoskeletal, neuro, skin, endocrine and psych systems are negative, except as otherwise noted.    OBJECTIVE:                                                    /70 (BP Location: Left arm, Patient Position: Chair, Cuff Size: Adult Regular)   Pulse 85   Temp 97.9  F (36.6  C) (Oral)   Ht 1.727 m (5' 8\")   Wt 79.4 kg (175 lb)   SpO2 100%   BMI 26.61 kg/m    Body mass index is 26.61 kg/m .  GENERAL: healthy, alert and no distress  EYES: Eyes grossly normal to inspection, PERRL and conjunctivae and sclerae normal  MS: no gross musculoskeletal defects noted, no edema  SKIN: no suspicious lesions or rashes, NAILS - Divots in finger nails on all fingers at distal end, toes with mild appearing fungus.    NEURO: Normal strength and tone, mentation intact and speech normal  PSYCH: mentation appears normal, affect normal/bright    Diagnostic Test Results:  none      ASSESSMENT/PLAN:                                                      Steven was seen today for derm problem.    Diagnoses and all orders for this visit:    Abnormality of " nail surface  -     Basic metabolic panel  (Ca, Cl, CO2, Creat, Gluc, K, Na, BUN)    Toenail fungus      Patient advised to followup with dermatology/skin care.  He declined referral today as he has a dermatologist he will see soon.  Patient also declined toenail testing for fungus and states that he will continue over the counter treatment for now.  Discussed oral drugs for toenail fungus, but patient is not interested at this time.       Followup: Return in about 1 month (around 7/3/2019) for Specialty followup, please be seen sooner if needed.    -- I have discussed the patient's diagnosis, and my plan of treatment with the patient and/or family. Patient is aware to followup if symptoms do not improve.  Patient has been advised to be seen sooner or seek more immediate care if symptoms change or worsen.  Patient agrees with and understands the plan today.     See Patient Instructions        Jennifer Andre PA-C    [facility] [place]

## 2019-06-05 NOTE — PATIENT INSTRUCTIONS
Please followup with dermatology regarding the nails, especially sooner if things change or worsen in any way.

## 2019-07-25 ENCOUNTER — TRANSFERRED RECORDS (OUTPATIENT)
Dept: HEALTH INFORMATION MANAGEMENT | Facility: CLINIC | Age: 74
End: 2019-07-25

## 2019-08-19 ENCOUNTER — OFFICE VISIT (OUTPATIENT)
Dept: FAMILY MEDICINE | Facility: CLINIC | Age: 74
End: 2019-08-19
Payer: MEDICARE

## 2019-08-19 ENCOUNTER — TELEPHONE (OUTPATIENT)
Dept: FAMILY MEDICINE | Facility: CLINIC | Age: 74
End: 2019-08-19

## 2019-08-19 ENCOUNTER — ANCILLARY PROCEDURE (OUTPATIENT)
Dept: GENERAL RADIOLOGY | Facility: CLINIC | Age: 74
End: 2019-08-19
Attending: FAMILY MEDICINE
Payer: MEDICARE

## 2019-08-19 VITALS
DIASTOLIC BLOOD PRESSURE: 76 MMHG | HEIGHT: 68 IN | BODY MASS INDEX: 26.67 KG/M2 | OXYGEN SATURATION: 99 % | TEMPERATURE: 97.6 F | WEIGHT: 176 LBS | SYSTOLIC BLOOD PRESSURE: 139 MMHG | HEART RATE: 73 BPM

## 2019-08-19 DIAGNOSIS — T18.9XXA SWALLOWED FOREIGN BODY: ICD-10-CM

## 2019-08-19 DIAGNOSIS — T18.9XXA SWALLOWED FOREIGN BODY, INITIAL ENCOUNTER: Primary | ICD-10-CM

## 2019-08-19 DIAGNOSIS — T18.9XXA SWALLOWED FOREIGN BODY: Primary | ICD-10-CM

## 2019-08-19 DIAGNOSIS — B35.1 ONYCHOMYCOSIS: ICD-10-CM

## 2019-08-19 PROCEDURE — 71046 X-RAY EXAM CHEST 2 VIEWS: CPT | Mod: FY

## 2019-08-19 PROCEDURE — 99213 OFFICE O/P EST LOW 20 MIN: CPT | Performed by: FAMILY MEDICINE

## 2019-08-19 RX ORDER — CICLOPIROX 80 MG/ML
SOLUTION TOPICAL
Refills: 4 | COMMUNITY
Start: 2019-07-23 | End: 2020-01-28

## 2019-08-19 RX ORDER — TERBINAFINE HYDROCHLORIDE 250 MG/1
250 TABLET ORAL DAILY
Qty: 28 TABLET | Refills: 0 | Status: SHIPPED | OUTPATIENT
Start: 2019-08-19 | End: 2020-01-28

## 2019-08-19 ASSESSMENT — MIFFLIN-ST. JEOR: SCORE: 1517.83

## 2019-08-19 NOTE — TELEPHONE ENCOUNTER
Mary, Dental Assistant with [facility] in [place], [facility]    Stated patient swallowed a small hand wrench (about the size of a small gumball) that they use for implants - they would like patient seen TODAY to make sure he swallowed the tool and didn't aspirate - requesting Chest X-Ray.     OV scheduled for 1140am with MD MENDOZA    Advised patient that if new or worsening symptoms appear (reviewed new & worsening symptoms) to call the clinic or be seen in the the ER  Patient stated an understanding and agreed with plan.    Viv Coleman RN  [facility]

## 2019-08-19 NOTE — TELEPHONE ENCOUNTER
No X-Ray in [facility] today - patient can have X-Ray done in [facility] then come to [facility] to see MD MENDOZA.  Order futured.   X-Ray scheduled for 1130 in .     Patient informed    Viv Coleman RN  [facility]

## 2019-08-19 NOTE — PROGRESS NOTES
"Subjective     Steven Duong is a 74 year old male who presents to clinic today for the following health issues:    HPI   Foreign Body Ingestion  Per the patient, the patient was getting a dental procedure between 9 AM and 1030 AM this morning when the dentist dropped a small round hand wrench the size of a gumball and he swallowed it. Due to Novocain use, the patient didn't notice that he had swallowed it initially but he did feel it go down into his stomach . The dentist was concerned for an aspiration risk and thus elected to have him get a chest X ray performed which was completed prior to his appointment today. He does not report any breathing difficulties, abdominal pain, nausea or vomiting.     Onchomycosis  The patient is being seen by dermatology for a fungal toenail infection and he states he has been applying ciclopirox 8% to all toenails every night for treatment since late July. He has not noticed any improvement in this and he has also begun to develop a rash on the frontomedial sides of both feet bilaterally.  Symptoms on all toes.     Reviewed and updated as needed this visit by provider:  Tobacco  Allergies  Meds  Problems  Med Hx  Surg Hx  Fam Hx         Review of Systems   Constitutional, HEENT, cardiovascular, pulmonary, GI, , musculoskeletal, neuro, skin, endocrine and psych systems are negative, except as otherwise noted.          Objective     /76 (BP Location: Right arm, Patient Position: Chair, Cuff Size: Adult Large)   Pulse 73   Temp 97.6  F (36.4  C) (Oral)   Ht 1.727 m (5' 8\")   Wt 79.8 kg (176 lb)   SpO2 99%   BMI 26.76 kg/m   Body mass index is 26.76 kg/m .  Physical Exam   GENERAL: healthy, alert, well nourished, well hydrated, no distress  HENT: ear canals- normal; TMs- normal; Nose- normal; Mouth- no ulcers, no lesions  NECK: no tenderness, no adenopathy, no asymmetry, no masses, no stiffness; thyroid- normal to palpation  RESP: lungs clear to auscultation - " "no rales, no rhonchi, no wheezes  CV: regular rates and rhythm, normal S1 S2, no S3 or S4 and no murmur, no click or rub -  ABDOMEN: soft, no tenderness, no  hepatosplenomegaly, no masses, normal bowel sounds  FOOT: Brown discoloration of all nails bilaterally. Annular rash present medially on the soles of both feet.   Skin - onycholysis on multiple nails.     Diagnostic Test Results:   CXR: Lungs are clear. No signs of foreign body.           Assessment & Plan     Steven was seen today for swallowed foreign body.    Diagnoses and all orders for this visit:    Swallowed foreign body, initial encounter - The patient is asymptomatic and lungs are clear of foreign body on X ray. Likely the foreign body has been ingested to the GI system and the patient can process this normally. He has no current signs of GI disturbance of abdominal pain. Offered the patient an abdominal Xray to find the foreign body, patient declined and will proceed with watchful waiting. The patient was given instructions to carefully monitor symptoms and report to the emergency department if onset of worsening abdominal pain. As the patient is feeling fine currently and the tool was not seen on X ray the patient is okay to discharge home for monitoring.   -     Cancel: XR Chest 2 Views; Future    Onychomycosis - Has not resolved after one month course of Penlac, and patient is noting a rash on bilateral feet that most resembles tinea pedis. Due to resistance of fungal infection to Penlac and progression to tinea pedis, prescription sent for course of Lamisil to resolve this. The patient is okay to begin this treatment as liver enzymes are within normal limits.   -     terbinafine (LAMISIL) 250 MG tablet; Take 1 tablet (250 mg) by mouth daily for 7 days and repeat monthly for 4 cycles      BMI:   Estimated body mass index is 26.61 kg/m  as calculated from the following:    Height as of 6/5/19: 1.727 m (5' 8\").    Weight as of 6/5/19: 79.4 kg (175 " orquidea).       See Patient Instructions    Return in about 1 week (around 8/26/2019), or if symptoms worsen or fail to improve, for Wellness Exam, recheck.      Tarun Blanc, MS3,  has participated in the care of this patient.     Provider Disclosure:  I agree with above History, Review of Systems, Physical exam and Plan.  I have reviewed the content of the documentation and have edited it as needed. I have personally performed the services documented here and the documentation accurately represents those services and the decisions I have made.      Electronically signed by:          Joaquin Walker M.D.

## 2019-08-22 ENCOUNTER — OFFICE VISIT (OUTPATIENT)
Dept: FAMILY MEDICINE | Facility: CLINIC | Age: 74
End: 2019-08-22
Payer: MEDICARE

## 2019-08-22 ENCOUNTER — ANCILLARY PROCEDURE (OUTPATIENT)
Dept: GENERAL RADIOLOGY | Facility: CLINIC | Age: 74
End: 2019-08-22
Attending: FAMILY MEDICINE
Payer: MEDICARE

## 2019-08-22 DIAGNOSIS — T18.9XXD SWALLOWED FOREIGN BODY, SUBSEQUENT ENCOUNTER: Primary | ICD-10-CM

## 2019-08-22 DIAGNOSIS — T18.9XXA SWALLOWED FOREIGN BODY, INITIAL ENCOUNTER: ICD-10-CM

## 2019-08-22 PROCEDURE — 99213 OFFICE O/P EST LOW 20 MIN: CPT | Performed by: FAMILY MEDICINE

## 2019-08-22 PROCEDURE — 74019 RADEX ABDOMEN 2 VIEWS: CPT | Mod: FY

## 2019-08-22 RX ORDER — POLYETHYLENE GLYCOL 3350 17 G/17G
1 POWDER, FOR SOLUTION ORAL DAILY PRN
COMMUNITY
Start: 2019-08-22 | End: 2020-01-28

## 2019-08-22 NOTE — PROGRESS NOTES
Subjective     Steven Duong is a 74 year old male who presents to clinic today for the following health issues:    HPI   The patient swallowed a dental instrument on 8/19/19 and was seen in clinic at that time with no symptoms. He had a chest X ray performed on that day that did not reveal any foreign body presence, and it was presumed that he would pass the dental instrument. Since his last visit, he has had normal bowel movements and has not noticed presence of the dental tool. He has had some mild abdominal pain from feeling increasingly bloated in the last few days. He is also currently on azithromycin 250 mg following his dental procedure. He presents requesting abdominal X ray to see if the foreign body is still present in his GI tract.     Reviewed and updated as needed this visit by provider:  Tobacco  Allergies  Meds  Problems  Med Hx  Surg Hx  Fam Hx         Review of Systems   Constitutional, HEENT, cardiovascular, pulmonary, GI, , musculoskeletal, neuro, skin, endocrine and psych systems are negative, except as otherwise noted.          Objective     There were no vitals taken for this visit. There is no height or weight on file to calculate BMI.  Physical Exam   GENERAL: healthy, alert, well nourished, well hydrated, no distress  HENT: ear canals- normal; TMs- normal; Nose- normal; Mouth- no ulcers, no lesions  NECK: no tenderness, no adenopathy, no asymmetry, no masses, no stiffness; thyroid- normal to palpation  RESP: lungs clear to auscultation - no rales, no rhonchi, no wheezes  CV: regular rates and rhythm, normal S1 S2, no S3 or S4 and no murmur, no click or rub -  ABDOMEN: soft, no tenderness, no  hepatosplenomegaly, no masses, normal bowel sounds    Diagnostic Test Results  Xray - 30 mm by 10 mm radioopaque foreign body located in the left upper quadrant at splenic flexure. 10mm of foreign body narrows to 2mm, narrowed region directed medially. Moderate stool in colon. No free air  "under the diaphragm.           Assessment & Plan     Steven was seen today for foreign body.    Diagnoses and all orders for this visit:    Swallowed foreign body, subsequent encounter - the patient has no current signs of bowel obstruction or perforation. Lack of free air under diaphragm, severe abdominal pain or signs of sepsis is reassuring. Foreign body was identified on X ray at the splenic flexure. As there was significant stool presence in the colon and the patient has been noticing increased fullness and bloating, recommended miralax to help move stool along and hopefully pass the foreign body. The patient will take this and carefully monitor symptoms and stool for passage of the dental tool. Informed the patient on symptoms that would prompt visit to the emergency department including sudden onset of severe abdominal pain and fever.   -     polyethylene glycol (MIRALAX/GLYCOLAX) powder; Take 17 g (1 capful) by mouth daily as needed for constipation    Other orders  -     XR Abdomen 2 Views; Future     BMI:   Estimated body mass index is 26.76 kg/m  as calculated from the following:    Height as of 8/19/19: 1.727 m (5' 8\").    Weight as of 8/19/19: 79.8 kg (176 lb).       See Patient Instructions    Return in about 1 week (around 8/29/2019), or if symptoms worsen or fail to improve, for recheck.      Tarun Blanc, MS3,  has participated in the care of this patient.     Provider Disclosure:  I agree with above History, Review of Systems, Physical exam and Plan.  I have reviewed the content of the documentation and have edited it as needed. I have personally performed the services documented here and the documentation accurately represents those services and the decisions I have made.      Electronically signed by:          Joaquin Walker M.D.      "

## 2019-08-30 ENCOUNTER — ANCILLARY PROCEDURE (OUTPATIENT)
Dept: GENERAL RADIOLOGY | Facility: CLINIC | Age: 74
End: 2019-08-30
Attending: FAMILY MEDICINE
Payer: MEDICARE

## 2019-08-30 DIAGNOSIS — T18.9XXD SWALLOWED FOREIGN BODY, SUBSEQUENT ENCOUNTER: ICD-10-CM

## 2019-08-30 PROCEDURE — 74019 RADEX ABDOMEN 2 VIEWS: CPT | Mod: FY

## 2019-08-30 NOTE — RESULT ENCOUNTER NOTE
Dear Chivo,    Here is a summary of your recent test results:  - good news - you passed the tool!           Thank you very much for trusting me and [facility].     Healthy regards,  Joaquin Walker MD

## 2019-11-08 ENCOUNTER — HEALTH MAINTENANCE LETTER (OUTPATIENT)
Age: 74
End: 2019-11-08

## 2019-11-15 ENCOUNTER — OFFICE VISIT (OUTPATIENT)
Dept: FAMILY MEDICINE | Facility: CLINIC | Age: 74
End: 2019-11-15
Payer: MEDICARE

## 2019-11-15 VITALS
TEMPERATURE: 98.4 F | HEART RATE: 74 BPM | BODY MASS INDEX: 27.28 KG/M2 | DIASTOLIC BLOOD PRESSURE: 78 MMHG | OXYGEN SATURATION: 96 % | HEIGHT: 68 IN | SYSTOLIC BLOOD PRESSURE: 136 MMHG | WEIGHT: 180 LBS

## 2019-11-15 DIAGNOSIS — N45.1 EPIDIDYMITIS: Primary | ICD-10-CM

## 2019-11-15 PROCEDURE — 99213 OFFICE O/P EST LOW 20 MIN: CPT | Performed by: FAMILY MEDICINE

## 2019-11-15 RX ORDER — LEVOFLOXACIN 750 MG/1
750 TABLET, FILM COATED ORAL DAILY
Qty: 10 TABLET | Refills: 0 | Status: SHIPPED | OUTPATIENT
Start: 2019-11-15 | End: 2019-12-14

## 2019-11-15 ASSESSMENT — MIFFLIN-ST. JEOR: SCORE: 1535.97

## 2019-11-15 NOTE — PROGRESS NOTES
"Subjective   Steven Duong is a 74 year old male who presents to clinic today for the following health issues:    HPI   Concern - Testicle pain  Onset: 1 week    Description:   Right side scrotum    Intensity: moderate    Progression of Symptoms:  Worse yesterday better today    Accompanying Signs & Symptoms:  none    Previous history of similar problem:   no    Precipitating factors:   Worsened by: bending down    Alleviating factors:  Improved by: none    Therapies Tried and outcome: tylenol    Reviewed and updated as needed this visit by provider:  Tobacco  Allergies  Meds  Problems  Med Hx  Surg Hx  Fam Hx       He declines the flu shot today.    Review of Systems   Constitutional, HEENT, cardiovascular, pulmonary, GI, , musculoskeletal, neuro, skin, endocrine and psych systems are negative, except as otherwise noted.    This document serves as a record of the services and decisions personally performed and made by Nash Walker MD. It was created on his behalf by Azeem Alonso, a trained medical scribe. The creation of this document is based the provider's statements to the medical scribe.  Scribe Azeem Alonso 4:53 PM, November 15, 2019    Objective   /78   Pulse 74   Temp 98.4  F (36.9  C) (Oral)   Ht 1.727 m (5' 8\")   Wt 81.6 kg (180 lb)   SpO2 96%   BMI 27.37 kg/m   Body mass index is 27.37 kg/m .  Physical Exam   GENERAL: healthy, alert, well nourished, well hydrated, no distress  EYES: Eyes grossly normal to inspection, extraocular movements - intact, and PERRL  NEURO: strength and tone- normal, sensory exam- grossly normal, mentation- intact, speech- normal, reflexes- symmetric  PSYCH: Alert and oriented times 3; speech- coherent , normal rate and volume; able to articulate logical thoughts, able to abstract reason, no tangential thoughts, no hallucinations or delusions, affect- normal  - male: right epididymus tender, slight edema otherwise, testicles- normal, no atrophy, no " "masses;  no inguinal hernias      Assessment & Plan   Steven was seen today for groin pain.    Diagnoses and all orders for this visit:    Epididymitis - start.   -     levofloxacin (LEVAQUIN) 750 MG tablet; Take 1 tablet (750 mg) by mouth daily for 10 days    BMI:   Estimated body mass index is 26.76 kg/m  as calculated from the following:    Height as of 8/19/19: 1.727 m (5' 8\").    Weight as of 8/19/19: 79.8 kg (176 lb).     See Patient Instructions    Return in about 1 week (around 11/22/2019), or if symptoms worsen or fail to improve, for recheck.    The information in this document, created by the medical scribe for me, accurately reflects the services I personally performed and the decisions made by me. I have reviewed and approved this document for accuracy prior to leaving the patient care area.  4:59 PM, 11/15/19        Joaquin Walker MD      [address] [address]  [email or web address]   Office: [phone]  Pager: [phone]         "

## 2019-12-12 ENCOUNTER — TELEPHONE (OUTPATIENT)
Dept: FAMILY MEDICINE | Facility: CLINIC | Age: 74
End: 2019-12-12

## 2019-12-12 NOTE — TELEPHONE ENCOUNTER
Pt calling in with reports of Right testicle sore to touch and painful.     Pt has been seen for this recently(11/15/19).Given antibiotics (Levaquin) went away and is now back.    Pt reports the pain presented again 5 days ago and has increased in intensity since beginning.    Pt wondering if need for another office visit or need for another antibiotic.  Routing to PCP to review and advise.      If medication prescribed Fill at  pharmacy.    Garrett Garcia RN   [facility]

## 2019-12-14 ENCOUNTER — OFFICE VISIT (OUTPATIENT)
Dept: FAMILY MEDICINE | Facility: CLINIC | Age: 74
End: 2019-12-14
Payer: MEDICARE

## 2019-12-14 VITALS
WEIGHT: 182 LBS | SYSTOLIC BLOOD PRESSURE: 112 MMHG | HEART RATE: 83 BPM | TEMPERATURE: 97.5 F | HEIGHT: 68 IN | BODY MASS INDEX: 27.58 KG/M2 | DIASTOLIC BLOOD PRESSURE: 66 MMHG | OXYGEN SATURATION: 99 %

## 2019-12-14 DIAGNOSIS — N45.1 EPIDIDYMITIS: ICD-10-CM

## 2019-12-14 PROCEDURE — 99213 OFFICE O/P EST LOW 20 MIN: CPT | Performed by: FAMILY MEDICINE

## 2019-12-14 RX ORDER — LEVOFLOXACIN 750 MG/1
750 TABLET, FILM COATED ORAL DAILY
Qty: 10 TABLET | Refills: 0 | Status: SHIPPED | OUTPATIENT
Start: 2019-12-14 | End: 2019-12-30

## 2019-12-14 ASSESSMENT — MIFFLIN-ST. JEOR: SCORE: 1545.05

## 2019-12-14 NOTE — PATIENT INSTRUCTIONS
Patient Education     Epididymitis  Inflammation of the epididymis can cause pain and swelling in your scrotum. The epididymis is a small tube next to the testicle that stores sperm. Epididymitis is usually caused by an infection. In sexually active men, it is often caused by a sexually transmitted disease (STD) such as chlamydia or gonorrhea. In boys and in men over 40, it can be from bacteria from other parts of the urinary tract (not an STD infection).  Symptoms may begin with pain in the lower belly (abdomen) or low back. The pain then spreads down into the scrotum. Usually only one side is affected. The testicle and scrotum swell and become very painful and red. You may have fever and a burning when passing urine. Sometimes you may have a discharge from the penis.  Treatment is with antibiotics, and anti-inflammatory and pain medicines. The condition should get better over the first few days of treatment. But it will take several weeks for all the swelling and discomfort to go away. If your healthcare provider suspects that an STD is the cause, your sexual partners may need to be treated.  Home care  The following will help you care for yourself at home:    Support the scrotum. When lying down, place a rolled towel under the scrotum. When walking, use an athletic supporter or 2 pairs of jockey-style underwear.    To relieve pain, put ice packs on the inflamed area. You can make your own ice pack by putting ice cubes in a sealed plastic bag wrapped in a thin towel.    You may use over-the-counter medicines to control pain, unless another medicine was given. If you have chronic liver or kidney disease, talk with your healthcare provider before taking these medicines. Also talk with your provider if you've ever had a stomach ulcer or GI bleeding.    Rest in bed for the first few days until the fever, pain, and swelling get better. It may take several weeks for all of the swelling to go away.    Constipation can  make you strain. This makes the pain worse. Avoid constipation by eating natural laxatives such as prunes, fresh fruits, and whole-grain cereals. If necessary, use a mild over-the-counter laxative for constipation. Mineral oil can be used to keep the stools soft.    Do not have sex until you have finished all treatment and all symptoms have cleared.    Take all medicine as directed. Do not miss any doses and do not stop early even if you feel better.  Follow-up care  Follow up with your healthcare provider, or as advised, to be sure you are responding properly to treatment. If a culture was taken, you may call for the result as directed. A culture test can ensure that you are on tWhen to seek medical advicehe correct antibiotic.     Call your healthcare provider right away if any of these occur:    Fever of 100.4 F (38 C), or as directed by your healthcare provider    Increasing pain or swelling of the testicle after starting treatment    Pressure or pain in your bladder that gets worse    Unable to pass urine for 8 hours  Date Last Reviewed: 9/1/2016 2000-2018 [employer]. [address] [place] [address], [place] [address]. All rights reserved. This information is not intended as a substitute for professional medical care. Always follow your healthcare professional's instructions.

## 2019-12-14 NOTE — PROGRESS NOTES
Subjective     Steven Duong is a 74 year old male who presents to clinic today for the following health issues:    HPI     Right testicle sore to touch and painful. He was recently seen for the same symptoms on 11/15 and was treated for epididymitis with Levaquin. Symptoms resolved quickly. Pain restarted about 5 days ago and continued to increase in intensity over a few days but has been slightly better over the past couple days. Denies any urinary symptoms - no dysuria, hematuria, urgency, frequency. No urethral discharge. Denies any fevers, chills, nausea, or vomiting. No radiation of pain.      Patient Active Problem List   Diagnosis     Meniere's disease     Seasonal allergic rhinitis     Family history of coronary artery disease     Hyperlipidemia LDL goal <130     Hypertension goal BP (blood pressure) < 130/80     Advanced directives, counseling/discussion     h/o prostate cancer (H) - S/p prostatectomy at 56 yo (2001)     Prediabetes     Chronic right-sided low back pain with right-sided sciatica     Adenomatous polyp of ascending colon     Onychomycosis     Swallowed foreign body, subsequent encounter     Past Surgical History:   Procedure Laterality Date     SUPRAPUBIC PROSTATECTOMY  2001     TONSILLECTOMY  1953       Social History     Tobacco Use     Smoking status: Never Smoker     Smokeless tobacco: Never Used   Substance Use Topics     Alcohol use: Yes     Alcohol/week: 11.7 standard drinks     Comment: 1-2 beers nightly     Family History   Problem Relation Age of Onset     Coronary Artery Disease Paternal Grandfather      Cerebrovascular Disease Mother         with associated parkinsons     Coronary Artery Disease Father 64        bypass x 2 (initial at 65 yo)     Prostate Cancer Father 70     No Known Problems Sister      Prostate Cancer Brother      Brain Cancer Son 13     Breast Cancer Daughter      Liver Cancer Daughter      Melanoma Daughter      No Known Problems Sister      No Known  "Problems Sister      Prostate Cancer Brother      Colon Cancer No family hx of            Reviewed and updated as needed this visit by Provider  Tobacco  Allergies  Meds  Problems  Med Hx  Surg Hx  Fam Hx         Review of Systems   ROS COMP: Constitutional, HEENT, cardiovascular, pulmonary, gi and gu systems are negative, except as otherwise noted.      Objective    /66 (BP Location: Right arm, Patient Position: Sitting, Cuff Size: Adult Regular)   Pulse 83   Temp 97.5  F (36.4  C) (Oral)   Ht 1.727 m (5' 8\")   Wt 82.6 kg (182 lb)   SpO2 99%   BMI 27.67 kg/m    Body mass index is 27.67 kg/m .  Physical Exam   GENERAL: healthy, alert and no distress  RESP: lungs clear to auscultation - no rales, rhonchi or wheezes  CV: regular rate and rhythm, normal S1 S2, no S3 or S4, no murmur, click or rub, no peripheral edema and peripheral pulses strong  ABDOMEN: soft, nontender, no hepatosplenomegaly, no masses and bowel sounds normal   (male): penis normal without urethral discharge and right epididymis tender and swollen  MS: no gross musculoskeletal defects noted, no edema    Diagnostic Test Results:  none         Assessment & Plan     1. Epididymitis: symptoms consistent with recurrence of epididymitis. If not improving over the next 72 hours or if recurs again, would get ultrasound and urology consult for further evaluation.  - levofloxacin (LEVAQUIN) 750 MG tablet; Take 1 tablet (750 mg) by mouth daily  Dispense: 10 tablet; Refill: 0       Return in about 2 weeks (around 12/28/2019) for follow up if symptoms not improving.    Josué Gutiérrez, DO  [facility]      "

## 2019-12-30 ENCOUNTER — TELEPHONE (OUTPATIENT)
Dept: FAMILY MEDICINE | Facility: CLINIC | Age: 74
End: 2019-12-30

## 2019-12-30 DIAGNOSIS — N50.811 PAIN IN RIGHT TESTICLE: Primary | ICD-10-CM

## 2019-12-30 DIAGNOSIS — N45.1 EPIDIDYMITIS: ICD-10-CM

## 2019-12-30 RX ORDER — LEVOFLOXACIN 750 MG/1
750 TABLET, FILM COATED ORAL DAILY
Qty: 10 TABLET | Refills: 0 | Status: SHIPPED | OUTPATIENT
Start: 2019-12-30 | End: 2020-01-28

## 2019-12-30 NOTE — TELEPHONE ENCOUNTER
Rx for antibiotic sent to pharmacy. However, I think it is important to get an ultrasound and also follow up with urology. Orders have been placed.    Josué Gutiérrez,   12/30/2019 12:55 PM

## 2019-12-30 NOTE — TELEPHONE ENCOUNTER
Right testicle pain went away with the medication -Levaquin but came back right when he stopped. He waited a week. Pain is still there. Some days are worse than others.    Dr Gutiérrez had plan from last visit:    Epididymitis: symptoms consistent with recurrence of epididymitis. If not improving over the next 72 hours or if recurs again, would get ultrasound and urology consult for further evaluation.  - levofloxacin (LEVAQUIN) 750 MG tablet; Take 1 tablet (750 mg) by mouth daily  Dispense: 10 tablet; Refill: 0    Chivo would like to go back on a medication- I pended pharmacy and proceed with ultrasound and urology consult.    Triage: please call cell with Dr PRESTON's advice    Criselda Escamilla RN- Triage [employer]

## 2019-12-30 NOTE — TELEPHONE ENCOUNTER
Call placed to Chivo. Advised of below and phone numbers given to schedule US and to make an appointment for urology. He says he will call both to make appointments. He had no further questions or concerns. Viridiana Washington R.N.

## 2019-12-31 ENCOUNTER — HOSPITAL ENCOUNTER (OUTPATIENT)
Dept: ULTRASOUND IMAGING | Facility: CLINIC | Age: 74
Discharge: HOME | End: 2019-12-31
Attending: FAMILY MEDICINE | Admitting: FAMILY MEDICINE
Payer: MEDICARE

## 2019-12-31 ENCOUNTER — PRE VISIT (OUTPATIENT)
Dept: UROLOGY | Facility: CLINIC | Age: 74
End: 2019-12-31

## 2019-12-31 DIAGNOSIS — N45.1 EPIDIDYMITIS: ICD-10-CM

## 2019-12-31 DIAGNOSIS — N50.811 PAIN IN RIGHT TESTICLE: ICD-10-CM

## 2019-12-31 PROBLEM — N32.89 BLADDER WALL THICKENING: Status: ACTIVE | Noted: 2018-05-17

## 2019-12-31 PROBLEM — E87.6 ACUTE HYPOKALEMIA: Status: ACTIVE | Noted: 2018-05-17

## 2019-12-31 PROBLEM — Z98.890 H/O COLONOSCOPY WITH POLYPECTOMY: Status: ACTIVE | Noted: 2018-05-17

## 2019-12-31 PROBLEM — E87.1 ACUTE HYPONATREMIA: Status: ACTIVE | Noted: 2018-05-17

## 2019-12-31 PROBLEM — I10 HYPERTENSION: Status: ACTIVE | Noted: 2018-05-17

## 2019-12-31 PROBLEM — Z86.0100 H/O COLONOSCOPY WITH POLYPECTOMY: Status: ACTIVE | Noted: 2018-05-17

## 2019-12-31 PROCEDURE — 93976 VASCULAR STUDY: CPT

## 2019-12-31 NOTE — TELEPHONE ENCOUNTER
MEDICAL RECORDS REQUEST   [facility]  [address] [place] [address]  PHONE: [phone]  Fax: [phone]        FUTURE VISIT INFORMATION                                                   Steven GEOVANNI Rhoda, : 1945 scheduled for future visit at [facility]    APPOINTMENT INFORMATION:    Date: 20 8AM    Provider:  Garrett Gonzalez MD    Reason for Visit/Diagnosis: Epididymitis     REFERRAL INFORMATION:    Referring provider: KEVIN SMART    Specialty: MD    Referring providers clinic:  [facility]    Clinic contact number:  [phone]    RECORDS REQUESTED FOR VISIT                                                     NOTES  STATUS/DETAILS   OFFICE NOTE from referring provider  yes   OFFICE NOTE from other specialist  no   DISCHARGE SUMMARY from hospital  no   DISCHARGE REPORT from the ER  no   OPERATIVE REPORT  no   MEDICATION LIST  yes     PRE-VISIT CHECKLIST      Record collection complete Yes- Image in  PACS    Appointment appropriately scheduled           (right time/right provider) Yes   MyChart activation Yes   Questionnaire complete If no, please explain: In process      Completed by: Gosia Beavers

## 2019-12-31 NOTE — TELEPHONE ENCOUNTER
Reason for Visit: Consult    Diagnosis: Epididymitis    Orders/Procedures/Records: in system    Contact Patient: n/a    Rooming Requirements: normal      Greta Cabral LPN  12/31/19  9:26 AM

## 2020-01-02 ENCOUNTER — OFFICE VISIT (OUTPATIENT)
Dept: UROLOGY | Facility: CLINIC | Age: 75
End: 2020-01-02
Attending: FAMILY MEDICINE
Payer: MEDICARE

## 2020-01-02 ENCOUNTER — PRE VISIT (OUTPATIENT)
Dept: UROLOGY | Facility: CLINIC | Age: 75
End: 2020-01-02

## 2020-01-02 VITALS
HEART RATE: 82 BPM | DIASTOLIC BLOOD PRESSURE: 92 MMHG | HEIGHT: 68 IN | SYSTOLIC BLOOD PRESSURE: 147 MMHG | BODY MASS INDEX: 27.28 KG/M2 | WEIGHT: 180 LBS

## 2020-01-02 DIAGNOSIS — N45.1 EPIDIDYMITIS: Primary | ICD-10-CM

## 2020-01-02 PROCEDURE — 87086 URINE CULTURE/COLONY COUNT: CPT | Performed by: STUDENT IN AN ORGANIZED HEALTH CARE EDUCATION/TRAINING PROGRAM

## 2020-01-02 RX ORDER — SULFAMETHOXAZOLE/TRIMETHOPRIM 800-160 MG
1 TABLET ORAL 2 TIMES DAILY
Qty: 42 TABLET | Refills: 1 | Status: SHIPPED | OUTPATIENT
Start: 2020-01-02 | End: 2020-04-08

## 2020-01-02 ASSESSMENT — ENCOUNTER SYMPTOMS
STIFFNESS: 1
NECK PAIN: 0
MUSCLE CRAMPS: 0
MYALGIAS: 1
JOINT SWELLING: 0
ARTHRALGIAS: 1
MUSCLE WEAKNESS: 0
BACK PAIN: 1

## 2020-01-02 ASSESSMENT — PAIN SCALES - GENERAL: PAINLEVEL: MILD PAIN (3)

## 2020-01-02 ASSESSMENT — MIFFLIN-ST. JEOR: SCORE: 1535.97

## 2020-01-02 NOTE — NURSING NOTE
"Chief Complaint   Patient presents with     Consult     testicular pain/epididymitis       Blood pressure (!) 147/92, pulse 82, height 1.727 m (5' 8\"), weight 81.6 kg (180 lb). Body mass index is 27.37 kg/m .    Patient Active Problem List   Diagnosis     Meniere's disease     Seasonal allergic rhinitis     Family history of coronary artery disease     Hyperlipidemia LDL goal <130     Hypertension goal BP (blood pressure) < 130/80     Advanced directives, counseling/discussion     h/o prostate cancer (H) - S/p prostatectomy at 56 yo (2001)     Prediabetes     Chronic right-sided low back pain with right-sided sciatica     Adenomatous polyp of ascending colon     Onychomycosis     Swallowed foreign body, subsequent encounter     Acute hypokalemia     Acute hyponatremia     Bladder wall thickening     H/O colonoscopy with polypectomy     Hyperlipidemia     Hypertension       Allergies   Allergen Reactions     Penicillins      Other reaction(s): Throat Swelling/Closing       Current Outpatient Medications   Medication Sig Dispense Refill     atorvastatin (LIPITOR) 10 MG tablet Take 0.5 tablets (5 mg) by mouth daily 90 tablet 3     cholecalciferol (VITAMIN D3) 1000 UNIT tablet Take 1 tablet (1,000 Units) by mouth daily 30 tablet      ciclopirox (PENLAC) 8 % external solution APPLY TO AFFECTED NAIL QHS  4     co-enzyme Q-10 100 MG CAPS capsule Take by mouth daily       fluticasone (FLONASE) 50 MCG/ACT nasal spray Spray 1-2 sprays into both nostrils daily 48 g 3     guaiFENesin (MUCINEX) 600 MG 12 hr tablet Take 1 tablet (600 mg) by mouth daily 20 tablet 0     levofloxacin (LEVAQUIN) 750 MG tablet Take 1 tablet (750 mg) by mouth daily 10 tablet 0     losartan (COZAAR) 100 MG tablet Take 1 tablet (100 mg) by mouth daily 90 tablet 3     LUTEIN PO        Multiple Vitamin (MULTI-VITAMINS) TABS        polyethylene glycol (MIRALAX/GLYCOLAX) powder Take 17 g (1 capful) by mouth daily as needed for constipation       potassium " chloride ER (KLOR-CON) 20 MEQ CR tablet Take 1 tablet (20 mEq) by mouth daily 90 tablet 3     terbinafine (LAMISIL) 250 MG tablet Take 1 tablet (250 mg) by mouth daily for 7 days and repeat monthly for 4 cycles 28 tablet 0       Social History     Tobacco Use     Smoking status: Never Smoker     Smokeless tobacco: Never Used   Substance Use Topics     Alcohol use: Yes     Alcohol/week: 11.7 standard drinks     Comment: 1-2 beers nightly     Drug use: No       Greta Cabral LPN  1/2/2020  8:06 AM

## 2020-01-02 NOTE — LETTER
"1/2/2020       RE: Steven Duong  [address]  [facility] [address]     Dear Colleague,    Thank you for referring your patient, Steven Duong, to the [facility] UROLOGY AND INST FOR PROSTATE AND UROLOGIC CANCERS at [facility]. Please see a copy of my visit note below.    Urology Clinic Note  Date: 01/02/20    We are pleased to see Mr. Steven Duong in consultation at the request of Dr. Gutiérrez for the evaluation of chief complaint listed below.    Chief Complaint:    Epididymitis         History of Present Illness:   Steven Duong is a 74 year old male w/ PMH of HTN, HLD and urologic history of prostate cancer s/p prostatectomy (2001) and recently diagnosed epididymitis who presents with recurrent epididymitis. He was first seen for right testicular pain on 11/15/19. At that time he was dx with epididymitis and treated with levaquin (750 mg daily for 10 days). He reported improvement in sx after taking this abx. His sx returned after being off abx for about 10 days. Next seen on 12/14/19 with recurrent sx. Prescribed levaquin again (750 mg daily for 10 days). Pain improved then returned, this time right away after stopping antibiotic. His abx were re-ordered and ultrasound was ordered (result as below). Pain is an aching in right lower, posterior scrotum, rated 2-3/10. Worse at night laying to go to bed. He is currently taking tylenol for pain (1000 mg a few times per day). No prior hx of similar sx. He reports a hx of prostate infection years ago. Denies urinary sx (dysuria, hematuria, urgency, frequency) or urethral discharge. Positive for aching \"tendons,\" especially in the right hip and right low back. Also positive for mild upset stomach. Denies fevers, chill, vomiting.         Past Medical History:     Past Medical History:   Diagnosis Date     Hyperlipidemia LDL goal < 130      Hypertension goal BP (blood pressure) < 140/90      Meniere's " "disease, unspecified      Pericarditis 2007     Personal history of prostate cancer        Meniere's disease     Seasonal allergic rhinitis     Family history of coronary artery disease     Hyperlipidemia LDL goal <130     Hypertension goal BP (blood pressure) < 130/80     Advanced directives, counseling/discussion     h/o prostate cancer (H) - S/p prostatectomy at 54 yo ()     Prediabetes     Chronic right-sided low back pain with right-sided sciatica     Adenomatous polyp of ascending colon     Onychomycosis     Swallowed foreign body, subsequent encounter     \"slipped disc\" - lower right back pain         Past Surgical History:     Past Surgical History:   Procedure Laterality Date     SUPRAPUBIC PROSTATECTOMY       TONSILLECTOMY              Social History:   Retired (worked in sales in construction industry).        Smoking: Never  Alcohol: 2 drinks per day  IV Drug Use: None         Family History:     Family History   Problem Relation Age of Onset     Coronary Artery Disease Paternal Grandfather      Cerebrovascular Disease Mother         with associated parkinsons     Coronary Artery Disease Father 64        bypass x 2 (initial at 65 yo)     Prostate Cancer Father 70     No Known Problems Sister      Prostate Cancer Brother      Brain Cancer Son 13     Breast Cancer Daughter      Liver Cancer Daughter      Melanoma Daughter      No Known Problems Sister      No Known Problems Sister      Prostate Cancer Brother      Colon Cancer No family hx of      Prostate cancer in father, 2 brothers, and first cousin (who  of prostate cancer).  Denies family hx of problems with anesthesia, blood clots or bleeding easily.         Allergies:     Allergies   Allergen Reactions     Penicillins      Other reaction(s): Throat Swelling/Closing          Medications:     Current Outpatient Medications   Medication Sig     atorvastatin (LIPITOR) 10 MG tablet Take 0.5 tablets (5 mg) by mouth daily     " "cholecalciferol (VITAMIN D3) 1000 UNIT tablet Take 1 tablet (1,000 Units) by mouth daily     ciclopirox (PENLAC) 8 % external solution APPLY TO AFFECTED NAIL QHS     co-enzyme Q-10 100 MG CAPS capsule Take by mouth daily     fluticasone (FLONASE) 50 MCG/ACT nasal spray Spray 1-2 sprays into both nostrils daily     guaiFENesin (MUCINEX) 600 MG 12 hr tablet Take 1 tablet (600 mg) by mouth daily     levofloxacin (LEVAQUIN) 750 MG tablet Take 1 tablet (750 mg) by mouth daily     losartan (COZAAR) 100 MG tablet Take 1 tablet (100 mg) by mouth daily     LUTEIN PO      Multiple Vitamin (MULTI-VITAMINS) TABS      polyethylene glycol (MIRALAX/GLYCOLAX) powder Take 17 g (1 capful) by mouth daily as needed for constipation     potassium chloride ER (KLOR-CON) 20 MEQ CR tablet Take 1 tablet (20 mEq) by mouth daily     terbinafine (LAMISIL) 250 MG tablet Take 1 tablet (250 mg) by mouth daily for 7 days and repeat monthly for 4 cycles     No current facility-administered medications for this visit.             REVIEW OF SYSTEMS:    See HPI and patient inventory (below) for pertinent details.  Remainder of 10-point ROS negative.    Answers for HPI/ROS submitted by the patient on 1/2/2020   General Symptoms: No  Skin Symptoms: No  HENT Symptoms: No  EYE SYMPTOMS: No  HEART SYMPTOMS: No  LUNG SYMPTOMS: No  INTESTINAL SYMPTOMS: No  URINARY SYMPTOMS: No  REPRODUCTIVE SYMPTOMS: No  SKELETAL SYMPTOMS: Yes  BLOOD SYMPTOMS: No  NERVOUS SYSTEM SYMPTOMS: No  MENTAL HEALTH SYMPTOMS: No  Back pain: Yes  Muscle aches: Yes  Neck pain: No  Swollen joints: No  Joint pain: Yes  Bone pain: Yes  Muscle cramps: No  Muscle weakness: No  Joint stiffness: Yes  Bone fracture: No         PHYSICAL EXAM   BP (!) 147/92   Pulse 82   Ht 1.727 m (5' 8\")   Wt 81.6 kg (180 lb)   BMI 27.37 kg/m     GENERAL: No acute distress. Well nourished.   HEENT:  Sclerae anicteric.  Conjunctivae pink.  Moist mucous membranes.  CARDIAC:  Warm, well perfused.  LUNGS:  " "Non-labored breathing  ABDOMEN: Soft, non-tender, lower midline vertical surgical scar well healed, non-tender.  :  Phallus circumcised, meatus adequate w/ no discharge, no plaques palpated. Testes descended bilaterally, no intratesticular masses, no overlying skin changes.  Epididymis tender to palpation on right, full and sensitive to palpation on left (consistent with post vasectomy/ prostatectomy).  No varicoceles or inguinal hernias. Cord palpable bilaterally and nontender.  RECTAL: Deferred.  SKIN: No rashes.  Dry.     EXTREMITIES:  Warm, well perfused.  No lower extremity edema bilaterally.  NEURO: normal gait, no focal deficits.           LABS AND IMAGIN/17/19  PSA 0.02    US testicular and scrotum w/ doppler - 19 - please refer to full report  \"FINDINGS: The testicles bilaterally are homogeneous in echotexture  without focal mass. The right testicle measures 4.5 x 3.4 x 2.4 cm.  The left testicle measures 4.9 x 3.2 x 2.3 cm. Color Doppler waveform  analysis demonstrates normal arterial waveforms within the testicles.  No evidence of testicular torsion. The right epididymis is normal. The  left epididymis is normal. No varicoceles or hydroceles are evident.                                                                     IMPRESSION:  Normal testicles without torsion or mass. No evidence of  epididymitis. No evidence of hydrocele or varicocele.\"          ASSESSMENT:   Steven Duong is a 74 year old male w/ hx HTN, HLD, and prostate cancer s/p prostatectomy who presents for recurrent epididymitis. Previously tx with multiple courses of levaquin. US WNL.          PLAN:     UA w/ micro and Urine culture today    Stop levaquin (currently endorsing worsening right hip pain).  Advised against heavy lifting until musculoskeletal discomfort is better.    Bactrim x 3 weeks, 1 refill if needed.  Advised watch for sun sensitivity as they are going to [place] next week.    Discussed surgical options " of epididymectomy, microscopic denervation of the spermatic cord, or orchiectomy as a last resort.    Advised antibiotics, NSAIDS, warm soaks, scrotal support for empirical treatment of epididymitis    F/u as needed    Patient seen and examined then discussed with staff, Dr. Gonzalez, who developed the above treatment plan.    Gopal Torrez MD  Urology, PGY-2    I saw and examined the patient with the resident today.  I agree with the resident note and plan of care as above.     Garrett Gonzalez MD  Urology Staff

## 2020-01-02 NOTE — PROGRESS NOTES
"Urology Clinic Note  Date: 01/02/20    We are pleased to see Mr. Steven Duong in consultation at the request of Dr. Gutiérrez for the evaluation of chief complaint listed below.    Chief Complaint:    Epididymitis         History of Present Illness:   Steven Duong is a 74 year old male w/ PMH of HTN, HLD and urologic history of prostate cancer s/p prostatectomy (2001) and recently diagnosed epididymitis who presents with recurrent epididymitis. He was first seen for right testicular pain on 11/15/19. At that time he was dx with epididymitis and treated with levaquin (750 mg daily for 10 days). He reported improvement in sx after taking this abx. His sx returned after being off abx for about 10 days. Next seen on 12/14/19 with recurrent sx. Prescribed levaquin again (750 mg daily for 10 days). Pain improved then returned, this time right away after stopping antibiotic. His abx were re-ordered and ultrasound was ordered (result as below). Pain is an aching in right lower, posterior scrotum, rated 2-3/10. Worse at night laying to go to bed. He is currently taking tylenol for pain (1000 mg a few times per day). No prior hx of similar sx. He reports a hx of prostate infection years ago. Denies urinary sx (dysuria, hematuria, urgency, frequency) or urethral discharge. Positive for aching \"tendons,\" especially in the right hip and right low back. Also positive for mild upset stomach. Denies fevers, chill, vomiting.         Past Medical History:     Past Medical History:   Diagnosis Date     Hyperlipidemia LDL goal < 130      Hypertension goal BP (blood pressure) < 140/90      Meniere's disease, unspecified      Pericarditis 2007     Personal history of prostate cancer 2001       Meniere's disease     Seasonal allergic rhinitis     Family history of coronary artery disease     Hyperlipidemia LDL goal <130     Hypertension goal BP (blood pressure) < 130/80     Advanced directives, counseling/discussion     h/o prostate " "cancer (H) - S/p prostatectomy at 54 yo ()     Prediabetes     Chronic right-sided low back pain with right-sided sciatica     Adenomatous polyp of ascending colon     Onychomycosis     Swallowed foreign body, subsequent encounter     \"slipped disc\" - lower right back pain         Past Surgical History:     Past Surgical History:   Procedure Laterality Date     SUPRAPUBIC PROSTATECTOMY       TONSILLECTOMY  1953            Social History:   Retired (worked in sales in construction industry).        Smoking: Never  Alcohol: 2 drinks per day  IV Drug Use: None         Family History:     Family History   Problem Relation Age of Onset     Coronary Artery Disease Paternal Grandfather      Cerebrovascular Disease Mother         with associated parkinsons     Coronary Artery Disease Father 64        bypass x 2 (initial at 65 yo)     Prostate Cancer Father 70     No Known Problems Sister      Prostate Cancer Brother      Brain Cancer Son 13     Breast Cancer Daughter      Liver Cancer Daughter      Melanoma Daughter      No Known Problems Sister      No Known Problems Sister      Prostate Cancer Brother      Colon Cancer No family hx of      Prostate cancer in father, 2 brothers, and first cousin (who  of prostate cancer).  Denies family hx of problems with anesthesia, blood clots or bleeding easily.         Allergies:     Allergies   Allergen Reactions     Penicillins      Other reaction(s): Throat Swelling/Closing          Medications:     Current Outpatient Medications   Medication Sig     atorvastatin (LIPITOR) 10 MG tablet Take 0.5 tablets (5 mg) by mouth daily     cholecalciferol (VITAMIN D3) 1000 UNIT tablet Take 1 tablet (1,000 Units) by mouth daily     ciclopirox (PENLAC) 8 % external solution APPLY TO AFFECTED NAIL QHS     co-enzyme Q-10 100 MG CAPS capsule Take by mouth daily     fluticasone (FLONASE) 50 MCG/ACT nasal spray Spray 1-2 sprays into both nostrils daily     guaiFENesin (MUCINEX) 600 MG 12 " "hr tablet Take 1 tablet (600 mg) by mouth daily     levofloxacin (LEVAQUIN) 750 MG tablet Take 1 tablet (750 mg) by mouth daily     losartan (COZAAR) 100 MG tablet Take 1 tablet (100 mg) by mouth daily     LUTEIN PO      Multiple Vitamin (MULTI-VITAMINS) TABS      polyethylene glycol (MIRALAX/GLYCOLAX) powder Take 17 g (1 capful) by mouth daily as needed for constipation     potassium chloride ER (KLOR-CON) 20 MEQ CR tablet Take 1 tablet (20 mEq) by mouth daily     terbinafine (LAMISIL) 250 MG tablet Take 1 tablet (250 mg) by mouth daily for 7 days and repeat monthly for 4 cycles     No current facility-administered medications for this visit.             REVIEW OF SYSTEMS:    See HPI and patient inventory (below) for pertinent details.  Remainder of 10-point ROS negative.    Answers for HPI/ROS submitted by the patient on 1/2/2020   General Symptoms: No  Skin Symptoms: No  HENT Symptoms: No  EYE SYMPTOMS: No  HEART SYMPTOMS: No  LUNG SYMPTOMS: No  INTESTINAL SYMPTOMS: No  URINARY SYMPTOMS: No  REPRODUCTIVE SYMPTOMS: No  SKELETAL SYMPTOMS: Yes  BLOOD SYMPTOMS: No  NERVOUS SYSTEM SYMPTOMS: No  MENTAL HEALTH SYMPTOMS: No  Back pain: Yes  Muscle aches: Yes  Neck pain: No  Swollen joints: No  Joint pain: Yes  Bone pain: Yes  Muscle cramps: No  Muscle weakness: No  Joint stiffness: Yes  Bone fracture: No         PHYSICAL EXAM   BP (!) 147/92   Pulse 82   Ht 1.727 m (5' 8\")   Wt 81.6 kg (180 lb)   BMI 27.37 kg/m    GENERAL: No acute distress. Well nourished.   HEENT:  Sclerae anicteric.  Conjunctivae pink.  Moist mucous membranes.  CARDIAC:  Warm, well perfused.  LUNGS:  Non-labored breathing  ABDOMEN: Soft, non-tender, lower midline vertical surgical scar well healed, non-tender.  :  Phallus circumcised, meatus adequate w/ no discharge, no plaques palpated. Testes descended bilaterally, no intratesticular masses, no overlying skin changes.  Epididymis tender to palpation on right, full and sensitive to palpation on " "left (consistent with post vasectomy/ prostatectomy).  No varicoceles or inguinal hernias. Cord palpable bilaterally and nontender.  RECTAL: Deferred.  SKIN: No rashes.  Dry.     EXTREMITIES:  Warm, well perfused.  No lower extremity edema bilaterally.  NEURO: normal gait, no focal deficits.           LABS AND IMAGIN/17/19  PSA 0.02    US testicular and scrotum w/ doppler - 19 - please refer to full report  \"FINDINGS: The testicles bilaterally are homogeneous in echotexture  without focal mass. The right testicle measures 4.5 x 3.4 x 2.4 cm.  The left testicle measures 4.9 x 3.2 x 2.3 cm. Color Doppler waveform  analysis demonstrates normal arterial waveforms within the testicles.  No evidence of testicular torsion. The right epididymis is normal. The  left epididymis is normal. No varicoceles or hydroceles are evident.                                                                     IMPRESSION:  Normal testicles without torsion or mass. No evidence of  epididymitis. No evidence of hydrocele or varicocele.\"          ASSESSMENT:   Steven Duong is a 74 year old male w/ hx HTN, HLD, and prostate cancer s/p prostatectomy who presents for recurrent epididymitis. Previously tx with multiple courses of levaquin. US WNL.          PLAN:     UA w/ micro and Urine culture today    Stop levaquin (currently endorsing worsening right hip pain).  Advised against heavy lifting until musculoskeletal discomfort is better.    Bactrim x 3 weeks, 1 refill if needed.  Advised watch for sun sensitivity as they are going to [place] next week.    Discussed surgical options of epididymectomy, microscopic denervation of the spermatic cord, or orchiectomy as a last resort.    Advised antibiotics, NSAIDS, warm soaks, scrotal support for empirical treatment of epididymitis    F/u as needed    Patient seen and examined then discussed with staff, Dr. Gonzalez, who developed the above treatment plan.    Gopal Torrez, " MD  Urology, PGY-2    I saw and examined the patient with the resident today.  I agree with the resident note and plan of care as above.     Garrett Gonzalez MD  Urology Staff    no

## 2020-01-25 ASSESSMENT — ACTIVITIES OF DAILY LIVING (ADL): CURRENT_FUNCTION: NO ASSISTANCE NEEDED

## 2020-01-27 ASSESSMENT — ACTIVITIES OF DAILY LIVING (ADL): CURRENT_FUNCTION: NO ASSISTANCE NEEDED

## 2020-01-27 NOTE — PROGRESS NOTES
"SUBJECTIVE:   Steven Duong is a 74 year old male who presents for Preventive Visit.  Are you in the first 12 months of your Medicare coverage?  No    Healthy Habits:     In general, how would you rate your overall health?  Good    Frequency of exercise:  2-3 days/week    Duration of exercise:  15-30 minutes    Do you usually eat at least 4 servings of fruit and vegetables a day, include whole grains    & fiber and avoid regularly eating high fat or \"junk\" foods?  No    Taking medications regularly:  Yes    Medication side effects:  Muscle aches    Ability to successfully perform activities of daily living:  No assistance needed    Home Safety:  No safety concerns identified    Hearing Impairment:  Difficulty understanding soft or whispered speech    In the past 6 months, have you been bothered by leaking of urine?  No    In general, how would you rate your overall mental or emotional health?  Excellent      PHQ-2 Total Score: 0    Additional concerns today:  Yes    Testicular Pain - Recurrent Epididymitis   Mildly painful to the touch. Sees urology.     Do you feel safe in your environment? Yes    Have you ever done Advance Care Planning? (For example, a Health Directive, POLST, or a discussion with a medical provider or your loved ones about your wishes): Yes, patient states has an Advance Care Planning document and will bring a copy to the clinic.    Fall risk  Fallen 2 or more times in the past year?: No  Any fall with injury in the past year?: No    Cognitive Screening   1) Repeat 3 items (Leader, Season, Table)    2) Clock draw: NORMAL  3) 3 item recall: Recalls 3 objects  Results: 3 items recalled: COGNITIVE IMPAIRMENT LESS LIKELY    Mini-CogTM Copyright KESHAWN Butcher. Licensed by the author for use in [facility]; reprinted with permission ([email or web address]@.[email or web address]). All rights reserved.      Do you have sleep apnea, excessive snoring or daytime drowsiness?: no    Reviewed and updated as needed this visit by " clinical staff  Tobacco  Allergies  Meds  Problems  Med Hx  Surg Hx  Fam Hx  Soc Hx          Reviewed and updated as needed this visit by Provider  Tobacco  Allergies  Meds  Problems  Med Hx  Surg Hx  Fam Hx        Social History     Tobacco Use     Smoking status: Never Smoker     Smokeless tobacco: Never Used   Substance Use Topics     Alcohol use: Yes     Alcohol/week: 11.7 standard drinks     Comment: 1-2 beers nightly     If you drink alcohol do you typically have >3 drinks per day or >7 drinks per week? No    Alcohol Use 1/28/2020   Prescreen: >3 drinks/day or >7 drinks/week? -   Prescreen: >3 drinks/day or >7 drinks/week? No     Hyperlipidemia Follow-Up    Are you regularly taking any medication or supplement to lower your cholesterol?   Yes- Lipitor    Are you having muscle aches or other side effects that you think could be caused by your cholesterol lowering medication?  No    Hypertension Follow-up    Do you check your blood pressure regularly outside of the clinic? No     Are you following a low salt diet? Yes    Are your blood pressures ever more than 140 on the top number (systolic) OR more   than 90 on the bottom number (diastolic), for example 140/90? Does not check    Current providers sharing in care for this patient include:   Patient Care Team:  Nash Walker MD as PCP - General  Nash Walker MD as Assigned PCP  Garrett Gonzalez MD as MD (Urology)  Aurora Mckeon, RN as Specialty Care Coordinator (Urology)  Josué Gutiérrez DO as MD (Family Practice)    The following health maintenance items are reviewed in Epic and correct as of today:  Health Maintenance   Topic Date Due     LIPID  01/17/2020     MICROALBUMIN  01/17/2020     FALL RISK ASSESSMENT  01/17/2020     MEDICARE ANNUAL WELLNESS VISIT  01/17/2020     PSA  01/17/2020     BMP  06/05/2020     COLONOSCOPY  07/25/2020     DTAP/TDAP/TD IMMUNIZATION (3 - Td) 01/12/2022     ADVANCE CARE PLANNING  01/16/2023      "HEPATITIS C SCREENING  Completed     PHQ-2  Completed     PNEUMOCOCCAL IMMUNIZATION 65+ LOW/MEDIUM RISK  Completed     AORTIC ANEURYSM SCREENING (SYSTEM ASSIGNED)  Completed     INFLUENZA VACCINE  Addressed     ZOSTER IMMUNIZATION  Addressed     IPV IMMUNIZATION  Aged Out     MENINGITIS IMMUNIZATION  Aged Out     Zoster vaccine and flu shot declined today     ROS:  Constitutional, HEENT, cardiovascular, pulmonary, GI, , musculoskeletal, neuro, skin, endocrine and psych systems are negative, except as otherwise noted.    This document serves as a record of the services and decisions personally performed and made by Nash Walker MD. It was created on his behalf by Azeem Alonso, a trained medical scribe. The creation of this document is based the provider's statements to the medical scribe.  Scribe Azeem Alonso 10:00 AM, January 28, 2020    OBJECTIVE:   /80   Pulse 83   Temp 98.4  F (36.9  C) (Oral)   Ht 1.727 m (5' 8\")   Wt 80.3 kg (177 lb)   SpO2 97%   BMI 26.91 kg/m   Estimated body mass index is 26.91 kg/m  as calculated from the following:    Height as of this encounter: 1.727 m (5' 8\").    Weight as of this encounter: 80.3 kg (177 lb).  EXAM:   GENERAL: healthy, alert and no distress  EYES: Eyes grossly normal to inspection, PERRL and conjunctivae and sclerae normal  HENT: ear canals and TM's normal, nose and mouth without ulcers or lesions  NECK: no adenopathy, no asymmetry, masses, or scars and thyroid normal to palpation  RESP: lungs clear to auscultation - no rales, rhonchi or wheezes  BREAST: normal without masses, tenderness or nipple discharge and no palpable axillary masses or adenopathy  CV: regular rate and rhythm, normal S1 S2, no S3 or S4, no murmur, click or rub, no peripheral edema and peripheral pulses strong  ABDOMEN: soft, nontender, no hepatosplenomegaly, no masses and bowel sounds normal   (male): mild tenderness on the right testicle, otherwise, normal male genitalia without " lesions or urethral discharge, no hernia  RECTAL: deferred  MS: no gross musculoskeletal defects noted, no edema  BACK: negative straight leg raising, no CVA tenderness, no paralumbar tenderness  SKIN: no suspicious lesions or rashes  NEURO: Normal strength and tone, mentation intact and speech normal  PSYCH: mentation appears normal, affect normal/bright  LYMPH: no cervical, supraclavicular, axillary, or inguinal adenopathy    ASSESSMENT / PLAN:   Steven was seen today for physical.    Diagnoses and all orders for this visit:    Encounter for Medicare annual wellness exam  -     CBC with platelets    h/o prostate cancer (H) - S/p prostatectomy at 56 yo (2001) - sees urologist.   -     PSA, tumor marker    Epididymitis / Pain in right testicle - mild tenderness in right testicle. Sees urology. If pain continues consider returning to urology for further plan.    Hypertension goal BP (blood pressure) < 130/80 - blood pressure controlled today.   -     Albumin Random Urine Quantitative with Creat Ratio  -     Basic metabolic panel  -     losartan (COZAAR) 100 MG tablet; Take 1 tablet (100 mg) by mouth daily  -     potassium chloride ER (KLOR-CON) 20 MEQ CR tablet; Take 1 tablet (20 mEq) by mouth daily    Hyperlipidemia LDL goal <100 - last LDL controlled. Labs pending today.   -     Lipid panel reflex to direct LDL Fasting  -     atorvastatin (LIPITOR) 10 MG tablet; Take 0.5 tablets (5 mg) by mouth daily    Seasonal allergic rhinitis, unspecified trigger - controlled - continue medication.  -     fluticasone (FLONASE) 50 MCG/ACT nasal spray; Spray 1-2 sprays into both nostrils daily      End of Life Planning:  Patient currently has an advanced directive: No.  I have verified the patient's ablity to prepare an advanced directive/make health care decisions.  Literature was provided to assist patient in preparing an advanced directive.    COUNSELING:  Reviewed preventive health counseling, as reflected in patient  "instructions    Estimated body mass index is 26.91 kg/m  as calculated from the following:    Height as of this encounter: 1.727 m (5' 8\").    Weight as of this encounter: 80.3 kg (177 lb).     reports that he has never smoked. He has never used smokeless tobacco.    Appropriate preventive services were discussed with this patient, including applicable screening as appropriate for cardiovascular disease, diabetes, osteopenia/osteoporosis, and glaucoma.  As appropriate for age/gender, discussed screening for colorectal cancer, prostate cancer, breast cancer, and cervical cancer. Checklist reviewing preventive services available has been given to the patient.    Reviewed patients plan of care and provided an AVS. The Basic Care Plan (routine screening as documented in Health Maintenance) for Steven meets the Care Plan requirement. This Care Plan has been established and reviewed with the Patient.    Counseling Resources:  ATP IV Guidelines  Pooled Cohorts Equation Calculator  Breast Cancer Risk Calculator  FRAX Risk Assessment  ICSI Preventive Guidelines  Dietary Guidelines for Americans, 2010  USDA's MyPlate  ASA Prophylaxis  Lung CA Screening    The information in this document, created by the medical scribe for me, accurately reflects the services I personally performed and the decisions made by me. I have reviewed and approved this document for accuracy prior to leaving the patient care area.  10:14 AM, 01/28/20    Nash Walker MD  [facility] [place]    "

## 2020-01-28 ENCOUNTER — OFFICE VISIT (OUTPATIENT)
Dept: FAMILY MEDICINE | Facility: CLINIC | Age: 75
End: 2020-01-28
Payer: MEDICARE

## 2020-01-28 VITALS
TEMPERATURE: 98.4 F | HEIGHT: 68 IN | WEIGHT: 177 LBS | HEART RATE: 83 BPM | OXYGEN SATURATION: 97 % | SYSTOLIC BLOOD PRESSURE: 126 MMHG | DIASTOLIC BLOOD PRESSURE: 80 MMHG | BODY MASS INDEX: 26.83 KG/M2

## 2020-01-28 DIAGNOSIS — Z00.00 ENCOUNTER FOR MEDICARE ANNUAL WELLNESS EXAM: Primary | ICD-10-CM

## 2020-01-28 DIAGNOSIS — C61 PROSTATE CANCER (H): ICD-10-CM

## 2020-01-28 DIAGNOSIS — N45.1 EPIDIDYMITIS: ICD-10-CM

## 2020-01-28 DIAGNOSIS — N50.811 PAIN IN RIGHT TESTICLE: ICD-10-CM

## 2020-01-28 DIAGNOSIS — J30.2 SEASONAL ALLERGIC RHINITIS, UNSPECIFIED TRIGGER: ICD-10-CM

## 2020-01-28 DIAGNOSIS — E78.5 HYPERLIPIDEMIA LDL GOAL <100: ICD-10-CM

## 2020-01-28 DIAGNOSIS — I10 HYPERTENSION GOAL BP (BLOOD PRESSURE) < 130/80: ICD-10-CM

## 2020-01-28 LAB
ERYTHROCYTE [DISTWIDTH] IN BLOOD BY AUTOMATED COUNT: 11.9 % (ref 10–15)
HCT VFR BLD AUTO: 50.7 % (ref 40–53)
HGB BLD-MCNC: 17.6 G/DL (ref 13.3–17.7)
MCH RBC QN AUTO: 31.5 PG (ref 26.5–33)
MCHC RBC AUTO-ENTMCNC: 34.7 G/DL (ref 31.5–36.5)
MCV RBC AUTO: 91 FL (ref 78–100)
PLATELET # BLD AUTO: 216 10E9/L (ref 150–450)
RBC # BLD AUTO: 5.59 10E12/L (ref 4.4–5.9)
WBC # BLD AUTO: 6.6 10E9/L (ref 4–11)

## 2020-01-28 PROCEDURE — 80061 LIPID PANEL: CPT | Performed by: FAMILY MEDICINE

## 2020-01-28 PROCEDURE — 80048 BASIC METABOLIC PNL TOTAL CA: CPT | Performed by: FAMILY MEDICINE

## 2020-01-28 PROCEDURE — 84153 ASSAY OF PSA TOTAL: CPT | Performed by: FAMILY MEDICINE

## 2020-01-28 PROCEDURE — 36415 COLL VENOUS BLD VENIPUNCTURE: CPT | Performed by: FAMILY MEDICINE

## 2020-01-28 PROCEDURE — 85027 COMPLETE CBC AUTOMATED: CPT | Performed by: FAMILY MEDICINE

## 2020-01-28 PROCEDURE — 82043 UR ALBUMIN QUANTITATIVE: CPT | Performed by: FAMILY MEDICINE

## 2020-01-28 PROCEDURE — G0439 PPPS, SUBSEQ VISIT: HCPCS | Performed by: FAMILY MEDICINE

## 2020-01-28 RX ORDER — ATORVASTATIN CALCIUM 10 MG/1
5 TABLET, FILM COATED ORAL DAILY
Qty: 90 TABLET | Refills: 3 | Status: SHIPPED | OUTPATIENT
Start: 2020-01-28 | End: 2020-01-30

## 2020-01-28 RX ORDER — FLUTICASONE PROPIONATE 50 MCG
1-2 SPRAY, SUSPENSION (ML) NASAL DAILY
Qty: 48 G | Refills: 3 | Status: SHIPPED | OUTPATIENT
Start: 2020-01-28 | End: 2021-05-03

## 2020-01-28 RX ORDER — POTASSIUM CHLORIDE 1500 MG/1
20 TABLET, EXTENDED RELEASE ORAL DAILY
Qty: 90 TABLET | Refills: 3 | Status: SHIPPED | OUTPATIENT
Start: 2020-01-28 | End: 2021-02-15

## 2020-01-28 RX ORDER — LOSARTAN POTASSIUM 100 MG/1
100 TABLET ORAL DAILY
Qty: 90 TABLET | Refills: 3 | Status: SHIPPED | OUTPATIENT
Start: 2020-01-28 | End: 2021-02-15

## 2020-01-28 ASSESSMENT — MIFFLIN-ST. JEOR: SCORE: 1522.37

## 2020-01-28 NOTE — PROGRESS NOTES
The patient was counseled and encouraged to consider modifying their diet and eating habits. He was provided with information on recommended healthy diet options.  The patient was provided with written information regarding signs of hearing loss.

## 2020-01-28 NOTE — PATIENT INSTRUCTIONS
Patient Education   Preventive Health Recommendations  See your health care provider every year to    Review health changes.     Discuss preventive care.      Review your medicines if your doctor has prescribed any.    Talk with your health care provider about whether you should have a test to screen for prostate cancer (PSA).    Every 3 years, have a diabetes test (fasting glucose). If you are at risk for diabetes, you should have this test more often.    Every 5 years, have a cholesterol test. Have this test more often if you are at risk for high cholesterol or heart disease.     Every 10 years, have a colonoscopy. Or, have a yearly FIT test (stool test). These exams will check for colon cancer.    Talk to with your health care provider about screening for Abdominal Aortic Aneurysm if you have a family history of AAA or have a history of smoking.    Shots:     Get a flu shot each year.     Get a tetanus shot every 10 years.     Talk to your doctor about your pneumonia vaccines. There are now two you should receive - Pneumovax (PPSV 23) and Prevnar (PCV 13).    Talk to your pharmacist about a shingles vaccine.     Talk to your doctor about the hepatitis B vaccine.    Nutrition:     Eat at least 5 servings of fruits and vegetables each day.     Eat whole-grain bread, whole-wheat pasta and brown rice instead of white grains and rice.     Get adequate Calcium and Vitamin D.     Lifestyle    Exercise for at least 150 minutes a week (30 minutes a day, 5 days a week). This will help you control your weight and prevent disease.     Limit alcohol to one drink per day.     No smoking.     Wear sunscreen to prevent skin cancer.     See your dentist every six months for an exam and cleaning.     See your eye doctor every 1 to 2 years to screen for conditions such as glaucoma, macular degeneration and cataracts.    Personalized Prevention Plan  You are due for the preventive services outlined below.  Your care team is  available to assist you in scheduling these services.  If you have already completed any of these items, please share that information with your care team to update in your medical record.  Health Maintenance Due   Topic Date Due     Cholesterol Lab  01/17/2020     Kidney Microalbumin Urine Test  01/17/2020     FALL RISK ASSESSMENT  01/17/2020     Zoster (Shingles) Vaccine (2 of 2) 03/14/2019     Annual Wellness Visit  01/17/2020     Prostate Test  01/17/2020       Understanding [employer] MyPlate  The USDA (U.S. Department of Agriculture) has guidelines to help you make healthy food choices. These are called MyPlate. MyPlate shows the food groups that make up healthy meals using the image of a place setting. Before you eat, think about the healthiest choices for what to put onto your plate or into your cup or bowl. To learn more about building a healthy plate, visit www.choosemyplate.gov.    The food groups    Fruits. Any fruit or 100% fruit juice counts as part of the Fruit Group. Fruits may be fresh, canned, frozen, or dried, and may be whole, cut-up, or pureed. Make half your plate fruits and vegetables.    Vegetables. Any vegetable or 100% vegetable juice counts as a member of the Vegetable Group. Vegetables may be fresh, frozen, canned, or dried. They can be served raw or cooked and may be whole, cut-up, or mashed. Make half your plate fruits and vegetables.    Grains. All foods made from grains are part of the Grains Group. These include wheat, rice, oats, cornmeal, and barley such as bread, pasta, oatmeal, cereal, tortillas, and grits. Grains should be no more than a quarter of your plate. At least half of your grains should be whole grains.    Protein. This group includes meat, poultry, seafood, beans and peas, eggs, processed soy products (like tofu), nuts (including nut butters), and seeds. Make protein choices no more than a quarter of your plate. Meat and poultry choices should be lean or low fat.    Dairy.  All fluid milk products and foods made from milk that contain calcium, like yogurt and cheese, are part of the Dairy Group. (Foods that have little calcium, such as cream, butter, and cream cheese, are not part of the group.) Most dairy choices should be low-fat or fat-free.    Oils. These are fats that are liquid at room temperature. They include canola, corn, olive, soybean, and sunflower oil. Foods that are mainly oil include mayonnaise, certain salad dressings, and soft margarines. You should have only 5 to 7 teaspoons of oils a day. You probably already get this much from the food you eat.  Date Last Reviewed: 8/1/2017 2000-2019 [employer]. [address] [place] [address], [place] [address]. All rights reserved. This information is not intended as a substitute for professional medical care. Always follow your healthcare professional's instructions.          Signs of Hearing Loss     Hearing much better with one ear can be a sign of hearing loss.     Hearing loss is a problem shared by many people. In fact, it is one of the most common health conditions, particularly as people age. Most people over age 65 have some hearing loss, and by age 80, almost everyone does. Because hearing loss usually occurs slowly over the years, you may not realize your hearing ability has gotten worse.  Have your hearing checked  Contact your healthcare provider if you:    Have to strain to hear normal conversation    Have to watch other people s faces very carefully to follow what they re saying    Need to ask people to repeat what they ve said    Often misunderstand what people are saying    Turn the volume of the television or radio up so high that others complain    Feel that people are mumbling when they re talking to you    Find that the effort to hear leaves you feeling tired and irritated    Notice, when using the phone, that you hear better with one ear than the other  Date Last Reviewed: 12/1/2016 2000-2019 The  [employer]. [address] [place] [address], [place] [address]. All rights reserved. This information is not intended as a substitute for professional medical care. Always follow your healthcare professional's instructions.

## 2020-01-29 LAB
ANION GAP SERPL CALCULATED.3IONS-SCNC: 8 MMOL/L (ref 3–14)
BUN SERPL-MCNC: 15 MG/DL (ref 7–30)
CALCIUM SERPL-MCNC: 9.4 MG/DL (ref 8.5–10.1)
CHLORIDE SERPL-SCNC: 104 MMOL/L (ref 94–109)
CHOLEST SERPL-MCNC: 186 MG/DL
CO2 SERPL-SCNC: 25 MMOL/L (ref 20–32)
CREAT SERPL-MCNC: 1.16 MG/DL (ref 0.66–1.25)
CREAT UR-MCNC: 124 MG/DL
GFR SERPL CREATININE-BSD FRML MDRD: 62 ML/MIN/{1.73_M2}
GLUCOSE SERPL-MCNC: 109 MG/DL (ref 70–99)
HDLC SERPL-MCNC: 67 MG/DL
LDLC SERPL CALC-MCNC: 106 MG/DL
MICROALBUMIN UR-MCNC: 31 MG/L
MICROALBUMIN/CREAT UR: 25.08 MG/G CR (ref 0–17)
NONHDLC SERPL-MCNC: 119 MG/DL
POTASSIUM SERPL-SCNC: 4.6 MMOL/L (ref 3.4–5.3)
PSA SERPL-MCNC: 0.03 UG/L (ref 0–4)
SODIUM SERPL-SCNC: 137 MMOL/L (ref 133–144)
TRIGL SERPL-MCNC: 63 MG/DL

## 2020-01-30 DIAGNOSIS — E78.5 HYPERLIPIDEMIA LDL GOAL <100: ICD-10-CM

## 2020-01-30 RX ORDER — ATORVASTATIN CALCIUM 10 MG/1
10 TABLET, FILM COATED ORAL DAILY
Qty: 90 TABLET | Refills: 3
Start: 2020-01-30 | End: 2020-04-08

## 2020-01-30 NOTE — RESULT ENCOUNTER NOTE
Dear Chivo,    Here is a summary of your recent test results:  -Normal red blood cell (hgb) levels, normal white blood cell count and normal platelet levels.  -PSA (prostate specific antigen) test is essentially not detected.  This indicates a low likelihood of prostate cancer issues.  ADVISE: rechecking this in 1 year.  -Cholesterol levels are at your goal levels.  ADVISE: continuing your medication, a regular exercise program with at least 150 minutes of aerobic exercise per week, and eating a low saturated fat/low carbohydrate diet.  Also, you should recheck this fasting cholesterol panel in 12 months.  -Kidney function (GFR) is normal.  -Sodium is normal.  -Potassium is normal.  -Calcium is normal.  -Glucose is slight elevated and may be a sign of early diabetes (prediabetes). ADVISE:: eating a low carbohydrate diet, exercising, trying to lose weight (if necessary) and rechecking your glucose level in 12 months.  -Microalbumin (urine protein) level is elevated. This is suggestive of early damage to your kidneys from high blood pressure.  ADVISE: avoiding anti-inflamatory agents such as ibuprofen (Advil, Motrin) or naproxen (Aleve) as much as possible, keeping your blood pressure in a normal range, and continuing your medication (losartan) that helps protect your kidneys.  Also, this should be rechecked in 1 year.     For additional lab test information, labtestsonline.org is an excellent reference.             Thank you very much for trusting me and [facility].     Healthy regards,  Joaquin Walker MD

## 2020-02-12 ENCOUNTER — TELEPHONE (OUTPATIENT)
Dept: FAMILY MEDICINE | Facility: CLINIC | Age: 75
End: 2020-02-12

## 2020-02-12 DIAGNOSIS — E78.5 HYPERLIPIDEMIA LDL GOAL <100: ICD-10-CM

## 2020-02-13 NOTE — TELEPHONE ENCOUNTER
Attempt #1  Called patient @ [phone] - Left a non-detailed message to call back and speak with any triage nurse.    Viv Coleman RN  [facility]

## 2020-02-13 NOTE — TELEPHONE ENCOUNTER
He did not read this [employer] message so please call and give him the message.    From  Nash Walker MD To  Chivo GEOVANNI Rhoda Sent  1/30/2020  1:32 PM   Justin Waldron,     Your cholesterol is up a bit.  You can take a whole atorvastatin 10 mg daily (I have that you were taking 1/2 a tab) and be as active as possible and this can be rechecked in 6-12 months.       The glucose is just borderline elevated - diabetes is diagnosed if it is >125.  It is unlikely the it is causing any damage at that level.     Healthy regards,   Joaquin Walker MD    Previous Messages      ----- Message -----      From: Steven Duong      Sent: 1/30/2020 11:06 AM CST        To: Nash Walker MD   Subject: RE: Question about a normal test result     Two questions from the most resent blood/urine   test results.     Does the higher LDL Cholesterol require an increase in my statin meds? At what point does it actually start causing blood vasal coating?     What damage is my high Glucose causing?   Thanks   Chivo Duong

## 2020-02-13 NOTE — TELEPHONE ENCOUNTER
Pt advised of PCP note below. Pt understood and agreed with plan. Pt will double his 5 mg tabs right now and call when he is in need of refill.    Garrett Garcia RN   [facility]

## 2020-04-07 ENCOUNTER — TELEPHONE (OUTPATIENT)
Dept: FAMILY MEDICINE | Facility: CLINIC | Age: 75
End: 2020-04-07

## 2020-04-07 DIAGNOSIS — N45.1 EPIDIDYMITIS: ICD-10-CM

## 2020-04-07 DIAGNOSIS — E78.5 HYPERLIPIDEMIA LDL GOAL <100: ICD-10-CM

## 2020-04-07 NOTE — TELEPHONE ENCOUNTER
Reason for Call:  Medication or medication refill:    Do you use a [facility]?  Name of the pharmacy and phone number for the current request:  He needs his atorvastatin to go through [facility]. He needs his sulfamethoxazole-trimethoprim to go to [facility] in [place].    Name of the medication requested: atorvastatin (LIPITOR) 10 MG tablet and sulfamethoxazole-trimethoprim (BACTRIM DS) 800-160 MG tablet    Can we leave a detailed message on this number? YES    Phone number patient can be reached at: Cell number on file:    Telephone Information:   Mobile [phone]     Best Time: Anytime    Call taken on 4/7/2020 at 1:41 PM by Yanet Turner

## 2020-04-08 RX ORDER — ATORVASTATIN CALCIUM 10 MG/1
10 TABLET, FILM COATED ORAL DAILY
Qty: 90 TABLET | Refills: 3 | Status: SHIPPED | OUTPATIENT
Start: 2020-04-08 | End: 2021-02-15

## 2020-04-08 RX ORDER — SULFAMETHOXAZOLE/TRIMETHOPRIM 800-160 MG
1 TABLET ORAL 2 TIMES DAILY
Qty: 42 TABLET | Refills: 1 | Status: SHIPPED | OUTPATIENT
Start: 2020-04-08 | End: 2020-09-11

## 2020-04-08 NOTE — TELEPHONE ENCOUNTER
Ok for antibiotic refill - sent - if he is having increased epididymitis frequency the urologist recommended some procedures that my resolve the issue and he should see the urologist if interested.

## 2020-04-14 NOTE — TELEPHONE ENCOUNTER
Detailed VM left with RL note below.        GABRIELLE Hsieh, RN, PHN  [facility]  Office: [phone]  Fax: [phone]

## 2020-04-22 DIAGNOSIS — J30.2 SEASONAL ALLERGIC RHINITIS, UNSPECIFIED TRIGGER: ICD-10-CM

## 2020-04-22 RX ORDER — FLUTICASONE PROPIONATE 50 MCG
SPRAY, SUSPENSION (ML) NASAL
Qty: 48 G | Refills: 3 | OUTPATIENT
Start: 2020-04-22

## 2020-04-22 NOTE — TELEPHONE ENCOUNTER
48g x 3 refills sent on 1/28/20  Should have refills on file.     Viv Coleman RN  [facility]

## 2020-09-11 ENCOUNTER — OFFICE VISIT (OUTPATIENT)
Dept: FAMILY MEDICINE | Facility: CLINIC | Age: 75
End: 2020-09-11
Payer: MEDICARE

## 2020-09-11 VITALS
BODY MASS INDEX: 26.98 KG/M2 | TEMPERATURE: 97.8 F | HEIGHT: 68 IN | HEART RATE: 88 BPM | DIASTOLIC BLOOD PRESSURE: 76 MMHG | SYSTOLIC BLOOD PRESSURE: 135 MMHG | OXYGEN SATURATION: 99 % | WEIGHT: 178 LBS

## 2020-09-11 DIAGNOSIS — H00.015 HORDEOLUM EXTERNUM OF LEFT LOWER EYELID: Primary | ICD-10-CM

## 2020-09-11 PROCEDURE — 99213 OFFICE O/P EST LOW 20 MIN: CPT | Performed by: NURSE PRACTITIONER

## 2020-09-11 RX ORDER — ERYTHROMYCIN 5 MG/G
0.5 OINTMENT OPHTHALMIC 4 TIMES DAILY
Qty: 3.5 G | Refills: 0 | Status: SHIPPED | OUTPATIENT
Start: 2020-09-11 | End: 2020-09-18

## 2020-09-11 RX ORDER — ERYTHROMYCIN 5 MG/G
0.5 OINTMENT OPHTHALMIC 4 TIMES DAILY
Qty: 3.5 G | Refills: 0 | Status: SHIPPED | OUTPATIENT
Start: 2020-09-11 | End: 2020-09-11

## 2020-09-11 ASSESSMENT — MIFFLIN-ST. JEOR: SCORE: 1521.9

## 2020-09-11 NOTE — PROGRESS NOTES
"Subjective   Steven Duong is a 75 year old male who presents to clinic today for the following health issues:    HPI   Eye(s) Problem  Onset/Duration: x5-6 days  Description:   Location: YES- left  Pain: no - scratchy when first developing  Redness: YES  Accompanying Signs & Symptoms:  Discharge/mattering: no  Swelling: no  Visual changes: no  Fever: no  Nasal Congestion: YES- allergies  Bothered by bright lights: no  History:  Trauma: no  Foreign body exposure: no  Wearing contacts: no  Precipitating or alleviating factors: hot compress - some relief - only able to do at night  Therapies tried and outcome: see above    Reviewed and updated as needed this visit by provider:  Tobacco  Allergies  Meds  Problems  Med Hx  Surg Hx  Fam Hx         Review of Systems   Constitutional, HEENT, cardiovascular, pulmonary, GI, , musculoskeletal, neuro, skin, endocrine and psych systems are negative, except as otherwise noted in the HPI.          Objective   /76 (BP Location: Left arm, Patient Position: Chair, Cuff Size: Adult Large)   Pulse 88   Temp 97.8  F (36.6  C) (Tympanic)   Ht 1.727 m (5' 8\")   Wt 80.7 kg (178 lb)   SpO2 99%   BMI 27.06 kg/m   Body mass index is 27.06 kg/m .  Physical Exam   GENERAL: healthy, alert, well nourished, well hydrated, no distress  EYES: Eyes grossly normal to inspection, extraocular movements - intact, and PERRL, very small left lateral lower eyelid stye mildly erythematous without surrounding edema and erythema  HENT: ear canals- normal; TMs- normal; Nose- normal; Mouth- no ulcers, no lesions  NECK: no tenderness, no adenopathy, no asymmetry, no masses, no stiffness; thyroid- normal to palpation  RESP: lungs clear to auscultation - no rales, no rhonchi, no wheezes  CV: regular rates and rhythm, normal S1 S2, no S3 or S4 and no murmur, no click or rub -  ABDOMEN: soft, no tenderness, no  hepatosplenomegaly, no masses, normal bowel sounds  LYMPHATICS: ant. cervical- " "normal, post. cervical- normal, axillary- normal, supraclavicular- normal, inguinal- normal        Assessment & Plan   Steven was seen today for eye problem.    Diagnoses and all orders for this visit:    Hordeolum externum of left lower eyelid  Continue warm compress to the area approximately 4 times daily.    Antibiotic ointment 4 times a day for 7 to 10 days.    Discussed not squeezing the area to pop the stye as this can spread infection.    All surrounding tissues are within normal limits this is not felt to be a cellulitis of the surrounding tissues at this time oral antibiotics not warranted.  Did discuss with him if he develops any other symptoms including sinus infection symptoms surrounding tissue erythema and edema etc. he is to let me know for treatment with oral antibiotics.  Written education provided.  Red flag symptoms discussed and if these occur present to the emergency room or call 911.  Steven verbalizes understanding of plan of care and is in agreement.   -     Discontinue: erythromycin (ROMYCIN) 5 MG/GM ophthalmic ointment; Place 0.5 inches Into the left eye 4 times daily  -     erythromycin (ROMYCIN) 5 MG/GM ophthalmic ointment; Place 0.5 inches Into the left eye 4 times daily for 7 days    BMI:   Estimated body mass index is 26.91 kg/m  as calculated from the following:    Height as of 1/28/20: 1.727 m (5' 8\").    Weight as of 1/28/20: 80.3 kg (177 lb).       See Patient Instructions    Return if symptoms worsen or fail to improve.     Tania Gonzalez, FNP-BC     [address] [address]  [email or web address]   Office: [phone]      "

## 2020-09-11 NOTE — PATIENT INSTRUCTIONS
Patient Education     Sty (or Stye)  A sty is an infection of the oil gland of the eyelid. It may develop into a small pocket of pus (an abscess). This can cause pain, redness, and swelling. In early stages, a sty is treated with antibiotic cream, eye drops, or a small towel soaked in warm water (a warm compress). More severe cases may need to be opened and drained by a healthcare provider.  Home care    Eye drops or ointment are usually prescribed to treat the infection. Use these as directed.     Artificial tears may also be used to lubricate the eye and make it more comfortable. You can buy these over the counter without a prescription. Talk with your healthcare provider before using any over-the-counter treatment for a sty.    Apply a warm, damp towel to the affected eye for at least 5 minutes, 3 to 4 times a day for a week. Warm compresses open the pores and speed the healing. But if the compresses are too hot, they may burn your eyelid.    Sometimes the sty will drain with this treatment alone. If this happens, keep using the antibiotic until all the redness and swelling are gone.    Wash your hands before and after touching the infected eyelid to avoid spreading the infection.    Don t squeeze or try to break open the sty.  Follow-up care  Follow up with your healthcare provider, or as advised.   When to seek medical advice  Call your healthcare provider right away if any of these occur:    Increase in swelling or redness around the eyelid after 48 to 72 hours    Increase in eye pain or the eyelid blisters    Increase in warmth--the eyelid feels hot    Drainage of blood or thick pus from the sty    Blister on the eyelid    Inability to open the eyelid due to swelling    Fever of 100.4 F (38 C) or above, or as directed by your provider    Vision changes    Headache or stiff neck    The sty comes back  Date Last Reviewed: 8/1/2017 2000-2019 [employer]. [address] [facility], [place]  [address]. All rights reserved. This information is not intended as a substitute for professional medical care. Always follow your healthcare professional's instructions.

## 2020-09-21 ENCOUNTER — TELEPHONE (OUTPATIENT)
Dept: FAMILY MEDICINE | Facility: CLINIC | Age: 75
End: 2020-09-21

## 2020-09-21 NOTE — TELEPHONE ENCOUNTER
Medication Question or Refill    Who is calling: Patient    What medication are you calling about (include dose and sig)?: Cream given on 9/11 for eye    Controlled Substance Agreement on file:     Who prescribed the medication?: Tania Gonzalez NP    Do you need a refill? No    When did you use the medication last? N/A    Patient offered an appointment? No    Do you have any questions or concerns?  Yes: Cream given on 9/11 has not cleared up the stye on patient's eye. He is requesting to try a new medication.      Requested Pharm[id number] [address] AT [facility] & [facility] to leave a detailed message?: Yes at Cell number on file:    Telephone Information:   Mobile [phone]

## 2020-09-22 NOTE — TELEPHONE ENCOUNTER
Please call patient. Can he send a picture in so I can see his stye. Treatment for 10 days with ointment has been completed. Does he have any other symptoms such as sinus pressure ear pain etc?        Tania Gonzalez, YOVANYP-BC

## 2020-09-22 NOTE — TELEPHONE ENCOUNTER
Pt calling back advised of notes below. Pt will send in picture. Pt does not have any other symptoms.     Awaiting pictures.    Garrett FUENTES RN   [facility]

## 2020-09-22 NOTE — TELEPHONE ENCOUNTER
Attempt # 1  Called # [phone]     Left a non detailed VM to call back at [phone] and ask for any available Triage Nurse.    Garrett Garcia RN   [facility] - [facility]

## 2020-09-23 ENCOUNTER — TRANSFERRED RECORDS (OUTPATIENT)
Dept: HEALTH INFORMATION MANAGEMENT | Facility: CLINIC | Age: 75
End: 2020-09-23

## 2020-09-28 ENCOUNTER — OFFICE VISIT (OUTPATIENT)
Dept: FAMILY MEDICINE | Facility: CLINIC | Age: 75
End: 2020-09-28
Payer: MEDICARE

## 2020-09-28 VITALS
TEMPERATURE: 97.4 F | BODY MASS INDEX: 26.37 KG/M2 | SYSTOLIC BLOOD PRESSURE: 147 MMHG | DIASTOLIC BLOOD PRESSURE: 92 MMHG | HEART RATE: 72 BPM | WEIGHT: 174 LBS | OXYGEN SATURATION: 99 % | HEIGHT: 68 IN

## 2020-09-28 DIAGNOSIS — H00.015 HORDEOLUM EXTERNUM OF LEFT LOWER EYELID: Primary | ICD-10-CM

## 2020-09-28 PROCEDURE — 99213 OFFICE O/P EST LOW 20 MIN: CPT | Performed by: NURSE PRACTITIONER

## 2020-09-28 ASSESSMENT — MIFFLIN-ST. JEOR: SCORE: 1503.76

## 2020-09-28 NOTE — Clinical Note
Please abstract the following data from this visit with this patient into the appropriate field in Epic:    Tests that can be patient reported without a hard copy:    Other Tests found in the patient's chart through Chart Review/Care Everywhere:    [facility]  on this date: 9/23/2020--sending paper copy to be scanned    Note to [employer]: If this section is blank, no results were found via Chart Review/Care Everywhere.

## 2020-09-28 NOTE — PATIENT INSTRUCTIONS
Patient Education     Sty (or Stye)  A sty is an infection of the oil gland of the eyelid. It may develop into a small pocket of pus (an abscess). This can cause pain, redness, and swelling. In early stages, a sty is treated with antibiotic cream, eye drops, or a small towel soaked in warm water (a warm compress). More severe cases may need to be opened and drained by a healthcare provider.  Home care    Eye drops or ointment are usually prescribed to treat the infection. Use these as directed.     Artificial tears may also be used to lubricate the eye and make it more comfortable. You can buy these over the counter without a prescription. Talk with your healthcare provider before using any over-the-counter treatment for a sty.    Apply a warm, damp towel to the affected eye for at least 5 minutes, 3 to 4 times a day for a week. Warm compresses open the pores and speed the healing. But if the compresses are too hot, they may burn your eyelid.    Sometimes the sty will drain with this treatment alone. If this happens, keep using the antibiotic until all the redness and swelling are gone.    Wash your hands before and after touching the infected eyelid to avoid spreading the infection.    Don t squeeze or try to break open the sty.  Follow-up care  Follow up with your healthcare provider, or as advised.   When to seek medical advice  Call your healthcare provider right away if any of these occur:    Increase in swelling or redness around the eyelid after 48 to 72 hours    Increase in eye pain or the eyelid blisters    Increase in warmth--the eyelid feels hot    Drainage of blood or thick pus from the sty    Blister on the eyelid    Inability to open the eyelid due to swelling    Fever of 100.4 F (38 C) or above, or as directed by your provider    Vision changes    Headache or stiff neck    The sty comes back  Date Last Reviewed: 8/1/2017 2000-2019 [employer]. [address] [facility], [place]  [address]. All rights reserved. This information is not intended as a substitute for professional medical care. Always follow your healthcare professional's instructions.

## 2020-09-28 NOTE — PROGRESS NOTES
"Subjective   Steven Duong is a 75 year old male who presents to clinic today for the following health issues:    HPI     LOV 09/11/2020 - Hordeolum Externum of left lower eyelid. Does not seem any better.  Using hot compresses 2-3x per day and antibiotic cream - not seeing any changes.      He has reached out to his eye doctor and has not heard back yet when his appointment is.  He is just concerned that he has an infection and does not want to put his daughter at risk if she is immune compromise due to cancer.    Reviewed and updated as needed this visit by provider:  Tobacco  Allergies  Meds  Problems  Med Hx  Surg Hx  Fam Hx         Review of Systems   Constitutional, HEENT, cardiovascular, pulmonary, GI, , musculoskeletal, neuro, skin, endocrine and psych systems are negative, except as otherwise noted in the HPI.          Objective   BP (!) 147/92 (BP Location: Right arm, Patient Position: Chair, Cuff Size: Adult Regular)   Pulse 72   Temp 97.4  F (36.3  C) (Tympanic)   Ht 1.727 m (5' 8\")   Wt 78.9 kg (174 lb)   SpO2 99%   BMI 26.46 kg/m   Body mass index is 26.46 kg/m .  Physical Exam   GENERAL: healthy, alert, well nourished, well hydrated, no distress  EYES: Eyes grossly normal to inspection, extraocular movements - intact, and PERRL; Area is greatly improved from when I first saw him.  He has no surrounding erythema or edema or tenderness of his other tissues.  Very small area of erythema where stye was on left lower eyelid non tender.    HENT: ear canals- normal; TMs- normal; Nose- normal; Mouth- no ulcers, no lesions  NECK: no tenderness, no adenopathy, no asymmetry, no masses, no stiffness; thyroid- normal to palpation  RESP: lungs clear to auscultation - no rales, no rhonchi, no wheezes  CV: regular rates and rhythm, normal S1 S2, no S3 or S4 and no murmur, no click or rub -  ABDOMEN: soft, no tenderness, no  hepatosplenomegaly, no masses, normal bowel sounds  LYMPHATICS: ant. " cervical- normal, post. cervical- normal, axillary- normal, supraclavicular- normal, inguinal- normal      Assessment & Plan   Steven was seen today for eye problem.    Diagnoses and all orders for this visit:    Hordeolum externum of left lower eyelid  Reassurance provided.   Natural course of styes discussed.  I think it is wise for him to see an eye doctor this week prior to going to see his daughter to ensure this is the correct diagnoses and to see what other advice they can provide.    I do not see an indication for oral antibiotics at this time given his exam.  He has no pain with ocular movements of the surrounding tissues no sinus pain.  He does not need to continue the antibiotic ointment as he has done this for 10+  days.  May continue warm compress if he would like.  Red flag symptoms discussed and if these occur present to the emergency room or call 911.  Steven verbalizes understanding of plan of care and is in agreement.     See Patient Instructions    Return for eye doctor this week. .     Tania Gonzalez, YOVANYP-BC     [address] [address]  [email or web address]   Office: [phone]

## 2020-10-02 ENCOUNTER — OFFICE VISIT (OUTPATIENT)
Dept: FAMILY MEDICINE | Facility: CLINIC | Age: 75
End: 2020-10-02
Payer: MEDICARE

## 2020-10-02 VITALS
HEIGHT: 68 IN | BODY MASS INDEX: 26.67 KG/M2 | WEIGHT: 176 LBS | TEMPERATURE: 98 F | OXYGEN SATURATION: 99 % | SYSTOLIC BLOOD PRESSURE: 138 MMHG | DIASTOLIC BLOOD PRESSURE: 82 MMHG | HEART RATE: 72 BPM

## 2020-10-02 DIAGNOSIS — I10 HYPERTENSION GOAL BP (BLOOD PRESSURE) < 130/80: Primary | ICD-10-CM

## 2020-10-02 PROCEDURE — 99213 OFFICE O/P EST LOW 20 MIN: CPT | Performed by: NURSE PRACTITIONER

## 2020-10-02 RX ORDER — HYDROCHLOROTHIAZIDE 12.5 MG/1
12.5 CAPSULE ORAL DAILY
Qty: 90 CAPSULE | Refills: 0 | Status: SHIPPED | OUTPATIENT
Start: 2020-10-02 | End: 2020-12-11 | Stop reason: DRUGHIGH

## 2020-10-02 ASSESSMENT — MIFFLIN-ST. JEOR: SCORE: 1512.83

## 2020-10-02 NOTE — PROGRESS NOTES
"Subjective   Steven Duong is a 75 year old male who presents to clinic today for the following health issues:    HPI   Hypertension Follow-up      Do you check your blood pressure regularly outside of the clinic? Yes     Are you following a low salt diet? Yes    Are your blood pressures ever more than 140 on the top number (systolic) OR more   than 90 on the bottom number (diastolic), for example 140/90? No      How many servings of fruits and vegetables do you eat daily?  2-3    On average, how many sweetened beverages do you drink each day (Examples: soda, juice, sweet tea, etc.  Do NOT count diet or artificially sweetened beverages)?   0    How many days per week do you exercise enough to make your heart beat faster? 5    How many minutes a day do you exercise enough to make your heart beat faster? 30 - 60    How many days per week do you miss taking your medication? 0    Patient is having some blood pressure readings at home of 160s to 170s over 90.  Blood pressure pretty stable today in clinic. Previously was on a diuretic which decreased his potassium so he was started on potassium supplementation and still is on this.  Recent potassium within normal limits.  Denies chest pain or shortness of breath.    BP Readings from Last 3 Encounters:   10/02/20 138/82   09/28/20 (!) 147/92   09/11/20 135/76     Potassium   Date Value Ref Range Status   01/28/2020 4.6 3.4 - 5.3 mmol/L Final     Reviewed and updated as needed this visit by provider:  Tobacco  Allergies  Meds  Problems  Med Hx  Surg Hx  Fam Hx          Review of Systems   Constitutional, HEENT, cardiovascular, pulmonary, GI, , musculoskeletal, neuro, skin, endocrine and psych systems are negative, except as otherwise noted in the HPI.          Objective   /82   Pulse 72   Temp 98  F (36.7  C) (Tympanic)   Ht 1.727 m (5' 8\")   Wt 79.8 kg (176 lb)   SpO2 99%   BMI 26.76 kg/m   Body mass index is 26.76 kg/m .  Physical Exam   GENERAL: " healthy, alert, well nourished, well hydrated, no distress  RESP: lungs clear to auscultation - no rales, no rhonchi, no wheezes  CV: regular rates and rhythm, normal S1 S2, no S3 or S4 and no murmur, no click or rub -  ABDOMEN: soft, no tenderness, no  hepatosplenomegaly, no masses, normal bowel sounds        Assessment & Plan   Steven was seen today for hypertension.    Diagnoses and all orders for this visit:    Hypertension goal BP (blood pressure) < 130/80  Will start low-dose hydrochlorothiazide.  We will also check his blood pressure and electrolytes in 2 weeks.  Encouraged him to bring his blood pressure cuff with him at his next office visit to calibrate and ensure it is accurate.  Red flag symptoms discussed and if these occur present to the emergency room or call 911.  Steven verbalizes understanding of plan of care and is in agreement.   -     hydrochlorothiazide (MICROZIDE) 12.5 MG capsule; Take 1 capsule (12.5 mg) by mouth daily  -     Basic metabolic panel  (Ca, Cl, CO2, Creat, Gluc, K, Na, BUN); Future    See Patient Instructions    Return in about 2 weeks (around 10/16/2020) for Blood pressure recheck, Lab only appointment.     Tania Gonzalez, [facility] [address]  [email or web address]   Office: [phone]

## 2020-10-09 ENCOUNTER — MYC MEDICAL ADVICE (OUTPATIENT)
Dept: FAMILY MEDICINE | Facility: CLINIC | Age: 75
End: 2020-10-09

## 2020-10-09 DIAGNOSIS — E78.5 HYPERLIPIDEMIA LDL GOAL <130: Primary | ICD-10-CM

## 2020-10-09 NOTE — TELEPHONE ENCOUNTER
Please see My Chart message below and advise as appropriate.  Patient has a BMP ordered but also just had these labs checked on 1/28/2020.    GABRIELLE FitchN, RN  [facility]

## 2020-10-12 NOTE — TELEPHONE ENCOUNTER
He is scheduled for a BMP for his recent BP medication change. This includes glucose and sodium.     I have added on a Lipid panel.  He will need to come fasting.     Thanks,     RAZIA Dunn

## 2020-10-12 NOTE — ADDENDUM NOTE
----- Message from Saurav Arambula sent at 10/12/2020 11:04 AM CDT -----  Regarding: pregnancy  Type:  Needs Medical Advice    Who Called: patient   Reason: patient took a home pregnancy test and it came out positive. Patient would like an appt to be seen this week.   Would the patient rather a call back or a response via [facility]? Call back   Best Call Back Number: [phone]  Additional Information: m/a       Addended by: CAROLINE SYED on: 8/19/2019 11:16 AM     Modules accepted: Orders

## 2020-10-15 ENCOUNTER — ALLIED HEALTH/NURSE VISIT (OUTPATIENT)
Dept: NURSING | Facility: CLINIC | Age: 75
End: 2020-10-15
Payer: MEDICARE

## 2020-10-15 VITALS
RESPIRATION RATE: 16 BRPM | SYSTOLIC BLOOD PRESSURE: 132 MMHG | DIASTOLIC BLOOD PRESSURE: 78 MMHG | HEART RATE: 66 BPM | OXYGEN SATURATION: 97 %

## 2020-10-15 DIAGNOSIS — R73.03 PREDIABETES: ICD-10-CM

## 2020-10-15 DIAGNOSIS — Z01.30 BP CHECK: ICD-10-CM

## 2020-10-15 DIAGNOSIS — I10 HYPERTENSION GOAL BP (BLOOD PRESSURE) < 130/80: ICD-10-CM

## 2020-10-15 DIAGNOSIS — I10 HYPERTENSION: Primary | ICD-10-CM

## 2020-10-15 DIAGNOSIS — E78.5 HYPERLIPIDEMIA LDL GOAL <130: ICD-10-CM

## 2020-10-15 LAB
ANION GAP SERPL CALCULATED.3IONS-SCNC: 10 MMOL/L (ref 3–14)
BUN SERPL-MCNC: 12 MG/DL (ref 7–30)
CALCIUM SERPL-MCNC: 9.2 MG/DL (ref 8.5–10.1)
CHLORIDE SERPL-SCNC: 103 MMOL/L (ref 94–109)
CHOLEST SERPL-MCNC: 156 MG/DL
CO2 SERPL-SCNC: 24 MMOL/L (ref 20–32)
CREAT SERPL-MCNC: 1.04 MG/DL (ref 0.66–1.25)
GFR SERPL CREATININE-BSD FRML MDRD: 70 ML/MIN/{1.73_M2}
GLUCOSE SERPL-MCNC: 109 MG/DL (ref 70–99)
HDLC SERPL-MCNC: 64 MG/DL
LDLC SERPL CALC-MCNC: 80 MG/DL
NONHDLC SERPL-MCNC: 92 MG/DL
POTASSIUM SERPL-SCNC: 4.3 MMOL/L (ref 3.4–5.3)
SODIUM SERPL-SCNC: 137 MMOL/L (ref 133–144)
TRIGL SERPL-MCNC: 62 MG/DL

## 2020-10-15 PROCEDURE — 80048 BASIC METABOLIC PNL TOTAL CA: CPT | Performed by: NURSE PRACTITIONER

## 2020-10-15 PROCEDURE — 80061 LIPID PANEL: CPT | Performed by: NURSE PRACTITIONER

## 2020-10-15 PROCEDURE — 36415 COLL VENOUS BLD VENIPUNCTURE: CPT | Performed by: NURSE PRACTITIONER

## 2020-10-15 ASSESSMENT — PAIN SCALES - GENERAL: PAINLEVEL: NO PAIN (0)

## 2020-10-15 NOTE — PROGRESS NOTES
Steven GEOVANNI Duong is being followed for Blood Pressure management.      BP Readings from Last 3 Encounters:   10/15/20 132/78   10/02/20 138/82   09/28/20 (!) 147/92       Wt Readings from Last 2 Encounters:   10/02/20 79.8 kg (176 lb)   09/28/20 78.9 kg (174 lb)       Is pulse 55 or greater? - Yes    Pulse Readings from Last 1 Encounters:   10/15/20 66       Current blood pressure medication(s):  Current Outpatient Medications   Medication Sig Dispense Refill     atorvastatin (LIPITOR) 10 MG tablet Take 1 tablet (10 mg) by mouth daily 90 tablet 3     cholecalciferol (VITAMIN D3) 1000 UNIT tablet Take 1 tablet (1,000 Units) by mouth daily 30 tablet      co-enzyme Q-10 100 MG CAPS capsule Take by mouth daily       fluticasone (FLONASE) 50 MCG/ACT nasal spray Spray 1-2 sprays into both nostrils daily 48 g 3     hydrochlorothiazide (MICROZIDE) 12.5 MG capsule Take 1 capsule (12.5 mg) by mouth daily 90 capsule 0     losartan (COZAAR) 100 MG tablet Take 1 tablet (100 mg) by mouth daily 90 tablet 3     LUTEIN PO        Multiple Vitamin (MULTI-VITAMINS) TABS        potassium chloride ER (KLOR-CON) 20 MEQ CR tablet Take 1 tablet (20 mEq) by mouth daily 90 tablet 3          1. Follow up instructions include:     Next Provider visit: Follow up in 3 months..      SUBJECTIVE:                                                    The patient is taking medication as prescribed and is tolerating well.   Patient is monitoring Blood Pressure at home.   Last 3 home readings     Hypertension goal BP (blood pressure) < 130/80  Will start low-dose hydrochlorothiazide.  We will also check his blood pressure and electrolytes in 2 weeks.  Encouraged him to bring his blood pressure cuff with him at his next office visit to calibrate and ensure it is accurate.  Red flag symptoms discussed and if these occur present to the emergency room or call 911.  Steven verbalizes understanding of plan of care and is in agreement.   -      hydrochlorothiazide (MICROZIDE) 12.5 MG capsule; Take 1 capsule (12.5 mg) by mouth daily  -     Basic metabolic panel  (Ca, Cl, CO2, Creat, Gluc, K, Na, BUN); Future    Out of the following complicating factors: Cough, Headache, Lightheadedness, Shortness of breath, Fatigue, Nausea, Sexual Dysfunction, New onset of swelling or edema, Weakness and New onset of Chest Pain, the patient reports:  None    OBJECTIVE:                                                      Today's BP completed using cuff size: regular on left side  arm.      Potassium   Date Value Ref Range Status   01/28/2020 4.6 3.4 - 5.3 mmol/L Final     Creatinine   Date Value Ref Range Status   01/28/2020 1.16 0.66 - 1.25 mg/dL Final     Urea Nitrogen   Date Value Ref Range Status   01/28/2020 15 7 - 30 mg/dL Final     GFR Estimate   Date Value Ref Range Status   01/28/2020 62 >60 mL/min/[1.73_m2] Final     Comment:     Non  GFR Calc  Starting 12/18/2018, serum creatinine based estimated GFR (eGFR) will be   calculated using the Chronic Kidney Disease Epidemiology Collaboration   (CKD-EPI) equation.       Pt coming in for BP recheck. Pt did bring own cuff. Pt had compared the manual reading to the automatic cuff noting automatic is within 6 points on systolic and 7 points on diastolic. 138/85  Pt then took reading on right arm which resulted a high reading of 159/96.     Routing to PCP to review and advise if needed.    Education:  general discussion/verbal explanation  Ways to help improve BP/HTN:   Medications  Lose weight  Diet low in fat and rich in fruits, vegetables and low fat dairy products  Reduce salt in diet  Do something active for at least 30 minutes a day on most days of the week  Cut down on alcohol (if you drink more than 2 drinks per day)  Decrease stress (exercise, read, yoga, meditation, time for self, etc.)   Patient was given an opportunity to ask questions.    Patient verbalized understanding of this plan and is  agreeable.    Garrett Garcia RN

## 2020-10-16 NOTE — RESULT ENCOUNTER NOTE
Dear Chivo,    Here is a summary of your recent test results:    -Cholesterol levels are at your goal levels.  ADVISE: continuing your medication, a regular exercise program with at least 150 minutes of aerobic exercise per week, and eating a low saturated fat/low carbohydrate diet.  Also, you should recheck this fasting cholesterol panel in 12 months.  -Kidney function (GFR) is normal.  -Sodium is normal.  -Potassium is normal.  -Calcium is normal.  -Glucose is slight elevated and may be a sign of early diabetes (prediabetes). ADVISE:: eating a low carbohydrate diet, exercising, trying to lose weight (if necessary) and rechecking your glucose level in 12 months.    For additional lab test information, labtestsonline.org is an excellent reference.    In addition, here is a list of due or overdue Health Maintenance reminders:    There are no preventive care reminders to display for this patient.    Please call us at [phone] (or use [email or web address]) to address the above recommendations if needed.    Thank you for choosing  [facility].  It was an honor and a privilege to participate in your care.       Healthy regards,    Tania Gonzalez, RAZIA  [facility]

## 2020-11-25 ENCOUNTER — MYC MEDICAL ADVICE (OUTPATIENT)
Dept: FAMILY MEDICINE | Facility: CLINIC | Age: 75
End: 2020-11-25

## 2020-11-25 DIAGNOSIS — I10 HYPERTENSION GOAL BP (BLOOD PRESSURE) < 130/80: Primary | ICD-10-CM

## 2020-11-25 DIAGNOSIS — Z51.81 MEDICATION MONITORING ENCOUNTER: ICD-10-CM

## 2020-11-25 DIAGNOSIS — Z86.39 HISTORY OF LOW POTASSIUM: ICD-10-CM

## 2020-11-27 RX ORDER — HYDROCHLOROTHIAZIDE 25 MG/1
25 TABLET ORAL DAILY
Qty: 90 TABLET | Refills: 3 | Status: SHIPPED | OUTPATIENT
Start: 2020-11-27 | End: 2020-11-27

## 2020-11-27 RX ORDER — HYDROCHLOROTHIAZIDE 25 MG/1
25 TABLET ORAL DAILY
Qty: 90 TABLET | Refills: 3 | Status: SHIPPED | OUTPATIENT
Start: 2020-11-27 | End: 2021-02-15

## 2020-11-27 NOTE — TELEPHONE ENCOUNTER
Called # [phone]     Pt called and advised of note below.   Next 5 appointments (look out 90 days)    Dec 11, 2020  1:00 PM  Nurse Only with RV RN VISITS  [facility] [address] [place] [address] [facility] [id number] [phone]        Pt requested Rx to go to [facility], Changed to reflect request.      Garrett Garcia RN   [facility] [place] Triage

## 2020-11-27 NOTE — TELEPHONE ENCOUNTER
Routing to PCP to review [employer] message and advise.    Garrett FUENTES RN   [facility]

## 2020-11-27 NOTE — TELEPHONE ENCOUNTER
Okay to increase hydrochlorothiazide to 25 mg daily. I will send a new prescription for this to [facility].     Follow up electrolytes and nurse BP within the next 2 weeks please help him schedule this.     Encourage him to bring his BP cuff to the nurse only BP visit to ensure his at home BP cuff is accurate (if he hasn't already done so).    Any red flag symptoms with his BP elevated he should be seen.     Thanks,       Tania Gonzalez, FNP-BC

## 2020-11-27 NOTE — TELEPHONE ENCOUNTER
Pt called because he didn't here anything back yet for the the message he sent through my chart regarding kind of high BP     Per pt he check today and it was 161/86     pls call and advice     Marin Rao

## 2020-11-29 ENCOUNTER — MYC MEDICAL ADVICE (OUTPATIENT)
Dept: FAMILY MEDICINE | Facility: CLINIC | Age: 75
End: 2020-11-29

## 2020-11-29 DIAGNOSIS — R53.83 FATIGUE, UNSPECIFIED TYPE: Primary | ICD-10-CM

## 2020-11-30 ENCOUNTER — MYC MEDICAL ADVICE (OUTPATIENT)
Dept: FAMILY MEDICINE | Facility: CLINIC | Age: 75
End: 2020-11-30

## 2020-11-30 NOTE — TELEPHONE ENCOUNTER
Please see my chart message below     Please review and advise     Thank you     Brisa Silveira RN, BSN  [place] Triage

## 2020-12-11 ENCOUNTER — ALLIED HEALTH/NURSE VISIT (OUTPATIENT)
Dept: NURSING | Facility: CLINIC | Age: 75
End: 2020-12-11
Payer: MEDICARE

## 2020-12-11 ENCOUNTER — MYC MEDICAL ADVICE (OUTPATIENT)
Dept: FAMILY MEDICINE | Facility: CLINIC | Age: 75
End: 2020-12-11

## 2020-12-11 VITALS
HEART RATE: 80 BPM | RESPIRATION RATE: 16 BRPM | WEIGHT: 172 LBS | BODY MASS INDEX: 26.15 KG/M2 | DIASTOLIC BLOOD PRESSURE: 72 MMHG | SYSTOLIC BLOOD PRESSURE: 116 MMHG

## 2020-12-11 DIAGNOSIS — R53.83 FATIGUE, UNSPECIFIED TYPE: ICD-10-CM

## 2020-12-11 DIAGNOSIS — Z86.39 HISTORY OF LOW POTASSIUM: ICD-10-CM

## 2020-12-11 DIAGNOSIS — I10 HYPERTENSION GOAL BP (BLOOD PRESSURE) < 130/80: ICD-10-CM

## 2020-12-11 DIAGNOSIS — Z51.81 MEDICATION MONITORING ENCOUNTER: ICD-10-CM

## 2020-12-11 DIAGNOSIS — Z01.30 BP CHECK: ICD-10-CM

## 2020-12-11 DIAGNOSIS — I10 HYPERTENSION GOAL BP (BLOOD PRESSURE) < 130/80: Primary | ICD-10-CM

## 2020-12-11 DIAGNOSIS — R73.03 PREDIABETES: ICD-10-CM

## 2020-12-11 LAB
ANION GAP SERPL CALCULATED.3IONS-SCNC: 5 MMOL/L (ref 3–14)
BUN SERPL-MCNC: 22 MG/DL (ref 7–30)
CALCIUM SERPL-MCNC: 9 MG/DL (ref 8.5–10.1)
CHLORIDE SERPL-SCNC: 97 MMOL/L (ref 94–109)
CO2 SERPL-SCNC: 27 MMOL/L (ref 20–32)
CREAT SERPL-MCNC: 1.09 MG/DL (ref 0.66–1.25)
ERYTHROCYTE [DISTWIDTH] IN BLOOD BY AUTOMATED COUNT: 11.9 % (ref 10–15)
GFR SERPL CREATININE-BSD FRML MDRD: 66 ML/MIN/{1.73_M2}
GLUCOSE SERPL-MCNC: 120 MG/DL (ref 70–99)
HCT VFR BLD AUTO: 48.5 % (ref 40–53)
HGB BLD-MCNC: 16.9 G/DL (ref 13.3–17.7)
MCH RBC QN AUTO: 31.1 PG (ref 26.5–33)
MCHC RBC AUTO-ENTMCNC: 34.8 G/DL (ref 31.5–36.5)
MCV RBC AUTO: 89 FL (ref 78–100)
PLATELET # BLD AUTO: 194 10E9/L (ref 150–450)
POTASSIUM SERPL-SCNC: 3.9 MMOL/L (ref 3.4–5.3)
RBC # BLD AUTO: 5.44 10E12/L (ref 4.4–5.9)
SODIUM SERPL-SCNC: 129 MMOL/L (ref 133–144)
TSH SERPL DL<=0.005 MIU/L-ACNC: 1.12 MU/L (ref 0.4–4)
WBC # BLD AUTO: 7.4 10E9/L (ref 4–11)

## 2020-12-11 PROCEDURE — 36415 COLL VENOUS BLD VENIPUNCTURE: CPT | Performed by: NURSE PRACTITIONER

## 2020-12-11 PROCEDURE — 84443 ASSAY THYROID STIM HORMONE: CPT | Performed by: NURSE PRACTITIONER

## 2020-12-11 PROCEDURE — 80048 BASIC METABOLIC PNL TOTAL CA: CPT | Performed by: NURSE PRACTITIONER

## 2020-12-11 PROCEDURE — 85027 COMPLETE CBC AUTOMATED: CPT | Performed by: NURSE PRACTITIONER

## 2020-12-11 NOTE — PROGRESS NOTES
Steven Duong is being followed for Blood Pressure management.      BP Readings from Last 3 Encounters:   12/11/20 116/72   10/15/20 132/78   10/02/20 138/82       Wt Readings from Last 2 Encounters:   12/11/20 78 kg (172 lb)   10/02/20 79.8 kg (176 lb)       Is pulse 55 or greater? - Yes    Pulse Readings from Last 1 Encounters:   12/11/20 80       Current blood pressure medication(s):  Current Outpatient Medications   Medication Sig Dispense Refill     atorvastatin (LIPITOR) 10 MG tablet Take 1 tablet (10 mg) by mouth daily 90 tablet 3     cholecalciferol (VITAMIN D3) 1000 UNIT tablet Take 1 tablet (1,000 Units) by mouth daily 30 tablet      co-enzyme Q-10 100 MG CAPS capsule Take by mouth daily       fluticasone (FLONASE) 50 MCG/ACT nasal spray Spray 1-2 sprays into both nostrils daily 48 g 3     hydrochlorothiazide (HYDRODIURIL) 25 MG tablet Take 1 tablet (25 mg) by mouth daily 90 tablet 3     losartan (COZAAR) 100 MG tablet Take 1 tablet (100 mg) by mouth daily 90 tablet 3     LUTEIN PO        Multiple Vitamin (MULTI-VITAMINS) TABS        potassium chloride ER (KLOR-CON) 20 MEQ CR tablet Take 1 tablet (20 mEq) by mouth daily 90 tablet 3          1. Follow up instructions include:     Next Provider visit: Follow up in 3 months..      SUBJECTIVE:                                                    The patient is taking medication as prescribed and is tolerating well.   Patient is monitoring Blood Pressure at home.   Last 3 home readings     130's/80's    Out of the following complicating factors: Cough, Headache, Lightheadedness, Shortness of breath, Fatigue, Nausea, Sexual Dysfunction, New onset of swelling or edema, Weakness and New onset of Chest Pain, the patient reports:  None    OBJECTIVE:                                                      Today's BP completed using cuff size: regular on right side arm.      Potassium   Date Value Ref Range Status   10/15/2020 4.3 3.4 - 5.3 mmol/L Final     Creatinine    Date Value Ref Range Status   10/15/2020 1.04 0.66 - 1.25 mg/dL Final     Urea Nitrogen   Date Value Ref Range Status   10/15/2020 12 7 - 30 mg/dL Final     GFR Estimate   Date Value Ref Range Status   10/15/2020 70 >60 mL/min/[1.73_m2] Final     Comment:     Non  GFR Calc  Starting 12/18/2018, serum creatinine based estimated GFR (eGFR) will be   calculated using the Chronic Kidney Disease Epidemiology Collaboration   (CKD-EPI) equation.           Education:  general discussion/verbal explanation  Ways to help improve BP/HTN:   Medications  Lose weight  Diet low in fat and rich in fruits, vegetables and low fat dairy products  Reduce salt in diet  Do something active for at least 30 minutes a day on most days of the week  Cut down on alcohol (if you drink more than 2 drinks per day)  Decrease stress (exercise, read, yoga, meditation, time for self, etc.)   Patient was given an opportunity to ask questions.    Patient verbalized understanding of this plan and is agreeable.    Garrett Garcia RN

## 2020-12-14 NOTE — TELEPHONE ENCOUNTER
[email or web address] message sent.    Garrett FUENTES RN   [facility]

## 2020-12-15 ENCOUNTER — MYC MEDICAL ADVICE (OUTPATIENT)
Dept: FAMILY MEDICINE | Facility: CLINIC | Age: 75
End: 2020-12-15

## 2020-12-16 ENCOUNTER — MYC MEDICAL ADVICE (OUTPATIENT)
Dept: FAMILY MEDICINE | Facility: CLINIC | Age: 75
End: 2020-12-16

## 2020-12-16 DIAGNOSIS — E78.5 HYPERLIPIDEMIA LDL GOAL <130: Primary | ICD-10-CM

## 2020-12-16 DIAGNOSIS — R73.09 HIGH GLUCOSE: ICD-10-CM

## 2020-12-16 DIAGNOSIS — E87.1 LOW SODIUM LEVELS: Primary | ICD-10-CM

## 2020-12-16 NOTE — TELEPHONE ENCOUNTER
Orders placed per result note.  [employer] message sent.    Garrett FUENTES RN   [facility]

## 2020-12-16 NOTE — RESULT ENCOUNTER NOTE
Dear Trinidad Waldron for the delay.  Results automatically release quickly to patient charts prior to us even reviewing them to advise.  I have reviewed your labs and here is a summary of your recent test results:    -Kidney function (GFR) is normal.  -Sodium is decreased.  ADVISE: We need to keep close watch of this.  If this continues to be low it could be from your diuretic increase so we may need to decrease that.  I encourage you to not over hydrate with water daily.  Recheck this in 1 month.  -Potassium is normal.  Continue your same medication regimen for this.  -Calcium is normal.  -Glucose is mildly elevated; if this is nonfasting this is perfectly normal if you were fasting that day please let me know.  We will recheck your sugar with your next sodium check please be fasting for that lab draw. The only time it is going to be completely accurate is if it is fasting.     Thyroid and blood counts are normal.    For additional lab test information, [email or web address].org is an excellent reference.    In addition, here is a list of due or overdue Health Maintenance reminders:    ANNUAL REVIEW OF  ORDERS due on 1946    Please call us at [phone] (or use [employer]) to address the above recommendations if needed.    [facility]  It was an honor and a privilege to participate in your care.       Healthy regards,    Tania Gonzalez, RAZIA  [facility]

## 2020-12-16 NOTE — TELEPHONE ENCOUNTER
[employer] message sent.    Garrett FUENTES RN   [facility]

## 2020-12-21 ENCOUNTER — MYC MEDICAL ADVICE (OUTPATIENT)
Dept: FAMILY MEDICINE | Facility: CLINIC | Age: 75
End: 2020-12-21

## 2020-12-21 NOTE — PROGRESS NOTES
{Provider Assessment And Plan:032993}  Subjective   Steven Duong is a 75 year old male who is being evaluated via a billable video visit today for the following health issues:      Patient has given verbal consent for Video visit? Yes    Will anyone else be joining your video visit? No    {If patient encounters technical issues they should call [phone] :[id number]}    Video Start Time: {video visit start time for provider to select:051504}    Chief Complaint  Patient presents with:  Musculoskeletal Problem       HPI   Musculoskeletal problem/pain  Onset/Duration: ongoing x 2 years  Now the last 2-3 week he has had some redness and pain of the toes - mainly the 2nd toes bilaterally  Description  Location: foot and fingers - bilateral  Joint Swelling: YES- swelling and redness of finger and toes tips, pt suspicious of arthritis.   Redness: YES  Pain: YES- tenderness   Warmth: no, fingers and toes a usually cold     Occasional whiteness of the toes    Intensity:  moderate, severe  Progression of Symptoms:  Worsening, intermittent  Accompanying signs and symptoms:   Fevers: no  Numbness/tingling/weakness: YES- mild numbness   History  Trauma to the area: no, pt reports having a pedicure recently, redness of the toe started shortly after, unsure if this is related.    Gout history: ~20 years ago  Recent illness:  no  Previous similar problem: no  Previous evaluation:  No    Hands are stiff too.     Precipitating or alleviating factors:  Aggravating factors include: none  Therapies tried and outcome: socks and antibiotic cream.     No claudication.     Reviewed and updated as needed this visit by Provider  Tobacco  Allergies  Meds  Problems  Med Hx  Surg Hx  Fam Hx          ROS: Constitutional, HEENT, cardiovascular, pulmonary, GI, , musculoskeletal, neuro, skin, endocrine and psych systems are negative, except as otherwise noted.       Objective   Reported vitals:  There were no vitals taken for this visit.  "  GENERAL: Healthy, alert and no distress  EYES: Eyes grossly normal to inspection.  No discharge or erythema, or obvious scleral/conjunctival abnormalities.  RESP: No audible wheeze, cough, or visible cyanosis.  No visible retractions or increased work of breathing.    SKIN: Visible skin clear. No significant rash, abnormal pigmentation or lesions.  NEURO: Cranial nerves grossly intact.  Mentation and speech appropriate for age.  PSYCH: Mentation appears normal, affect normal/bright, judgement and insight intact, normal speech and appearance well-groomed.    Diagnostic Test Results:  Labs reviewed in Epic      Video-Visit Details    Type of service:  Video Visit    Video End Time (time video stopped): {video visit stop time for provider to select:420366}    Originating Location (pt. Location): {video visit patient location:835963::\"Home\"}    Distant Location (provider location):  [facility]     Platform used for Video Visit: {Virtual Visit Platforms:070092::\"[employer]\"}          "

## 2020-12-22 ENCOUNTER — MYC MEDICAL ADVICE (OUTPATIENT)
Dept: FAMILY MEDICINE | Facility: CLINIC | Age: 75
End: 2020-12-22

## 2020-12-22 ENCOUNTER — VIRTUAL VISIT (OUTPATIENT)
Dept: FAMILY MEDICINE | Facility: CLINIC | Age: 75
End: 2020-12-22
Payer: MEDICARE

## 2020-12-22 DIAGNOSIS — M25.50 ARTHRALGIA, UNSPECIFIED JOINT: Primary | ICD-10-CM

## 2020-12-22 DIAGNOSIS — C61 PROSTATE CANCER (H): ICD-10-CM

## 2020-12-22 PROCEDURE — 99442 PR PHYSICIAN TELEPHONE EVALUATION 11-20 MIN: CPT | Mod: 95 | Performed by: FAMILY MEDICINE

## 2020-12-22 RX ORDER — NAPROXEN SODIUM 220 MG
440 TABLET ORAL 2 TIMES DAILY WITH MEALS
Qty: 28 TABLET | Refills: 0 | COMMUNITY
Start: 2020-12-22 | End: 2020-12-29

## 2020-12-22 NOTE — PROGRESS NOTES
Assessment & Plan   Arthralgia, unspecified joint  Toe pains lately.  Possible gout - will check labs  - **Uric acid FUTURE 2mo  - naproxen sodium (ALEVE) 220 MG tablet  Dispense: 28 tablet; Refill: 0  - CBC with platelets and differential  - Rheumatoid factor  - ESR: Erythrocyte sedimentation rate  - CRP, inflammation  - Anti Nuclear Manisha IgG by IFA with Reflex    h/o prostate cancer (H) - S/p prostatectomy at 56 yo (2001)  Due for labs coming up  - PSA, tumor marker       Return in about 2 weeks (around 1/5/2021) for symptoms failing to improve or sooner if worsening.      Joaquin Walker MD      [address] [address]  [place]   Office: [phone]       Subjective   Steven Duong is a 75 year old male who is being evaluated via a billable telephone visit today for the following health issues:    Patient has given verbal consent for Telephone visit?  Yes    How would you like to obtain your AVS? MyChart    Chief Complaint  Patient presents with:  Musculoskeletal Problem       HPI    HPI   Musculoskeletal problem/pain  Onset/Duration: ongoing x 2 years  Now the last 2-3 week he has had some redness and pain of the toes - mainly the 2nd toes bilaterally  Description  Location: foot and fingers - bilateral  Joint Swelling: YES- swelling and redness of finger and toes tips, pt suspicious of arthritis.   Redness: YES  Pain: YES- tenderness   Warmth: no, fingers and toes a usually cold     Occasional whiteness of the toes    Intensity:  moderate, severe  Progression of Symptoms:  Worsening, intermittent  Accompanying signs and symptoms:   Fevers: no  Numbness/tingling/weakness: YES- mild numbness   History  Trauma to the area: no, pt reports having a pedicure recently, redness of the toe started shortly after, unsure if this is related.    Gout history: ~20 years ago  Recent illness:  no  Previous similar problem: no  Previous evaluation:  No    Hands are  stiff too.     Precipitating or alleviating factors:  Aggravating factors include: none  Therapies tried and outcome: socks and antibiotic cream.     No claudication.       Reviewed and updated as needed this visit by Provider  Tobacco  Allergies  Meds  Problems  Med Hx  Surg Hx  Fam Hx          ROS: Constitutional, HEENT, cardiovascular, pulmonary, GI, , musculoskeletal, neuro, skin, endocrine and psych systems are negative, except as otherwise noted.       Objective   Reported vitals:  There were no vitals taken for this visit.   Gen: healthy, alert, and no distress  Psych: Alert and oriented times 3; coherent speech, normal   rate and volume, able to articulate logical thoughts, able   to abstract reason, no tangential thoughts, no hallucinations   or delusions.  His affect is normal.    Reviewed pictures of his toes that hs sent in and the 2nd toes have redness around the DIP, no open skin.     Finger picture shows a healing circular just proximal to the nail - ? Mucous cyst vs other.     Diagnostic Test Results:  Labs reviewed in Epic    Phone call duration:  20 minutes

## 2020-12-22 NOTE — TELEPHONE ENCOUNTER
Pt had VV today will forward to PCP for NEVA FUENTES RN   [facility] [place]

## 2020-12-22 NOTE — TELEPHONE ENCOUNTER
My chart message sent     Please help him schedule a lab only appointment     Thank you     Brisa Silveira RN, BSN  [facility]

## 2020-12-22 NOTE — TELEPHONE ENCOUNTER
Patient has VV with PCP @ 1140 today  Routing to PCP for further review/recommendations/orders.      Viv Coleman RN  [facility]

## 2020-12-23 DIAGNOSIS — C61 PROSTATE CANCER (H): ICD-10-CM

## 2020-12-23 DIAGNOSIS — M25.50 ARTHRALGIA, UNSPECIFIED JOINT: ICD-10-CM

## 2020-12-23 LAB
BASOPHILS # BLD AUTO: 0 10E9/L (ref 0–0.2)
BASOPHILS NFR BLD AUTO: 0.4 %
DIFFERENTIAL METHOD BLD: NORMAL
EOSINOPHIL # BLD AUTO: 0.1 10E9/L (ref 0–0.7)
EOSINOPHIL NFR BLD AUTO: 0.7 %
ERYTHROCYTE [DISTWIDTH] IN BLOOD BY AUTOMATED COUNT: 11.9 % (ref 10–15)
ERYTHROCYTE [SEDIMENTATION RATE] IN BLOOD BY WESTERGREN METHOD: 4 MM/H (ref 0–20)
HCT VFR BLD AUTO: 48.8 % (ref 40–53)
HGB BLD-MCNC: 16.9 G/DL (ref 13.3–17.7)
LYMPHOCYTES # BLD AUTO: 2.5 10E9/L (ref 0.8–5.3)
LYMPHOCYTES NFR BLD AUTO: 30.7 %
MCH RBC QN AUTO: 31 PG (ref 26.5–33)
MCHC RBC AUTO-ENTMCNC: 34.6 G/DL (ref 31.5–36.5)
MCV RBC AUTO: 89 FL (ref 78–100)
MONOCYTES # BLD AUTO: 0.7 10E9/L (ref 0–1.3)
MONOCYTES NFR BLD AUTO: 8.2 %
NEUTROPHILS # BLD AUTO: 4.9 10E9/L (ref 1.6–8.3)
NEUTROPHILS NFR BLD AUTO: 60 %
PLATELET # BLD AUTO: 191 10E9/L (ref 150–450)
RBC # BLD AUTO: 5.46 10E12/L (ref 4.4–5.9)
WBC # BLD AUTO: 8.2 10E9/L (ref 4–11)

## 2020-12-23 PROCEDURE — 36415 COLL VENOUS BLD VENIPUNCTURE: CPT | Performed by: FAMILY MEDICINE

## 2020-12-23 PROCEDURE — 86038 ANTINUCLEAR ANTIBODIES: CPT | Performed by: FAMILY MEDICINE

## 2020-12-23 PROCEDURE — 84153 ASSAY OF PSA TOTAL: CPT | Performed by: FAMILY MEDICINE

## 2020-12-23 PROCEDURE — 86431 RHEUMATOID FACTOR QUANT: CPT | Performed by: FAMILY MEDICINE

## 2020-12-23 PROCEDURE — 84550 ASSAY OF BLOOD/URIC ACID: CPT | Performed by: FAMILY MEDICINE

## 2020-12-23 PROCEDURE — 85652 RBC SED RATE AUTOMATED: CPT | Performed by: FAMILY MEDICINE

## 2020-12-23 PROCEDURE — 86039 ANTINUCLEAR ANTIBODIES (ANA): CPT | Performed by: FAMILY MEDICINE

## 2020-12-23 PROCEDURE — 86140 C-REACTIVE PROTEIN: CPT | Performed by: FAMILY MEDICINE

## 2020-12-23 PROCEDURE — 85025 COMPLETE CBC W/AUTO DIFF WBC: CPT | Performed by: FAMILY MEDICINE

## 2020-12-24 LAB — CRP SERPL-MCNC: <2.9 MG/L (ref 0–8)

## 2020-12-25 LAB
PSA SERPL-MCNC: 0.02 UG/L (ref 0–4)
URATE SERPL-MCNC: 3.4 MG/DL (ref 3.5–7.2)

## 2020-12-28 LAB
ANA PAT SER IF-IMP: ABNORMAL
ANA SER QL IF: ABNORMAL
ANA TITR SER IF: ABNORMAL {TITER}
RHEUMATOID FACT SER NEPH-ACNC: 10 IU/ML (ref 0–20)

## 2020-12-31 NOTE — RESULT ENCOUNTER NOTE
Dear Chivo,    Here is a summary of your recent test results:  -All of your labs are normal -except your antinuclear antibody test was borderline positive and of unclear significance.  If you continue to have significant joint pains you could meet with a rheumatologist -please let me know if you would like a referral.  Also you're due for a wellness appointment in January 2021 and this could be reviewed at this point.    For additional lab test information, [email or web address] is an excellent reference.    In addition, here is a list of due or overdue Health Maintenance reminders:  Annual Wellness Visit due on 01/28/2021    Please call us at [phone] (or use [employer]) to address the above recommendations if needed.           Thank you very much for trusting me and [facility].     Have a peaceful day.    Healthy regards,  Joaquin Walker MD

## 2021-01-26 DIAGNOSIS — R73.09 HIGH GLUCOSE: ICD-10-CM

## 2021-01-26 DIAGNOSIS — E87.1 LOW SODIUM LEVELS: ICD-10-CM

## 2021-01-26 DIAGNOSIS — E78.5 HYPERLIPIDEMIA LDL GOAL <130: ICD-10-CM

## 2021-01-26 PROCEDURE — 80048 BASIC METABOLIC PNL TOTAL CA: CPT | Performed by: NURSE PRACTITIONER

## 2021-01-26 PROCEDURE — 80061 LIPID PANEL: CPT | Performed by: NURSE PRACTITIONER

## 2021-01-26 PROCEDURE — 36415 COLL VENOUS BLD VENIPUNCTURE: CPT | Performed by: NURSE PRACTITIONER

## 2021-01-27 LAB
ANION GAP SERPL CALCULATED.3IONS-SCNC: 5 MMOL/L (ref 3–14)
BUN SERPL-MCNC: 21 MG/DL (ref 7–30)
CALCIUM SERPL-MCNC: 9.3 MG/DL (ref 8.5–10.1)
CHLORIDE SERPL-SCNC: 101 MMOL/L (ref 94–109)
CHOLEST SERPL-MCNC: 155 MG/DL
CO2 SERPL-SCNC: 28 MMOL/L (ref 20–32)
CREAT SERPL-MCNC: 1.18 MG/DL (ref 0.66–1.25)
GFR SERPL CREATININE-BSD FRML MDRD: 60 ML/MIN/{1.73_M2}
GLUCOSE SERPL-MCNC: 115 MG/DL (ref 70–99)
HDLC SERPL-MCNC: 56 MG/DL
LDLC SERPL CALC-MCNC: 89 MG/DL
NONHDLC SERPL-MCNC: 99 MG/DL
POTASSIUM SERPL-SCNC: 4.4 MMOL/L (ref 3.4–5.3)
SODIUM SERPL-SCNC: 134 MMOL/L (ref 133–144)
TRIGL SERPL-MCNC: 52 MG/DL

## 2021-01-27 NOTE — RESULT ENCOUNTER NOTE
Dear Chivo,    Here is a summary of your recent test results:    Your sodium is now normal. Your cholesterol looks good. You have a slight elevation in blood sugar. I would encourage healthy eating including decreased in carbohydrates and activity to help this.     For additional lab test information, [email or web address] is an excellent reference.    In addition, here is a list of due or overdue Health Maintenance reminders:    Annual Wellness Visit due on 01/28/2021  Kidney Microalbumin Urine Test due on 01/28/2021  FALL RISK ASSESSMENT due on 01/28/2021    Please call us at [phone] (or use [employer]) to address the above recommendations if needed.    Thank you for choosing [facility].  It was an honor and a privilege to participate in your care.       Healthy regards,    Tania Gonzalez, RAZIA  [facility]

## 2021-02-12 NOTE — PATIENT INSTRUCTIONS
Patient Education   Personalized Prevention Plan  You are due for the preventive services outlined below.  Your care team is available to assist you in scheduling these services.  If you have already completed any of these items, please share that information with your care team to update in your medical record.  Health Maintenance Due   Topic Date Due     Kidney Microalbumin Urine Test  01/28/2021     FALL RISK ASSESSMENT  01/28/2021       We are working hard to begin vaccinating more people against COVID-19. Currently, we are only vaccinating individuals age 75 and older and Phase 1a workers - healthcare workers who are unable to do their job remotely. Vaccine availability is very limited.    If you are 75 or older, or a healthcare worker who is unable to do your job remotely, please log in to [employer] using this link to see if we have an open appointment and schedule an appointment.  If there are no appointments left, you will be unable to schedule and need to check back later.  If you are a healthcare worker, you will be asked to provide proof of employment at your appointment. If you cannot, you will be turned away.    Vaccine appointments are being added as they become available. Please check your [employer] account frequently for availability. If you have technical difficulty using [employer], call [phone] for assistance.    You can learn more about the [facility]'s phased approach to administering the vaccine, with details on each phase, [email or web address].      Aa vaccine supply increases and we are able to open appointments to more groups, we will share that information on our website [email or web address]. Check this website to stay up to date on COVID-19 vaccination information.

## 2021-02-12 NOTE — PROGRESS NOTES
"SUBJECTIVE:   Steven Duong is a 75 year old male who presents for Preventive Visit.    Are you in the first 12 months of your Medicare Part B coverage?  No    Healthy Habits:    In general, how would you rate your overall health?  Excellent    Duration of exercise:  15-30 minutes    Do you usually eat at least 4 servings of fruit and vegetables a day, include whole grains    & fiber and avoid regularly eating high fat or \"junk\" foods?  No    Taking medications regularly:  Yes (Incomplete)    Medication side effects:  Not applicable (Incomplete)    PHQ-2 Total Score:PHQ2 Score: Incomplete.    Mental Health:    In general, how would you rate your overall mental or emotional health? good    PHQ-2 Score:      Do you feel safe in your environment? Yes    Have you ever done Advance Care Planning? (For example, a Health Directive, POLST, or a discussion with a medical provider or your loved ones about your wishes): Yes, patient states has an Advance Care Planning document and will bring a copy to the clinic.    Additional concerns to address?      Fall risk:       Cognitive Screenin) Repeat 3 items (Leader, Season, Table)    2) Clock draw: NORMAL  3) 3 item recall: Recalls 3 objects  Results: 3 items recalled: COGNITIVE IMPAIRMENT LESS LIKELY    Mini-CogTM Copyright S Guadalupe. Licensed by the author for use in [facility]; reprinted with permission (mikie@.[facility]). All rights reserved.      Do you have sleep apnea, excessive snoring or daytime drowsiness?: no        Hyperlipidemia Follow-Up      Are you regularly taking any medication or supplement to lower your cholesterol?   Yes- Lipitor    Are you having muscle aches or other side effects that you think could be caused by your cholesterol lowering medication?  No    Hypertension Follow-up      Do you check your blood pressure regularly outside of the clinic? Once in a while     Are you following a low salt diet? Yes    Are your blood pressures ever " "more than 140 on the top number (systolic) OR more   than 90 on the bottom number (diastolic), for example 140/90? No    Toes with \"bruising\" on the 1st and 2nd toes. He has distal numbness. Has had previous frost bite. No claudication.  Some       Reviewed and updated as needed this visit by clinical staff  Tobacco  Allergies  Meds  Problems  Med Hx  Surg Hx  Fam Hx          Reviewed and updated as needed this visit by Provider  Tobacco  Allergies  Meds  Problems  Med Hx  Surg Hx  Fam Hx         Social History     Tobacco Use     Smoking status: Never Smoker     Smokeless tobacco: Never Used   Substance Use Topics     Alcohol use: Yes     Alcohol/week: 11.7 standard drinks     Comment: 1-2 beers nightly                           Current providers sharing in care for this patient include:   Patient Care Team:  Nash Walker MD as PCP - General  Garrett Gonzalez MD as MD (Urology)  Aurora Mckeon, RN as Specialty Care Coordinator (Urology)  Josué Gutiérrez DO as MD (Family Practice)  Tania Gonzalez, IGNACIO CNP as Assigned PCP  Garrett Gonzalez MD as Assigned Surgical Provider      The following health maintenance items are reviewed in Epic and correct as of today:  Health Maintenance   Topic Date Due     MICROALBUMIN  01/28/2021     FALL RISK ASSESSMENT  01/28/2021     COLORECTAL CANCER SCREENING  09/23/2021     ANNUAL REVIEW OF HM ORDERS  12/22/2021     PSA  12/23/2021     DTAP/TDAP/TD IMMUNIZATION (3 - Td) 01/12/2022     BMP  01/26/2022     LIPID  01/26/2022     MEDICARE ANNUAL WELLNESS VISIT  02/15/2022     ADVANCE CARE PLANNING  02/15/2026     HEPATITIS C SCREENING  Completed     PHQ-2  Completed     Pneumococcal Vaccine: 65+ Years  Completed     AORTIC ANEURYSM SCREENING (SYSTEM ASSIGNED)  Completed     INFLUENZA VACCINE  Addressed     ZOSTER IMMUNIZATION  Addressed     Pneumococcal Vaccine: Pediatrics (0 to 5 Years) and At-Risk Patients (6 to 64 Years)  Aged Out     IPV " "IMMUNIZATION  Aged Out     MENINGITIS IMMUNIZATION  Aged Out     HEPATITIS B IMMUNIZATION  Aged Out         ROS:  Constitutional, HEENT, cardiovascular, pulmonary, GI, , musculoskeletal, neuro, skin, endocrine and psych systems are negative, except as otherwise noted.    OBJECTIVE:   /70   Pulse 85   Temp 95.6  F (35.3  C) (Tympanic)   Ht 1.727 m (5' 8\")   Wt 82.6 kg (182 lb)   SpO2 97%   BMI 27.67 kg/m   Estimated body mass index is 27.67 kg/m  as calculated from the following:    Height as of this encounter: 1.727 m (5' 8\").    Weight as of this encounter: 82.6 kg (182 lb).  EXAM:   GENERAL: healthy, alert and no distress  EYES: Eyes grossly normal to inspection, PERRL and conjunctivae and sclerae normal  HENT: ear canals and TM's normal, nose and mouth without ulcers or lesions  NECK: no adenopathy, no asymmetry, masses, or scars and thyroid normal to palpation  RESP: lungs clear to auscultation - no rales, rhonchi or wheezes  BREAST: normal without masses, tenderness or nipple discharge and no palpable axillary masses or adenopathy  CV: regular rate and rhythm, normal S1 S2, no S3 or S4, no murmur, click or rub, no peripheral edema and peripheral pulses strong  ABDOMEN: soft, nontender, no hepatosplenomegaly, no masses and bowel sounds normal   (male): normal male genitalia without lesions or urethral discharge, no hernia  MS: no gross musculoskeletal defects noted, no edema  SKIN: no suspicious lesions or rashes  NEURO: Normal strength and tone, mentation intact and speech normal  PSYCH: mentation appears normal, affect normal/bright  LYMPH: no cervical, supraclavicular, axillary, or inguinal adenopathy  RECTAL: declined exam      ASSESSMENT / PLAN:   Encounter for Medicare annual wellness exam      Hypertension goal BP (blood pressure) < 130/80  Controlled - continue medication.  - hydrochlorothiazide (HYDRODIURIL) 25 MG tablet  Dispense: 90 tablet; Refill: 3  - potassium chloride ER (KLOR-CON) " "20 MEQ CR tablet  Dispense: 90 tablet; Refill: 3  - losartan (COZAAR) 100 MG tablet  Dispense: 90 tablet; Refill: 3    Hyperlipidemia LDL goal <100  Controlled - continue medication.  - atorvastatin (LIPITOR) 10 MG tablet  Dispense: 90 tablet; Refill: 3    h/o prostate cancer (H) - S/p prostatectomy at 56 yo (2001)  Stable and no symptoms    Prediabetes  We will check labs      End of Life Planning:  Patient currently has an advanced directive: Yes.  Practitioner is supportive of decision.    COUNSELING:  Reviewed preventive health counseling, as reflected in patient instructions    Estimated body mass index is 27.67 kg/m  as calculated from the following:    Height as of this encounter: 1.727 m (5' 8\").    Weight as of this encounter: 82.6 kg (182 lb).         reports that he has never smoked. He has never used smokeless tobacco.      Appropriate preventive services were discussed with this patient, including applicable screening as appropriate for cardiovascular disease, diabetes, osteopenia/osteoporosis, and glaucoma.  As appropriate for age/gender, discussed screening for colorectal cancer, prostate cancer, breast cancer, and cervical cancer. Checklist reviewing preventive services available has been given to the patient.    Reviewed patients plan of care and provided an AVS. The Basic Care Plan (routine screening as documented in Health Maintenance) for Steven meets the Care Plan requirement. This Care Plan has been established and reviewed with the Patient.    Return in about 53 weeks (around 2/21/2022) for Annual Wellness Visit.           Joaquin Walker MD     [address] [address]  [email or web address]     Office: [phone]     "

## 2021-02-15 ENCOUNTER — OFFICE VISIT (OUTPATIENT)
Dept: FAMILY MEDICINE | Facility: CLINIC | Age: 76
End: 2021-02-15
Payer: MEDICARE

## 2021-02-15 VITALS
OXYGEN SATURATION: 97 % | HEIGHT: 68 IN | BODY MASS INDEX: 27.58 KG/M2 | SYSTOLIC BLOOD PRESSURE: 120 MMHG | WEIGHT: 182 LBS | DIASTOLIC BLOOD PRESSURE: 70 MMHG | TEMPERATURE: 95.6 F | HEART RATE: 85 BPM

## 2021-02-15 DIAGNOSIS — C61 PROSTATE CANCER (H): ICD-10-CM

## 2021-02-15 DIAGNOSIS — R73.03 PREDIABETES: ICD-10-CM

## 2021-02-15 DIAGNOSIS — I73.00 RAYNAUD'S PHENOMENON WITHOUT GANGRENE: ICD-10-CM

## 2021-02-15 DIAGNOSIS — Z00.00 ENCOUNTER FOR MEDICARE ANNUAL WELLNESS EXAM: Primary | ICD-10-CM

## 2021-02-15 DIAGNOSIS — M79.674 TOE PAIN, BILATERAL: ICD-10-CM

## 2021-02-15 DIAGNOSIS — M79.675 TOE PAIN, BILATERAL: ICD-10-CM

## 2021-02-15 DIAGNOSIS — I10 HYPERTENSION GOAL BP (BLOOD PRESSURE) < 130/80: ICD-10-CM

## 2021-02-15 DIAGNOSIS — E78.5 HYPERLIPIDEMIA LDL GOAL <100: ICD-10-CM

## 2021-02-15 PROBLEM — E87.1 ACUTE HYPONATREMIA: Status: RESOLVED | Noted: 2018-05-17 | Resolved: 2021-02-15

## 2021-02-15 PROBLEM — E87.6 ACUTE HYPOKALEMIA: Status: RESOLVED | Noted: 2018-05-17 | Resolved: 2021-02-15

## 2021-02-15 LAB
CREAT UR-MCNC: 64 MG/DL
MICROALBUMIN UR-MCNC: 12 MG/L
MICROALBUMIN/CREAT UR: 17.91 MG/G CR (ref 0–17)

## 2021-02-15 PROCEDURE — 99214 OFFICE O/P EST MOD 30 MIN: CPT | Mod: 25 | Performed by: FAMILY MEDICINE

## 2021-02-15 PROCEDURE — 82043 UR ALBUMIN QUANTITATIVE: CPT | Performed by: FAMILY MEDICINE

## 2021-02-15 PROCEDURE — G0439 PPPS, SUBSEQ VISIT: HCPCS | Performed by: FAMILY MEDICINE

## 2021-02-15 RX ORDER — POTASSIUM CHLORIDE 1500 MG/1
20 TABLET, EXTENDED RELEASE ORAL DAILY
Qty: 90 TABLET | Refills: 3 | Status: SHIPPED | OUTPATIENT
Start: 2021-02-15 | End: 2022-02-17

## 2021-02-15 RX ORDER — LOSARTAN POTASSIUM 100 MG/1
100 TABLET ORAL DAILY
Qty: 90 TABLET | Refills: 3 | Status: SHIPPED | OUTPATIENT
Start: 2021-02-15 | End: 2021-02-15

## 2021-02-15 RX ORDER — LOSARTAN POTASSIUM 50 MG/1
50 TABLET ORAL DAILY
Qty: 90 TABLET | Refills: 1 | Status: SHIPPED | OUTPATIENT
Start: 2021-02-15 | End: 2021-08-27

## 2021-02-15 RX ORDER — AMLODIPINE BESYLATE 5 MG/1
5 TABLET ORAL DAILY
Qty: 90 TABLET | Refills: 3 | Status: SHIPPED | OUTPATIENT
Start: 2021-02-15 | End: 2021-09-29

## 2021-02-15 RX ORDER — ATORVASTATIN CALCIUM 10 MG/1
10 TABLET, FILM COATED ORAL DAILY
Qty: 90 TABLET | Refills: 3 | Status: SHIPPED | OUTPATIENT
Start: 2021-02-15 | End: 2022-02-17

## 2021-02-15 RX ORDER — HYDROCHLOROTHIAZIDE 25 MG/1
25 TABLET ORAL DAILY
Qty: 90 TABLET | Refills: 3 | Status: SHIPPED | OUTPATIENT
Start: 2021-02-15 | End: 2022-02-17

## 2021-02-15 ASSESSMENT — MIFFLIN-ST. JEOR: SCORE: 1540.05

## 2021-02-15 ASSESSMENT — ACTIVITIES OF DAILY LIVING (ADL): CURRENT_FUNCTION: NO ASSISTANCE NEEDED

## 2021-02-16 NOTE — RESULT ENCOUNTER NOTE
Dear Chivo,    Here is a summary of your recent test results:  -Microalbumin (urine protein) level is elevated. This is suggestive of early damage to your kidneys from high blood pressure.  ADVISE: avoiding anti-inflamatory agents such as ibuprofen (Advil, Motrin) or naproxen (Aleve) as much as possible, keeping your blood pressure in a normal range, and continuing your medication (losartan) that helps protect your kidneys.  Also, this should be rechecked in 1 year.     For additional lab test information, [email or web address] is an excellent reference.           Thank you very much for trusting me and [facility].     Have a peaceful day.    Healthy regards,  Joaquin Walker MD

## 2021-03-16 ENCOUNTER — MYC MEDICAL ADVICE (OUTPATIENT)
Dept: FAMILY MEDICINE | Facility: CLINIC | Age: 76
End: 2021-03-16

## 2021-03-16 NOTE — TELEPHONE ENCOUNTER
My chart message sent     Awaiting reply     Brisa Silveira RN, BSN  [facility]

## 2021-03-19 ENCOUNTER — ALLIED HEALTH/NURSE VISIT (OUTPATIENT)
Dept: NURSING | Facility: CLINIC | Age: 76
End: 2021-03-19
Payer: MEDICARE

## 2021-03-19 VITALS
BODY MASS INDEX: 27.13 KG/M2 | DIASTOLIC BLOOD PRESSURE: 68 MMHG | WEIGHT: 178.4 LBS | SYSTOLIC BLOOD PRESSURE: 118 MMHG | HEART RATE: 82 BPM

## 2021-03-19 DIAGNOSIS — Z01.30 BP CHECK: ICD-10-CM

## 2021-03-19 DIAGNOSIS — I10 HYPERTENSION GOAL BP (BLOOD PRESSURE) < 130/80: Primary | ICD-10-CM

## 2021-03-19 PROCEDURE — 99207 PR NO CHARGE NURSE ONLY: CPT

## 2021-03-19 NOTE — PROGRESS NOTES
Steven Duong is being followed for Blood Pressure management.      BP Readings from Last 3 Encounters:   03/19/21 118/68   02/15/21 120/70   12/11/20 116/72       Wt Readings from Last 2 Encounters:   03/19/21 80.9 kg (178 lb 6.4 oz)   02/15/21 82.6 kg (182 lb)       Is pulse 55 or greater? - Yes  Unable to take O2, patient does not have good circulation in fingers    Pulse Readings from Last 1 Encounters:   03/19/21 82       Current blood pressure medication(s):  Current Outpatient Medications   Medication Sig Dispense Refill     amLODIPine (NORVASC) 5 MG tablet Take 1 tablet (5 mg) by mouth daily 90 tablet 3     atorvastatin (LIPITOR) 10 MG tablet Take 1 tablet (10 mg) by mouth daily 90 tablet 3     cholecalciferol (VITAMIN D3) 1000 UNIT tablet Take 1 tablet (1,000 Units) by mouth daily 30 tablet      co-enzyme Q-10 100 MG CAPS capsule Take by mouth daily       fluticasone (FLONASE) 50 MCG/ACT nasal spray Spray 1-2 sprays into both nostrils daily 48 g 3     hydrochlorothiazide (HYDRODIURIL) 25 MG tablet Take 1 tablet (25 mg) by mouth daily 90 tablet 3     losartan (COZAAR) 50 MG tablet Take 1 tablet (50 mg) by mouth daily 90 tablet 1     LUTEIN PO        Multiple Vitamin (MULTI-VITAMINS) TABS        potassium chloride ER (KLOR-CON) 20 MEQ CR tablet Take 1 tablet (20 mEq) by mouth daily 90 tablet 3          1. Follow up instructions include:     Per PCP.      SUBJECTIVE:                                                    The patient is taking medication as prescribed and is tolerating well.   Patient is not monitoring Blood Pressure at home.       Out of the following complicating factors: Cough, Headache, Lightheadedness, Shortness of breath, Fatigue, Nausea, Sexual Dysfunction, New onset of swelling or edema, Weakness and New onset of Chest Pain, the patient reports:  None    OBJECTIVE:                                                      Today's BP completed using cuff size: regular on left side arm.       Potassium   Date Value Ref Range Status   01/26/2021 4.4 3.4 - 5.3 mmol/L Final     Creatinine   Date Value Ref Range Status   01/26/2021 1.18 0.66 - 1.25 mg/dL Final     Urea Nitrogen   Date Value Ref Range Status   01/26/2021 21 7 - 30 mg/dL Final     GFR Estimate   Date Value Ref Range Status   01/26/2021 60 (L) >60 mL/min/[1.73_m2] Final     Comment:     Non  GFR Calc  Starting 12/18/2018, serum creatinine based estimated GFR (eGFR) will be   calculated using the Chronic Kidney Disease Epidemiology Collaboration   (CKD-EPI) equation.           Education:  general discussion/verbal explanation  Ways to help improve BP/HTN:   Medications  Lose weight  Diet low in fat and rich in fruits, vegetables and low fat dairy products  Reduce salt in diet  Do something active for at least 30 minutes a day on most days of the week  Cut down on alcohol (if you drink more than 2 drinks per day)  Decrease stress (exercise, read, yoga, meditation, time for self, etc.)   Patient was given an opportunity to ask questions.    Patient verbalized understanding of this plan and is agreeable.    Desiree Perales RN  [facility]

## 2021-05-03 DIAGNOSIS — J30.2 SEASONAL ALLERGIC RHINITIS, UNSPECIFIED TRIGGER: ICD-10-CM

## 2021-05-03 RX ORDER — FLUTICASONE PROPIONATE 50 MCG
1-2 SPRAY, SUSPENSION (ML) NASAL DAILY
Qty: 48 G | Refills: 2 | Status: SHIPPED | OUTPATIENT
Start: 2021-05-03 | End: 2022-02-17

## 2021-05-03 NOTE — TELEPHONE ENCOUNTER
Medication Question or Refill    Who is calling:  patient    What medication are you calling about (include dose and sig)?: fluticasone (FLONASE) 50 MCG/ACT nasal spray    Who prescribed the medication?: fluticasone (FLONASE) 50 MCG/ACT nasal spray    Do you need a refill? Yes:   When did you use the medication last?  Recently        Do you have any questions or concerns?  NO       Requested Pharmacy:    [id number] - [place] [id number] AT [place] [address] & [facility] to leave a detailed message?: Yes at Cell number on file:    Telephone Information:   Mobile [phone]

## 2021-06-07 ENCOUNTER — NURSE TRIAGE (OUTPATIENT)
Dept: NURSING | Facility: CLINIC | Age: 76
End: 2021-06-07

## 2021-06-07 NOTE — TELEPHONE ENCOUNTER
Triage call:   Was trying to schedule a COVID shot online  Tested positive March 3rd for COVID and [facility] would not let him schedule his first shot.   Advised that he should be able to schedule his shot. Assisted in transferring to COVID scheduling line.     Toshia Solis RN BSN 6/7/2021 9:30 AM        Reason for Disposition    [1] Requesting COVID-19 vaccine AND [2] vaccine available in the community for this patient group    Additional Information    Negative: [1] Difficulty breathing or swallowing AND [2] starts within 2 hours after injection    Negative: Sounds like a life-threatening emergency to the triager    Negative: [1] Has NOT completed COVID-19 vaccine series AND [2] COVID-19 exposure AND [2] AND [3] typical COVID-19 symptoms    Negative: [1] Has NOT completed COVID-19 vaccine series AND [2]  COVID-19 exposure AND [3] no symptoms    Negative: [1] COVID-19 vaccine series completed (fully vaccinated) in past 3 months AND [2] new-onset of COVID-19 symptoms BUT [3] no known exposure    Negative: [1] Typical COVID-19 symptoms AND [2] symptoms that are NOT expected from vaccine (e.g., cough, difficulty breathing, loss of taste or smell, runny nose, sore throat)    Negative: [1] Typical COVID-19 symptoms AND [2] started > 3 days after getting vaccine    Negative: Fever > 104 F (40 C)    Negative: Sounds like a severe, unusual reaction to the triager    Negative: [1] Redness or red streak around the injection site AND [2] started > 48 hours after getting vaccine AND [3] fever    Negative: [1] Fever > 101 F (38.3 C) AND [2] age > 60 years AND [3] started > 48 hours after getting vaccine    Negative: [1] Fever > 100.0 F (37.8 C) AND [2] bedridden (e.g., nursing home patient, CVA, chronic illness, recovering from surgery) AND [3] started > 48 hours after getting vaccine    Negative: [1] Fever > 100.0 F (37.8 C) AND [2] diabetes mellitus or weak immune system (e.g., HIV positive, cancer chemo, splenectomy, organ  transplant, chronic steroids) AND [3] started > 48 hours after getting vaccine    Negative: [1] Redness or red streak around the injection site AND [2] started > 48 hours after getting vaccine AND [3] no fever  (Exception: red area < 1 inch or 2.5 cm wide)    Negative: [1] Pain, tenderness, or swelling at the injection site AND [2] over 3 days (72 hours) since vaccine AND [3] getting worse    Negative: Fever > 100.0 F (37.8 C) present > 3 days (72 hours)    Negative: [1] Fever > 100.0 F (37.8 C) AND [2] healthcare worker    Negative: [1] Pain, tenderness, or swelling at the injection site AND [2] lasts > 7 days    Negative: [1] Lymph node swelling (i.e., armpit or neck on side of vaccine) AND [2] lasts > 3 weeks    Negative: [1] Requesting COVID-19 vaccine AND [2] healthcare worker (e.g., EMS first responders, doctors, nurses)    Negative: [1] Requesting COVID-19 vaccine AND [2] resident of a long-term care facility (e.g., nursing home)    Protocols used: CORONAVIRUS (COVID-19) VACCINE QUESTIONS AND NBEPAORJC-Y-SQ 3.25

## 2021-06-09 ENCOUNTER — IMMUNIZATION (OUTPATIENT)
Dept: NURSING | Facility: CLINIC | Age: 76
End: 2021-06-09
Payer: MEDICARE

## 2021-06-09 PROCEDURE — 91300 PR COVID VAC PFIZER DIL RECON 30 MCG/0.3 ML IM: CPT

## 2021-06-09 PROCEDURE — 0001A PR COVID VAC PFIZER DIL RECON 30 MCG/0.3 ML IM: CPT

## 2021-06-30 ENCOUNTER — IMMUNIZATION (OUTPATIENT)
Dept: NURSING | Facility: CLINIC | Age: 76
End: 2021-06-30
Attending: INTERNAL MEDICINE
Payer: MEDICARE

## 2021-06-30 PROCEDURE — 0002A PR COVID VAC PFIZER DIL RECON 30 MCG/0.3 ML IM: CPT

## 2021-06-30 PROCEDURE — 91300 PR COVID VAC PFIZER DIL RECON 30 MCG/0.3 ML IM: CPT

## 2021-08-06 ENCOUNTER — NURSE TRIAGE (OUTPATIENT)
Dept: FAMILY MEDICINE | Facility: CLINIC | Age: 76
End: 2021-08-06

## 2021-08-06 NOTE — TELEPHONE ENCOUNTER
"Called # [phone]     S-(situation): Pt with scrotal pain    B-(background): Pt reports hx of Epididymitis. Pt stating he has pain in the \"cord going to the testicle\".      A-(assessment): Pt denies hernia symptoms, unable to pass urine, hematuria, other abd pains, fevers, swelling or discoloration of testicle/scrotum    R-(recommendations): Pt advised to be seen at next available appt. Patient stated an understanding and agreed with plan.  Next 5 appointments (look out 90 days)    Aug 09, 2021 12:00 PM  SHORT with IGNACIO Lyn CNP  [facility] ([facility] ) [address] [place] [id number] [phone]        Advised patient that if new or worsening symptoms appear (reviewed new & worsening symptoms) to call the clinic or be seen in the the ER      Garrett Garcia RN   [facility]    Reason for Disposition    Pain comes and goes (intermittent) and present > 24 hours    Answer Assessment - Initial Assessment Questions  1. LOCATION and RADIATION: \"Where is the pain located?\"       Scrotum both sides  2. QUALITY: \"What does the pain feel like?\"  (e.g., sharp, dull, aching, burning)      stabbing  3. SEVERITY: \"How bad is the pain?\"  (Scale 1-10; or mild, moderate, severe)    - MILD (1-3): doesn't interfere with normal activities     - MODERATE (4-7): interferes with normal activities (e.g., work or school) or awakens from sleep    - SEVERE (8-10): excruciating pain, unable to do any normal activities, difficulty walking      3-4/10  4. ONSET: \"When did the pain start?\"      2-3 weeks ago  5. PATTERN: \"Does it come and go, or has it been constant since it started?\"      Comes and goes  6. SCROTAL APPEARANCE: \"What does the scrotum look like?\" \"Is there any swelling or redness?\"       normal  7. HERNIA: \"Has a doctor ever told you that you have a hernia?\"      no  8. OTHER SYMPTOMS: \"Do you have any " "other symptoms?\" (e.g., fever, abdominal pain, vomiting, difficulty passing urine)      Did have some stinging the other day after urination.    Protocols used: SCROTAL PAIN-A-OH      "

## 2021-08-09 ENCOUNTER — OFFICE VISIT (OUTPATIENT)
Dept: FAMILY MEDICINE | Facility: CLINIC | Age: 76
End: 2021-08-09
Payer: MEDICARE

## 2021-08-09 VITALS
SYSTOLIC BLOOD PRESSURE: 126 MMHG | HEART RATE: 85 BPM | BODY MASS INDEX: 26.37 KG/M2 | HEIGHT: 68 IN | TEMPERATURE: 97.4 F | DIASTOLIC BLOOD PRESSURE: 77 MMHG | OXYGEN SATURATION: 97 % | WEIGHT: 174 LBS

## 2021-08-09 DIAGNOSIS — Z87.438 HISTORY OF EPIDIDYMITIS: ICD-10-CM

## 2021-08-09 DIAGNOSIS — N50.812 PAIN IN BOTH TESTICLES: ICD-10-CM

## 2021-08-09 DIAGNOSIS — Z12.5 ENCOUNTER FOR SCREENING FOR MALIGNANT NEOPLASM OF PROSTATE: ICD-10-CM

## 2021-08-09 DIAGNOSIS — R30.0 DYSURIA: Primary | ICD-10-CM

## 2021-08-09 DIAGNOSIS — N50.811 PAIN IN BOTH TESTICLES: ICD-10-CM

## 2021-08-09 DIAGNOSIS — C61 PROSTATE CANCER (H): ICD-10-CM

## 2021-08-09 DIAGNOSIS — N45.1 EPIDIDYMITIS: ICD-10-CM

## 2021-08-09 LAB
ALBUMIN UR-MCNC: NEGATIVE MG/DL
ANION GAP SERPL CALCULATED.3IONS-SCNC: 5 MMOL/L (ref 3–14)
APPEARANCE UR: CLEAR
BILIRUB UR QL STRIP: NEGATIVE
BUN SERPL-MCNC: 22 MG/DL (ref 7–30)
CALCIUM SERPL-MCNC: 9.4 MG/DL (ref 8.5–10.1)
CHLORIDE BLD-SCNC: 101 MMOL/L (ref 94–109)
CO2 SERPL-SCNC: 28 MMOL/L (ref 20–32)
COLOR UR AUTO: YELLOW
CREAT SERPL-MCNC: 1.01 MG/DL (ref 0.66–1.25)
CRP SERPL-MCNC: <2.9 MG/L (ref 0–8)
ERYTHROCYTE [DISTWIDTH] IN BLOOD BY AUTOMATED COUNT: 12.6 % (ref 10–15)
ERYTHROCYTE [SEDIMENTATION RATE] IN BLOOD BY WESTERGREN METHOD: 5 MM/HR (ref 0–20)
GFR SERPL CREATININE-BSD FRML MDRD: 72 ML/MIN/1.73M2
GLUCOSE BLD-MCNC: 89 MG/DL (ref 70–99)
GLUCOSE UR STRIP-MCNC: NEGATIVE MG/DL
HCT VFR BLD AUTO: 47.6 % (ref 40–53)
HGB BLD-MCNC: 16.4 G/DL (ref 13.3–17.7)
HGB UR QL STRIP: NEGATIVE
KETONES UR STRIP-MCNC: NEGATIVE MG/DL
LEUKOCYTE ESTERASE UR QL STRIP: NEGATIVE
MCH RBC QN AUTO: 31.4 PG (ref 26.5–33)
MCHC RBC AUTO-ENTMCNC: 34.5 G/DL (ref 31.5–36.5)
MCV RBC AUTO: 91 FL (ref 78–100)
NITRATE UR QL: NEGATIVE
PH UR STRIP: 6.5 [PH] (ref 5–7)
PLATELET # BLD AUTO: 198 10E3/UL (ref 150–450)
POTASSIUM BLD-SCNC: 4.2 MMOL/L (ref 3.4–5.3)
PSA SERPL-MCNC: 0.02 UG/L (ref 0–4)
RBC # BLD AUTO: 5.23 10E6/UL (ref 4.4–5.9)
SODIUM SERPL-SCNC: 134 MMOL/L (ref 133–144)
SP GR UR STRIP: 1.02 (ref 1–1.03)
UROBILINOGEN UR STRIP-ACNC: 0.2 E.U./DL
WBC # BLD AUTO: 8 10E3/UL (ref 4–11)

## 2021-08-09 PROCEDURE — 85652 RBC SED RATE AUTOMATED: CPT | Performed by: NURSE PRACTITIONER

## 2021-08-09 PROCEDURE — 99214 OFFICE O/P EST MOD 30 MIN: CPT | Performed by: NURSE PRACTITIONER

## 2021-08-09 PROCEDURE — 36415 COLL VENOUS BLD VENIPUNCTURE: CPT | Performed by: NURSE PRACTITIONER

## 2021-08-09 PROCEDURE — 85027 COMPLETE CBC AUTOMATED: CPT | Performed by: NURSE PRACTITIONER

## 2021-08-09 PROCEDURE — 87086 URINE CULTURE/COLONY COUNT: CPT | Performed by: NURSE PRACTITIONER

## 2021-08-09 PROCEDURE — 80048 BASIC METABOLIC PNL TOTAL CA: CPT | Performed by: NURSE PRACTITIONER

## 2021-08-09 PROCEDURE — 81003 URINALYSIS AUTO W/O SCOPE: CPT | Performed by: NURSE PRACTITIONER

## 2021-08-09 PROCEDURE — 86140 C-REACTIVE PROTEIN: CPT | Performed by: NURSE PRACTITIONER

## 2021-08-09 PROCEDURE — G0103 PSA SCREENING: HCPCS | Performed by: NURSE PRACTITIONER

## 2021-08-09 RX ORDER — SULFAMETHOXAZOLE/TRIMETHOPRIM 800-160 MG
1 TABLET ORAL 2 TIMES DAILY
Qty: 42 TABLET | Refills: 1 | Status: SHIPPED | OUTPATIENT
Start: 2021-08-09 | End: 2022-02-17

## 2021-08-09 ASSESSMENT — MIFFLIN-ST. JEOR: SCORE: 1498.76

## 2021-08-09 NOTE — PATIENT INSTRUCTIONS
Patient Education     Testicular Pain, Unclear Cause   You have had pain in one or both testicles. Based on your exam today, the exact cause of your pain is not certain. But your condition doesn't appear to be dangerous. Testicles are very sensitive. Even a small injury can cause quite a bit of pain. Other possible causes of testicular pain include kidney stones, cysts, mumps, inflammatory conditions, chronic conditions, hernia, infection, and a twisted testicle.  Certain tests may be done to rule out an underlying problem causing the pain. Nothing conclusive was found today. Most likely, the pain will go away on its own. If it doesn t, you may need more tests.    Home care  Medicine may be prescribed to help relieve pain and swelling. This may be an over-the-counter pain reliever or prescription pain medicine. Take all medicine as directed.  The following are general care guidelines:    To relieve pain and swelling, apply an ice pack wrapped in a thin towel for 10 minutes at a time. Continue this on and off for 1 to 2 days.    When lying down, place a small rolled towel under your scrotum. When moving around, wear a jockstrap (athletic supporter) or supportive underwear. These will help support and protect your testicles.    If it hurts to walk, walk as little as possible until you feel better.    Don't do any strenuous activity until you feel better.    Don't have sex until you feel better.    If you have severe pain in the testicle, seek care right away. Delay may lead to permanent loss of the testicle s function.  Follow-up care  Follow up with your healthcare provider, or as advised.  When to seek medical advice  Call your healthcare provider right away if any of these occur:    Fever of 100.4 F (38 C) or higher, or as directed by your provider    Worsening of the pain or severe pain    Swelling of the testicle or scrotum    A lump in the scrotum    Warm and red scrotum (signs of infection)    Nausea and  vomiting    Pain or swelling in abdomen    Trouble peeing    Numbness or weakness in the leg    Shrinking of the testicle    Blood in your urine  [email or web address] last reviewed this educational content on 9/1/2019 2000-2021 [employer]. All rights reserved. This information is not intended as a substitute for professional medical care. Always follow your healthcare professional's instructions.           Patient Education     Epididymitis   Inflammation of the epididymis can cause pain and swelling in your scrotum. The epididymis is a small tube next to the testicle that stores sperm. Epididymitis is often caused by an infection. In sexually active men, it is often caused by a sexually transmitted infection (STI) such as chlamydia or gonorrhea. In boys and in men over 40, it can be from bacteria from other parts of the urinary tract (not an STI infection).  Symptoms may begin with pain in the lower belly (abdomen) or low back. The pain then spreads down into the scrotum. Often only one side is affected. The testicle and scrotum swell and become very painful and red. You may have fever and a burning when passing urine. Sometimes you may have a discharge from the penis.  Treatment is with antibiotics, and anti-inflammatory and pain medicines. The condition should get better over the first few days of treatment. But it will take several weeks for all the swelling and mild pain to go away. If your healthcare provider thinks that an STI is the cause, your sexual partners may need to be treated.  Home care  Here are some tips to help you care for yourself at home:    Support the scrotum. When lying down, place a rolled towel under the scrotum. When walking, use an athletic supporter or 2 pairs of jockey-style underwear.    To ease pain, put ice packs on the inflamed area. To make an ice pack, put ice cubes in a plastic bag that seals at the top. Wrap the bag in a clean, thin towel. Never put an ice pack directly  on the skin.    Take pain medicine as directed. You may use over-the-counter medicines to control pain, unless another medicine was given. If you have long-term (chronic) liver or kidney disease, talk with your healthcare provider before taking these medicines. Also talk with your provider if you've ever had a stomach ulcer or GI (gastrointestinal) bleeding.    Get some rest.  Rest in bed for the first few days until the fever, pain, and swelling get better. It may take several weeks for all of the swelling to go away.    Prevent constipation. Constipation can make you strain. This makes the pain worse. Prevent constipation by eating natural laxatives. These include prunes, fresh fruits, and whole-grain cereals. If needed, use a mild over-the-counter laxative for constipation. Mineral oil can be used to keep the stools soft.    Wait to have sex. Don't have sex until you have finished all treatment and all symptoms have cleared.    Take all medicine as directed. Don't miss any doses. And don't stop taking your medicine early, even if you feel better.  Follow-up care  Follow up with your healthcare provider, or as advised, to be sure you are responding correctly to treatment. If a culture was taken, you may call for the result as directed. A culture test can ensure that you are on the correct antibiotic.   When to seek medical advice  Call your healthcare provider right away if any of these occur:    Fever of 100.4 F (38 C) or higher, or as directed by your provider    More pain or swelling of the testicle after starting treatment    Pressure or pain in your bladder that gets worse    Unable to pass urine for 8 hours  [employer] last reviewed this educational content on 9/1/2019 2000-2021 [employer]. All rights reserved. This information is not intended as a substitute for professional medical care. Always follow your healthcare professional's instructions.

## 2021-08-09 NOTE — PROGRESS NOTES
"Assessment & Plan     Dysuria  Pain in both testicles   History of epididymitis  Epididymitis   Exam and symptoms most consistent with epididymitis other etiologies exist.  Labs.   Scrotal US ordered.   Written education provided.   Bactrim antx x 3 weeks.   He is fine to go visit his daughter be seen there if worsening symptoms.   Red flag symptoms discussed and if these occur present to the emergency room or call 911.  Steven verbalizes understanding of plan of care and is in agreement.     - UA Macro with Reflex to Micro and Culture - lab collect  - US Testicular & Scrotum w Doppler Ltd  - sulfamethoxazole-trimethoprim (BACTRIM DS) 800-160 MG tablet  Dispense: 42 tablet; Refill: 1  - Adult Urology Referral  - CRP, inflammation  - CBC with platelets  - PSA, screen  - Basic metabolic panel  (Ca, Cl, CO2, Creat, Gluc, K, Na, BUN)  - ESR: Erythrocyte sedimentation rate  - CRP, inflammation  - CBC with platelets  - PSA, screen  - Basic metabolic panel  (Ca, Cl, CO2, Creat, Gluc, K, Na, BUN)  - ESR: Erythrocyte sedimentation rate  - Urine Culture Aerobic Bacterial - lab collect  - Urine Culture Aerobic Bacterial - lab collect    h/o prostate cancer (H) - S/p prostatectomy at 54 yo (2001)    - PSA, screen  - PSA, screen    Encounter for screening for malignant neoplasm of prostate     - PSA, screen  - PSA, screen       BMI:   Estimated body mass index is 26.46 kg/m  as calculated from the following:    Height as of this encounter: 1.727 m (5' 8\").    Weight as of this encounter: 78.9 kg (174 lb).     Return in about 4 weeks (around 9/6/2021) for Recheck.    Tania Gonzalez, RAZIA-[facility] MARK Waldron is a 76 year old who presents for the following health issues     HPI     Genitourinary - Male  Onset/Duration: x6 weeks  Description:   Dysuria (painful urination): YES- stinging}  Hematuria (blood in urine): no  Frequency: no  Waking at night to urinate: no  Hesitancy (delay in urine): " "no  Retention (unable to empty): no  Decrease in urinary flow: no  Incontinence: no  Progression of Symptoms:  Intermittent, was getting better - none last 2-3 days then again this morning. Infection 1 year ago  Accompanying Signs & Symptoms:  Fever: no  Back/Flank pain: no  Urethral discharge: no  Testicle lumps/masses/pain: no  Nausea and/or vomiting: no  Abdominal pain: no  History:   History of frequent UTI s: no  History of kidney stones: YES- 1x years ago  History of hernias: no  Personal or Family history of Prostate problems: YES- prostate cancer years ago - removed  Sexually active: no  Precipitating or alleviating factors: stress  Therapies tried and outcome: none    Dysuria no concern for STD.   Bilateral testicle pain.   Past history of prostate CA s/p prostatectomy. History of epididymis treated with 3 rounds of Levaquin then needed bactrim to resolve.     Daughter in hospice out of state. Visits her frequently wants to be sure he is able to go.    Review of Systems   Constitutional, HEENT, cardiovascular, pulmonary, GI, , musculoskeletal, neuro, skin, endocrine and psych systems are negative, except as otherwise noted in the HPI.      Objective    /77 (BP Location: Right arm, Patient Position: Sitting, Cuff Size: Adult Large)   Pulse 85   Temp 97.4  F (36.3  C) (Tympanic)   Ht 1.727 m (5' 8\")   Wt 78.9 kg (174 lb)   SpO2 97%   BMI 26.46 kg/m    Body mass index is 26.46 kg/m .  Physical Exam   GENERAL: healthy, alert and no distress  RESP: lungs clear to auscultation - no rales, rhonchi or wheezes  CV: regular rate and rhythm, normal S1 S2, no S3 or S4, no murmur, click or rub, no peripheral edema and peripheral pulses strong   (male): normal male genitalia without lesions or urethral discharge, no hernia; left posterior testicle tenderness with palpation without noted mass or lump  MS: no gross musculoskeletal defects noted, no edema  SKIN: no suspicious lesions or rashes  NEURO: Normal " strength and tone, mentation intact and speech normal  PSYCH: mentation appears normal, affect normal/bright    Results for orders placed or performed in visit on 08/09/21   UA Macro with Reflex to Micro and Culture - lab collect     Status: Normal    Specimen: Urine, Midstream   Result Value Ref Range    Color Urine Yellow Colorless, Straw, Light Yellow, Yellow    Appearance Urine Clear Clear    Glucose Urine Negative Negative mg/dL    Bilirubin Urine Negative Negative    Ketones Urine Negative Negative mg/dL    Specific Gravity Urine 1.020 1.003 - 1.035    Blood Urine Negative Negative    pH Urine 6.5 5.0 - 7.0    Protein Albumin Urine Negative Negative mg/dL    Urobilinogen Urine 0.2 0.2, 1.0 E.U./dL    Nitrite Urine Negative Negative    Leukocyte Esterase Urine Negative Negative    Narrative    Microscopic not indicated   CRP, inflammation     Status: Normal   Result Value Ref Range    CRP Inflammation <2.9 0.0 - 8.0 mg/L   CBC with platelets     Status: Normal   Result Value Ref Range    WBC Count 8.0 4.0 - 11.0 10e3/uL    RBC Count 5.23 4.40 - 5.90 10e6/uL    Hemoglobin 16.4 13.3 - 17.7 g/dL    Hematocrit 47.6 40.0 - 53.0 %    MCV 91 78 - 100 fL    MCH 31.4 26.5 - 33.0 pg    MCHC 34.5 31.5 - 36.5 g/dL    RDW 12.6 10.0 - 15.0 %    Platelet Count 198 150 - 450 10e3/uL   PSA, screen     Status: Normal   Result Value Ref Range    Prostate Specific Antigen Screen 0.02 0.00 - 4.00 ug/L   Basic metabolic panel  (Ca, Cl, CO2, Creat, Gluc, K, Na, BUN)     Status: Normal   Result Value Ref Range    Sodium 134 133 - 144 mmol/L    Potassium 4.2 3.4 - 5.3 mmol/L    Chloride 101 94 - 109 mmol/L    Carbon Dioxide (CO2) 28 20 - 32 mmol/L    Anion Gap 5 3 - 14 mmol/L    Urea Nitrogen 22 7 - 30 mg/dL    Creatinine 1.01 0.66 - 1.25 mg/dL    Calcium 9.4 8.5 - 10.1 mg/dL    Glucose 89 70 - 99 mg/dL    GFR Estimate 72 >60 mL/min/1.73m2   ESR: Erythrocyte sedimentation rate     Status: Normal   Result Value Ref Range    Erythrocyte  Sedimentation Rate 5 0 - 20 mm/hr   Urine Culture Aerobic Bacterial - lab collect     Status: None    Specimen: Urine, Midstream   Result Value Ref Range    Culture No Growth

## 2021-08-11 LAB — BACTERIA UR CULT: NO GROWTH

## 2021-08-11 NOTE — RESULT ENCOUNTER NOTE
Dear Chivo,    Here is a summary of your recent test results:    -All of your labs are normal. Continue with plan as we discussed. I would encourage you to be seen for any worsening and proceed with the ultrasound as ordered.     For additional lab test information, [email or web address] is an excellent reference.    In addition, here is a list of due or overdue Health Maintenance reminders:    FALL RISK ASSESSMENT due on 01/28/2021    Please call us at [phone] (or use [employer]) to address the above recommendations if needed.    Thank you for choosing [facility].  It was an honor and a privilege to participate in your care.       Healthy regards,    Tania Gonzalez, RAZIA  [facility]

## 2021-08-17 ENCOUNTER — HOSPITAL ENCOUNTER (OUTPATIENT)
Dept: ULTRASOUND IMAGING | Facility: CLINIC | Age: 76
Discharge: HOME | End: 2021-08-17
Attending: NURSE PRACTITIONER | Admitting: NURSE PRACTITIONER
Payer: MEDICARE

## 2021-08-17 DIAGNOSIS — R30.0 DYSURIA: ICD-10-CM

## 2021-08-17 DIAGNOSIS — N50.811 PAIN IN BOTH TESTICLES: ICD-10-CM

## 2021-08-17 DIAGNOSIS — Z87.438 HISTORY OF EPIDIDYMITIS: ICD-10-CM

## 2021-08-17 DIAGNOSIS — N50.812 PAIN IN BOTH TESTICLES: ICD-10-CM

## 2021-08-17 PROCEDURE — 76870 US EXAM SCROTUM: CPT

## 2021-08-17 NOTE — RESULT ENCOUNTER NOTE
Note to Staff: please call the patient to explain results and to check on current symptoms.  Symptoms improved on antibiotics?    Today's scrotal/testicular ultrasound shows normal testicles and no epididymitis but bilateral varicoceles.    Previous testicular ultrasound back in 12/2019 had no evidence of varicocele.    Given he has pain symptoms and change in his ultrasound I would recommend he see urology again.  He has already seen them so he should be able to see the same team if he would like a new referral please let me know.  I do not think this is urgent unless symptoms have worsened.    Continue antibiotic course.      Tania Gonzalez, FNP-BC

## 2021-08-18 ENCOUNTER — MYC MEDICAL ADVICE (OUTPATIENT)
Dept: FAMILY MEDICINE | Facility: CLINIC | Age: 76
End: 2021-08-18

## 2021-08-18 NOTE — TELEPHONE ENCOUNTER
Please see my chart message below     Please review and advise     Thank you     Brisa Silveira RN, BSN  [facility]

## 2021-08-18 NOTE — TELEPHONE ENCOUNTER
Continue antibiotics until sees urology please.     He does not have hydroceles which would be fluid on the testicles what he has is varicoceles.    Varicocele -- A varicocele, which is present in 15 to 20 percent of post-pubertal males, is caused by dilatation of the pampiniform plexus of spermatic veins   Varicocele may be asymptomatic or present with any or all of the following:  ?Dull, aching, usually left-sided scrotal pain, typically noticeable when standing and relieved by recumbency  ?Atrophy of the left testicle, believed to be secondary to loss of germ cell mass by induction of apoptosis (programmed cell death) initiated by slightly increased scrotal temperature  ?Decreased fertility    Thanks,       Tania Gonzalez, FNP-BC

## 2021-08-21 ENCOUNTER — MYC MEDICAL ADVICE (OUTPATIENT)
Dept: FAMILY MEDICINE | Facility: CLINIC | Age: 76
End: 2021-08-21

## 2021-08-23 ENCOUNTER — MYC MEDICAL ADVICE (OUTPATIENT)
Dept: FAMILY MEDICINE | Facility: CLINIC | Age: 76
End: 2021-08-23

## 2021-08-23 NOTE — TELEPHONE ENCOUNTER
Routing to Provider to review  need for recheck in office? Need to change Urology referral to Urgent?    Garrett FUENTES RN   [facility]

## 2021-08-24 NOTE — TELEPHONE ENCOUNTER
Can you please call the urology clinic to see if they can get him in sooner and/or get him on a wait list.

## 2021-08-25 NOTE — TELEPHONE ENCOUNTER
Spoke with the urology clinic and not able to get patient in sooner for an appointment. They did suggest that pcp reaches out to provider at the urology clinic if needs to get in sooner.Please advise    Riky Luis

## 2021-08-26 NOTE — TELEPHONE ENCOUNTER
Called urology clinic in [place] at [phone].     Patient added to wait list. Message sent to urology nurses to see if there is a sooner appointment. Urology will contact patient if there is a sooner appointment.     Mychart sent to patient.     Desiree Perales RN  [facility]

## 2021-09-25 ENCOUNTER — HEALTH MAINTENANCE LETTER (OUTPATIENT)
Age: 76
End: 2021-09-25

## 2021-10-18 ENCOUNTER — OFFICE VISIT (OUTPATIENT)
Dept: UROLOGY | Facility: CLINIC | Age: 76
End: 2021-10-18
Attending: NURSE PRACTITIONER
Payer: MEDICARE

## 2021-10-18 VITALS
SYSTOLIC BLOOD PRESSURE: 132 MMHG | DIASTOLIC BLOOD PRESSURE: 74 MMHG | WEIGHT: 171 LBS | HEIGHT: 68 IN | BODY MASS INDEX: 25.91 KG/M2

## 2021-10-18 DIAGNOSIS — Z87.438 HISTORY OF EPIDIDYMITIS: ICD-10-CM

## 2021-10-18 DIAGNOSIS — I86.1 LEFT VARICOCELE: ICD-10-CM

## 2021-10-18 DIAGNOSIS — N50.811 PAIN IN BOTH TESTICLES: ICD-10-CM

## 2021-10-18 DIAGNOSIS — N50.812 PAIN IN BOTH TESTICLES: ICD-10-CM

## 2021-10-18 DIAGNOSIS — Z85.46 PERSONAL HISTORY OF MALIGNANT NEOPLASM OF PROSTATE: Primary | ICD-10-CM

## 2021-10-18 LAB
ALBUMIN UR-MCNC: NEGATIVE MG/DL
APPEARANCE UR: CLEAR
BILIRUB UR QL STRIP: NEGATIVE
COLOR UR AUTO: YELLOW
GLUCOSE UR STRIP-MCNC: NEGATIVE MG/DL
HGB UR QL STRIP: NEGATIVE
KETONES UR STRIP-MCNC: ABNORMAL MG/DL
LEUKOCYTE ESTERASE UR QL STRIP: NEGATIVE
NITRATE UR QL: NEGATIVE
PH UR STRIP: 7 [PH] (ref 5–7)
RESIDUAL VOLUME (RV) (EXTERNAL): 56
SP GR UR STRIP: 1.02 (ref 1–1.03)
UROBILINOGEN UR STRIP-ACNC: 0.2 E.U./DL

## 2021-10-18 PROCEDURE — 51798 US URINE CAPACITY MEASURE: CPT | Performed by: STUDENT IN AN ORGANIZED HEALTH CARE EDUCATION/TRAINING PROGRAM

## 2021-10-18 PROCEDURE — 99213 OFFICE O/P EST LOW 20 MIN: CPT | Mod: 25 | Performed by: STUDENT IN AN ORGANIZED HEALTH CARE EDUCATION/TRAINING PROGRAM

## 2021-10-18 PROCEDURE — 81003 URINALYSIS AUTO W/O SCOPE: CPT | Mod: QW | Performed by: STUDENT IN AN ORGANIZED HEALTH CARE EDUCATION/TRAINING PROGRAM

## 2021-10-18 ASSESSMENT — PAIN SCALES - GENERAL: PAINLEVEL: NO PAIN (0)

## 2021-10-18 ASSESSMENT — MIFFLIN-ST. JEOR: SCORE: 1485.15

## 2021-10-18 NOTE — PROGRESS NOTES
Chief Complaint:   Testicular pain           Consult or Referral:     Mr. Steven Duong is a 76 year old male seen at the request of Dr. Gonzalez.         History of Present Illness:     Steven Duong is a 76 year old male being seen for follow-up of testicular pain..  Duration of problem: Many months  Previous treatments: Past history of prostatectomy in 2001 for prostate cancer      Reviewed previous notes from Dr. Gonzalez    Intake has been seen in the past by Dr. Gonzalez for persistent testicular pain.  He feels that the pain has now much better.  He does not have any significant issues at the moment he did recently have a ultrasound of the scrotum which showed bilateral varicoceles and he is concerned by the findings.  He feels that since he wears supportive undergarments and takes occasional ibuprofen for the testicular pain he is issues with pain has more or less resolved at the moment.             Past Medical History:     Past Medical History:   Diagnosis Date     Cancer (H) Prostrate     Hyperlipidemia LDL goal < 130      Hypertension goal BP (blood pressure) < 140/90      Meniere's disease, unspecified      Pericarditis 2007     Personal history of prostate cancer 2001            Past Surgical History:     Past Surgical History:   Procedure Laterality Date     COLONOSCOPY  2020    no polyps     SUPRAPUBIC PROSTATECTOMY  2001     TONSILLECTOMY  1953            Medications     Current Outpatient Medications   Medication     amLODIPine (NORVASC) 5 MG tablet     atorvastatin (LIPITOR) 10 MG tablet     cholecalciferol (VITAMIN D3) 1000 UNIT tablet     co-enzyme Q-10 100 MG CAPS capsule     fluticasone (FLONASE) 50 MCG/ACT nasal spray     hydrochlorothiazide (HYDRODIURIL) 25 MG tablet     losartan (COZAAR) 50 MG tablet     LUTEIN PO     Multiple Vitamin (MULTI-VITAMINS) TABS     potassium chloride ER (KLOR-CON) 20 MEQ CR tablet     sulfamethoxazole-trimethoprim (BACTRIM DS) 800-160 MG tablet     No  current facility-administered medications for this visit.            Family History:     Family History   Problem Relation Age of Onset     Coronary Artery Disease Paternal Grandfather      Cerebrovascular Disease Mother         with associated parkinsons     Coronary Artery Disease Father 64        bypass x 2 (initial at 65 yo)     Prostate Cancer Father 70     No Known Problems Sister      Prostate Cancer Brother      Brain Cancer Son 13     Breast Cancer Daughter      Liver Cancer Daughter         metastatic to bone     Cancer Daughter         Bone - stage 4     Melanoma Daughter      No Known Problems Sister      No Known Problems Sister      Prostate Cancer Brother      Prostate Cancer Cousin      Colon Cancer No family hx of             Social History:     Social History     Socioeconomic History     Marital status:      Spouse name: Lalitha     Number of children: 2     Years of education: Not on file     Highest education level: Not on file   Occupational History     Occupation: HESIODO     Employer: [employer]   Tobacco Use     Smoking status: Never Smoker     Smokeless tobacco: Never Used   Substance and Sexual Activity     Alcohol use: Yes     Alcohol/week: 11.7 standard drinks     Comment: 1-2 beers nightly     Drug use: No     Sexual activity: Not Currently     Partners: Female     Birth control/protection: Surgical   Other Topics Concern      Service Not Asked     Blood Transfusions Not Asked     Caffeine Concern No     Comment: 1 serv/day     Occupational Exposure Not Asked     Hobby Hazards Not Asked     Sleep Concern Yes     Comment: occ with travel for work     Stress Concern Not Asked     Weight Concern Not Asked     Special Diet Not Asked     Back Care Not Asked     Exercise Yes     Comment: regular     Bike Helmet Not Asked     Seat Belt Yes     Self-Exams Not Asked     Parent/sibling w/ CABG, MI or angioplasty before 65F 55M? No   Social History Narrative     Not on file  "    Social Determinants of Health     Financial Resource Strain:      Difficulty of Paying Living Expenses:    Food Insecurity:      Worried About Running Out of Food in the Last Year:      Ran Out of Food in the Last Year:    Transportation Needs:      Lack of Transportation (Medical):      Lack of Transportation (Non-Medical):    Physical Activity:      Days of Exercise per Week:      Minutes of Exercise per Session:    Stress:      Feeling of Stress :    Social Connections:      Frequency of Communication with Friends and Family:      Frequency of Social Gatherings with Friends and Family:      Attends Temple Services:      Active Member of Clubs or Organizations:      Attends Club or Organization Meetings:      Marital Status:    Intimate Partner Violence:      Fear of Current or Ex-Partner:      Emotionally Abused:      Physically Abused:      Sexually Abused:             Allergies:   Penicillins         Review of Systems:  From intake questionnaire     Skin: negative  Eyes: negative  Ears/Nose/Throat: negative  Respiratory: No shortness of breath, dyspnea on exertion, cough, or hemoptysis  Cardiovascular: No chest pain or palpitations  Gastrointestinal: negative; no nausea/vomiting, constipation or diarrhea  Genitourinary: as per HPI  Musculoskeletal: negative  Neurologic: negative  Psychiatric: negative  Hematologic/Lymphatic/Immunologic: negative  Endocrine: negative         Physical Exam:     Patient is a 76 year old  male   Vitals: Blood pressure 132/74, height 1.727 m (5' 8\"), weight 77.6 kg (171 lb).  Constitutional: Body mass index is 26 kg/m .  Alert, no acute distress, oriented, conversant  Eyes: no scleral icterus; extraocular muscles intact, moist conjunctivae  Neck: trachea midline, no thyromegaly  Ears/nose/mouth: throat/mouth:normal, good dentition  Respiratory: no respiratory distress, or pursed lip breathing  Cardiovascular: pulses strong and intact; no obvious jugular venous distension " present  Gastrointestinal: soft, nontender, no organomegaly or masses,   Lymphatics: No inguinal adenopathy  Musculoskeletal: extremities normal, no peripheral edema  Skin: no suspicious lesions or rashes  Neuro: Alert, oriented, speech and mentation normal  Psych: affect and mood normal, alert and oriented to person, place and time  Gait: Normal  : penis, scrotum, testes normal, palpable varicocele on the left side      Labs and Pathology:    The following labs were reviewed by me and discussed with the patient:    Significant for   Lab Results   Component Value Date    CR 1.01 08/09/2021    CR 1.18 01/26/2021    CR 1.09 12/11/2020    CR 1.04 10/15/2020    CR 1.16 01/28/2020    CR 1.19 06/05/2019    CR 1.01 04/04/2019    CR 1.08 01/17/2019    CR 1.03 12/24/2018    CR 1.00 05/22/2018    CR 1.00 05/17/2018     PSA   Date Value Ref Range Status   12/23/2020 0.02 0 - 4 ug/L Final     Comment:     Assay Method:  Chemiluminescence using Siemens Vista analyzer   01/28/2020 0.03 0 - 4 ug/L Final     Comment:     Assay Method:  Chemiluminescence using Siemens Vista analyzer   01/17/2019 0.02 0 - 4 ug/L Final     Comment:     Assay Method:  Chemiluminescence using Siemens Vista analyzer   05/22/2018 0.02 0 - 4 ug/L Final     Comment:     Assay Method:  Chemiluminescence using Siemens Vista analyzer   01/16/2018 0.02 0 - 4 ug/L Final     Comment:     Assay Method:  Chemiluminescence using Siemens Vista analyzer   11/18/2016 0.02 0 - 4 ug/L Final     Comment:     Assay Method:  Chemiluminescence using Siemens Vista analyzer   11/16/2015 0.02 0 - 4 ug/L Final     Comment:     PSA results are about 7% lower than our prior method due to a methodology   change   on August 30, 2011.     10/31/2014 0.02 0 - 4 ug/L Final     Comment:     PSA results are about 7% lower than our prior method due to a methodology   change   on August 30, 2011.     09/23/2013 <0.07 0 - 4 ug/L Final   09/07/2012 <0.07 0 - 4 ug/L Final     Prostate  Specific Antigen Screen   Date Value Ref Range Status   08/09/2021 0.02 0.00 - 4.00 ug/L Final             Imaging:    The following imaging exams were independently viewed and interpreted by me and discussed with patient:  Scrotal Ultrasound: Abnormal: Bilateral varicocele       Standardized Questionnaire:      AUASS: 2/35 QOL0       Assessment and Plan:   Prostate cancer  PSA remains undetectable  Continue yearly PSA measurements  Pain in both testicles    - Adult Urology Referral    History of epididymitis    - Adult Urology Referral    Left varicocele    - Adult Urology Referral    We discussed in detail about the etiology of testicular pain associated with varicocele and the fact that testicular support does help significantly with the pain associated.  I discussed with him that he may also wear some supportive jockstrap if the pain is persistent even with supportive undergarments.  Out that his flank information  Plan:  Follow-up as needed    Orders  Orders Placed This Encounter   Procedures     MEASURE POST-VOID RESIDUAL URINE/BLADDER CAPACITY, US NON-IMAGING (72464)     UA without Microscopic [[id number]]       Franky Oneal MD  [facility] [place]      ==========================    Additional Billing and Coding Information:  Review of external notes as documented above   Review of the result(s) of each unique test - Ultrasound scrotum    Independent interpretation of a test performed by another physician/other qualified health care professional (not separately reported) -       Discussion of management or test interpretation with external physician/other qualified healthcare professional/appropriate source -       Diagnosis or treatment significantly limited by social determinants of health -       15 minutes spent on the date of the encounter doing chart review, review of test results, interpretation of tests, patient visit and documentation     ==========================

## 2021-10-18 NOTE — NURSING NOTE
Chief Complaint   Patient presents with     Testicular Pain     PVR:  56 mL    Annabelle Cohen, EMT

## 2021-10-18 NOTE — LETTER
10/18/2021       RE: Steven Duong  [address]  [place] [address]     Dear Colleague,    Thank you for referring your patient, Steven Duong, to the [facility] [place] at [facility]. Please see a copy of my visit note below.          Chief Complaint:   Testicular pain           Consult or Referral:     Mr. Steven Duong is a 76 year old male seen at the request of Dr. Gonzalez.         History of Present Illness:     Steven Duong is a 76 year old male being seen for follow-up of testicular pain..  Duration of problem: Many months  Previous treatments: Past history of prostatectomy in 2001 for prostate cancer      Reviewed previous notes from Dr. Gonzalez    Intake has been seen in the past by Dr. Gonzalez for persistent testicular pain.  He feels that the pain has now much better.  He does not have any significant issues at the moment he did recently have a ultrasound of the scrotum which showed bilateral varicoceles and he is concerned by the findings.  He feels that since he wears supportive undergarments and takes occasional ibuprofen for the testicular pain he is issues with pain has more or less resolved at the moment.             Past Medical History:     Past Medical History:   Diagnosis Date     Cancer (H) Prostrate     Hyperlipidemia LDL goal < 130      Hypertension goal BP (blood pressure) < 140/90      Meniere's disease, unspecified      Pericarditis 2007     Personal history of prostate cancer 2001            Past Surgical History:     Past Surgical History:   Procedure Laterality Date     COLONOSCOPY  2020    no polyps     SUPRAPUBIC PROSTATECTOMY  2001     TONSILLECTOMY  1953            Medications     Current Outpatient Medications   Medication     amLODIPine (NORVASC) 5 MG tablet     atorvastatin (LIPITOR) 10 MG tablet     cholecalciferol (VITAMIN D3) 1000 UNIT tablet     co-enzyme Q-10 100 MG CAPS  capsule     fluticasone (FLONASE) 50 MCG/ACT nasal spray     hydrochlorothiazide (HYDRODIURIL) 25 MG tablet     losartan (COZAAR) 50 MG tablet     LUTEIN PO     Multiple Vitamin (MULTI-VITAMINS) TABS     potassium chloride ER (KLOR-CON) 20 MEQ CR tablet     sulfamethoxazole-trimethoprim (BACTRIM DS) 800-160 MG tablet     No current facility-administered medications for this visit.            Family History:     Family History   Problem Relation Age of Onset     Coronary Artery Disease Paternal Grandfather      Cerebrovascular Disease Mother         with associated parkinsons     Coronary Artery Disease Father 64        bypass x 2 (initial at 63 yo)     Prostate Cancer Father 70     No Known Problems Sister      Prostate Cancer Brother      Brain Cancer Son 13     Breast Cancer Daughter      Liver Cancer Daughter         metastatic to bone     Cancer Daughter         Bone - stage 4     Melanoma Daughter      No Known Problems Sister      No Known Problems Sister      Prostate Cancer Brother      Prostate Cancer Cousin      Colon Cancer No family hx of             Social History:     Social History     Socioeconomic History     Marital status:      Spouse name: Lalitha     Number of children: 2     Years of education: Not on file     Highest education level: Not on file   Occupational History     Occupation: sales     Employer: [employer]   Tobacco Use     Smoking status: Never Smoker     Smokeless tobacco: Never Used   Substance and Sexual Activity     Alcohol use: Yes     Alcohol/week: 11.7 standard drinks     Comment: 1-2 beers nightly     Drug use: No     Sexual activity: Not Currently     Partners: Female     Birth control/protection: Surgical   Other Topics Concern      Service Not Asked     Blood Transfusions Not Asked     Caffeine Concern No     Comment: 1 serv/day     Occupational Exposure Not Asked     Hobby Hazards Not Asked     Sleep Concern Yes     Comment: occ with travel for work      "Stress Concern Not Asked     Weight Concern Not Asked     Special Diet Not Asked     Back Care Not Asked     Exercise Yes     Comment: regular     Bike Helmet Not Asked     Seat Belt Yes     Self-Exams Not Asked     Parent/sibling w/ CABG, MI or angioplasty before 65F 55M? No   Social History Narrative     Not on file     Social Determinants of Health     Financial Resource Strain:      Difficulty of Paying Living Expenses:    Food Insecurity:      Worried About Running Out of Food in the Last Year:      Ran Out of Food in the Last Year:    Transportation Needs:      Lack of Transportation (Medical):      Lack of Transportation (Non-Medical):    Physical Activity:      Days of Exercise per Week:      Minutes of Exercise per Session:    Stress:      Feeling of Stress :    Social Connections:      Frequency of Communication with Friends and Family:      Frequency of Social Gatherings with Friends and Family:      Attends Jain Services:      Active Member of Clubs or Organizations:      Attends Club or Organization Meetings:      Marital Status:    Intimate Partner Violence:      Fear of Current or Ex-Partner:      Emotionally Abused:      Physically Abused:      Sexually Abused:             Allergies:   Penicillins         Review of Systems:  From intake questionnaire     Skin: negative  Eyes: negative  Ears/Nose/Throat: negative  Respiratory: No shortness of breath, dyspnea on exertion, cough, or hemoptysis  Cardiovascular: No chest pain or palpitations  Gastrointestinal: negative; no nausea/vomiting, constipation or diarrhea  Genitourinary: as per HPI  Musculoskeletal: negative  Neurologic: negative  Psychiatric: negative  Hematologic/Lymphatic/Immunologic: negative  Endocrine: negative         Physical Exam:     Patient is a 76 year old  male   Vitals: Blood pressure 132/74, height 1.727 m (5' 8\"), weight 77.6 kg (171 lb).  Constitutional: Body mass index is 26 kg/m .  Alert, no acute distress, oriented, " conversant  Eyes: no scleral icterus; extraocular muscles intact, moist conjunctivae  Neck: trachea midline, no thyromegaly  Ears/nose/mouth: throat/mouth:normal, good dentition  Respiratory: no respiratory distress, or pursed lip breathing  Cardiovascular: pulses strong and intact; no obvious jugular venous distension present  Gastrointestinal: soft, nontender, no organomegaly or masses,   Lymphatics: No inguinal adenopathy  Musculoskeletal: extremities normal, no peripheral edema  Skin: no suspicious lesions or rashes  Neuro: Alert, oriented, speech and mentation normal  Psych: affect and mood normal, alert and oriented to person, place and time  Gait: Normal  : penis, scrotum, testes normal, palpable varicocele on the left side      Labs and Pathology:    The following labs were reviewed by me and discussed with the patient:    Significant for   Lab Results   Component Value Date    CR 1.01 08/09/2021    CR 1.18 01/26/2021    CR 1.09 12/11/2020    CR 1.04 10/15/2020    CR 1.16 01/28/2020    CR 1.19 06/05/2019    CR 1.01 04/04/2019    CR 1.08 01/17/2019    CR 1.03 12/24/2018    CR 1.00 05/22/2018    CR 1.00 05/17/2018     PSA   Date Value Ref Range Status   12/23/2020 0.02 0 - 4 ug/L Final     Comment:     Assay Method:  Chemiluminescence using Siemens Vista analyzer   01/28/2020 0.03 0 - 4 ug/L Final     Comment:     Assay Method:  Chemiluminescence using Siemens Vista analyzer   01/17/2019 0.02 0 - 4 ug/L Final     Comment:     Assay Method:  Chemiluminescence using Siemens Vista analyzer   05/22/2018 0.02 0 - 4 ug/L Final     Comment:     Assay Method:  Chemiluminescence using Siemens Vista analyzer   01/16/2018 0.02 0 - 4 ug/L Final     Comment:     Assay Method:  Chemiluminescence using Siemens Vista analyzer   11/18/2016 0.02 0 - 4 ug/L Final     Comment:     Assay Method:  Chemiluminescence using Siemens Vista analyzer   11/16/2015 0.02 0 - 4 ug/L Final     Comment:     PSA results are about 7% lower than  our prior method due to a methodology   change   on August 30, 2011.     10/31/2014 0.02 0 - 4 ug/L Final     Comment:     PSA results are about 7% lower than our prior method due to a methodology   change   on August 30, 2011.     09/23/2013 <0.07 0 - 4 ug/L Final   09/07/2012 <0.07 0 - 4 ug/L Final     Prostate Specific Antigen Screen   Date Value Ref Range Status   08/09/2021 0.02 0.00 - 4.00 ug/L Final             Imaging:    The following imaging exams were independently viewed and interpreted by me and discussed with patient:  Scrotal Ultrasound: Abnormal: Bilateral varicocele       Standardized Questionnaire:      AUASS: 2/35 QOL0       Assessment and Plan:   Prostate cancer  PSA remains undetectable  Continue yearly PSA measurements  Pain in both testicles    - Adult Urology Referral    History of epididymitis    - Adult Urology Referral    Left varicocele    - Adult Urology Referral    We discussed in detail about the etiology of testicular pain associated with varicocele and the fact that testicular support does help significantly with the pain associated.  I discussed with him that he may also wear some supportive jockstrap if the pain is persistent even with supportive undergarments.  Out that his flank information  Plan:  Follow-up as needed    Orders  Orders Placed This Encounter   Procedures     MEASURE POST-VOID RESIDUAL URINE/BLADDER CAPACITY, US NON-IMAGING (93517)     UA without Microscopic [[id number]]       Franky Oneal MD  [facility] [place]      ==========================    Additional Billing and Coding Information:  Review of external notes as documented above   Review of the result(s) of each unique test - Ultrasound scrotum    Independent interpretation of a test performed by another physician/other qualified health care professional (not separately reported) -       Discussion of management or test interpretation with external physician/other qualified healthcare  professional/appropriate source -       Diagnosis or treatment significantly limited by social determinants of health -       15 minutes spent on the date of the encounter doing chart review, review of test results, interpretation of tests, patient visit and documentation     ==========================

## 2022-02-14 ASSESSMENT — ENCOUNTER SYMPTOMS
HEADACHES: 0
ABDOMINAL PAIN: 0
SORE THROAT: 0
DYSURIA: 0
DIARRHEA: 0
COUGH: 0
NERVOUS/ANXIOUS: 0
PALPITATIONS: 0
NAUSEA: 0
PARESTHESIAS: 0
FREQUENCY: 0
HEMATOCHEZIA: 0
DIZZINESS: 0
ARTHRALGIAS: 0
SHORTNESS OF BREATH: 0
CONSTIPATION: 0
CHILLS: 0
HEARTBURN: 0
HEMATURIA: 0
JOINT SWELLING: 0
FEVER: 0
EYE PAIN: 0
WEAKNESS: 0
MYALGIAS: 0

## 2022-02-14 ASSESSMENT — ACTIVITIES OF DAILY LIVING (ADL): CURRENT_FUNCTION: NO ASSISTANCE NEEDED

## 2022-02-17 ENCOUNTER — OFFICE VISIT (OUTPATIENT)
Dept: FAMILY MEDICINE | Facility: CLINIC | Age: 77
End: 2022-02-17
Payer: MEDICARE

## 2022-02-17 VITALS
SYSTOLIC BLOOD PRESSURE: 126 MMHG | HEART RATE: 80 BPM | DIASTOLIC BLOOD PRESSURE: 72 MMHG | TEMPERATURE: 97.4 F | WEIGHT: 173 LBS | OXYGEN SATURATION: 99 % | HEIGHT: 68 IN | RESPIRATION RATE: 14 BRPM | BODY MASS INDEX: 26.22 KG/M2

## 2022-02-17 DIAGNOSIS — Z00.00 ENCOUNTER FOR MEDICARE ANNUAL WELLNESS EXAM: Primary | ICD-10-CM

## 2022-02-17 DIAGNOSIS — J30.2 SEASONAL ALLERGIC RHINITIS, UNSPECIFIED TRIGGER: ICD-10-CM

## 2022-02-17 DIAGNOSIS — I10 HYPERTENSION GOAL BP (BLOOD PRESSURE) < 130/80: ICD-10-CM

## 2022-02-17 DIAGNOSIS — C61 PROSTATE CANCER (H): ICD-10-CM

## 2022-02-17 DIAGNOSIS — Z12.11 SCREEN FOR COLON CANCER: ICD-10-CM

## 2022-02-17 DIAGNOSIS — Z12.5 SCREENING FOR MALIGNANT NEOPLASM OF PROSTATE: ICD-10-CM

## 2022-02-17 DIAGNOSIS — I73.00 RAYNAUD'S PHENOMENON WITHOUT GANGRENE: ICD-10-CM

## 2022-02-17 DIAGNOSIS — E78.5 HYPERLIPIDEMIA LDL GOAL <100: ICD-10-CM

## 2022-02-17 PROCEDURE — 99214 OFFICE O/P EST MOD 30 MIN: CPT | Mod: 25 | Performed by: FAMILY MEDICINE

## 2022-02-17 PROCEDURE — 80053 COMPREHEN METABOLIC PANEL: CPT | Performed by: FAMILY MEDICINE

## 2022-02-17 PROCEDURE — 36415 COLL VENOUS BLD VENIPUNCTURE: CPT | Performed by: FAMILY MEDICINE

## 2022-02-17 PROCEDURE — 82043 UR ALBUMIN QUANTITATIVE: CPT | Performed by: FAMILY MEDICINE

## 2022-02-17 PROCEDURE — 84153 ASSAY OF PSA TOTAL: CPT | Performed by: FAMILY MEDICINE

## 2022-02-17 PROCEDURE — G0439 PPPS, SUBSEQ VISIT: HCPCS | Performed by: FAMILY MEDICINE

## 2022-02-17 PROCEDURE — 80061 LIPID PANEL: CPT | Performed by: FAMILY MEDICINE

## 2022-02-17 RX ORDER — ATORVASTATIN CALCIUM 10 MG/1
10 TABLET, FILM COATED ORAL DAILY
Qty: 90 TABLET | Refills: 3 | Status: SHIPPED | OUTPATIENT
Start: 2022-02-17 | End: 2022-11-28

## 2022-02-17 RX ORDER — LOSARTAN POTASSIUM 50 MG/1
50 TABLET ORAL DAILY
Qty: 90 TABLET | Refills: 3 | Status: SHIPPED | OUTPATIENT
Start: 2022-02-17 | End: 2022-11-28

## 2022-02-17 RX ORDER — AMLODIPINE BESYLATE 5 MG/1
5 TABLET ORAL DAILY
Qty: 90 TABLET | Refills: 3 | Status: SHIPPED | OUTPATIENT
Start: 2022-02-17 | End: 2022-11-28

## 2022-02-17 RX ORDER — FLUTICASONE PROPIONATE 50 MCG
1-2 SPRAY, SUSPENSION (ML) NASAL DAILY
Qty: 48 G | Refills: 2 | Status: SHIPPED | OUTPATIENT
Start: 2022-02-17 | End: 2023-02-21

## 2022-02-17 RX ORDER — HYDROCHLOROTHIAZIDE 25 MG/1
25 TABLET ORAL DAILY
Qty: 90 TABLET | Refills: 3 | Status: SHIPPED | OUTPATIENT
Start: 2022-02-17 | End: 2022-11-28

## 2022-02-17 RX ORDER — POTASSIUM CHLORIDE 1500 MG/1
20 TABLET, EXTENDED RELEASE ORAL DAILY
Qty: 90 TABLET | Refills: 3 | Status: SHIPPED | OUTPATIENT
Start: 2022-02-17 | End: 2023-02-21

## 2022-02-17 ASSESSMENT — ENCOUNTER SYMPTOMS
NAUSEA: 0
FREQUENCY: 0
SHORTNESS OF BREATH: 0
HEMATOCHEZIA: 0
HEMATURIA: 0
PALPITATIONS: 0
ABDOMINAL PAIN: 0
DIZZINESS: 0
ARTHRALGIAS: 0
CONSTIPATION: 0
PARESTHESIAS: 0
DYSURIA: 0
FEVER: 0
JOINT SWELLING: 0
DIARRHEA: 0
NERVOUS/ANXIOUS: 0
EYE PAIN: 0
COUGH: 0
MYALGIAS: 0
HEARTBURN: 0
CHILLS: 0
HEADACHES: 0
WEAKNESS: 0
SORE THROAT: 0

## 2022-02-17 ASSESSMENT — ACTIVITIES OF DAILY LIVING (ADL): CURRENT_FUNCTION: NO ASSISTANCE NEEDED

## 2022-02-17 NOTE — PROGRESS NOTES
"SUBJECTIVE:   Steven Duong is a 76 year old male who presents for Preventive Visit.    Patient has been advised of split billing requirements and indicates understanding: Yes  Are you in the first 12 months of your Medicare coverage?  No    Healthy Habits:     In general, how would you rate your overall health?  Excellent    Frequency of exercise:  4-5 days/week    Duration of exercise:  15-30 minutes    Do you usually eat at least 4 servings of fruit and vegetables a day, include whole grains    & fiber and avoid regularly eating high fat or \"junk\" foods?  No    Taking medications regularly:  Yes    Medication side effects:  None    Ability to successfully perform activities of daily living:  No assistance needed    Home Safety:  No safety concerns identified    Hearing Impairment:  Difficulty following a conversation in a noisy restaurant or crowded room and difficulty understanding soft or whispered speech    In the past 6 months, have you been bothered by leaking of urine?  No    In general, how would you rate your overall mental or emotional health?  Good      PHQ-2 Total Score: 1    Additional concerns today:  Yes    Do you feel safe in your environment? YES    Have you ever done Advance Care Planning? (For example, a Health Directive, POLST, or a discussion with a medical provider or your loved ones about your wishes): Yes, patient states has an Advance Care Planning document and will bring a copy to the clinic.       Fall risk  Fallen 2 or more times in the past year?: No  Any fall with injury in the past year?: No    Cognitive Screening   1) Repeat 3 items (Leader, Season, Table)      2) Clock draw:   NORMAL  3) 3 item recall:   Recalls 3 objects  Results: 3 items recalled: COGNITIVE IMPAIRMENT LESS LIKELY    Mini-CogTM Copyright KESHAWN Butcher. Licensed by the author for use in [facility]; reprinted with permission (mikie@.[facility]). All rights reserved.      Reviewed and updated as needed this " visit by clinical staff   Tobacco  Allergies  Meds  Problems  Med Hx  Surg Hx  Fam Hx  Soc   Hx        Reviewed and updated as needed this visit by Provider   Tobacco  Allergies  Meds  Problems  Med Hx  Surg Hx  Fam Hx         Social History     Tobacco Use     Smoking status: Never Smoker     Smokeless tobacco: Never Used   Substance Use Topics     Alcohol use: Yes     Alcohol/week: 11.7 standard drinks     Comment: 1-2 beers nightly     If you drink alcohol do you typically have >3 drinks per day or >7 drinks per week? Yes        AUDIT - Alcohol Use Disorders Identification Test - Reproduced from the World Health Organization Audit 2001 (Second Edition) 2/14/2022   1.  How often do you have a drink containing alcohol? 4 or more times a week   2.  How many drinks containing alcohol do you have on a typical day when you are drinking? 1 or 2   3.  How often do you have five or more drinks on one occasion? Never   4.  How often during the last year have you found that you were not able to stop drinking once you had started? Never   5.  How often during the last year have you failed to do what was normally expected of you because of drinking? Never   6.  How often during the last year have you needed a first drink in the morning to get yourself going after a heavy drinking session? Never   7.  How often during the last year have you had a feeling of guilt or remorse after drinking? Never   8.  How often during the last year have you been unable to remember what happened the night before because of your drinking? Never   9.  Have you or someone else been injured because of your drinking? No   10. Has a relative, friend, doctor or other health care worker been concerned about your drinking or suggested you cut down? No   TOTAL SCORE 4       Hyperlipidemia Follow-Up      Are you regularly taking any medication or supplement to lower your cholesterol?   Yes- Lipitor    Are you having muscle aches or other  side effects that you think could be caused by your cholesterol lowering medication?  No    Hypertension Follow-up      Do you check your blood pressure regularly outside of the clinic? No     Are you following a low salt diet? Yes    Are your blood pressures ever more than 140 on the top number (systolic) OR more   than 90 on the bottom number (diastolic), for example 140/90? No  BP Readings from Last 6 Encounters:   02/17/22 126/72   10/18/21 132/74   08/09/21 126/77   03/19/21 118/68   02/15/21 120/70   12/11/20 116/72         Current providers sharing in care for this patient include:   Patient Care Team:  Nash Walker MD as PCP - General  Garrett Gonzalez MD as MD (Urology)  Aurora Mckeon RN as Specialty Care Coordinator (Urology)  Josué Gutiérrez DO as MD (Family Practice)  Franky Oneal MD as MD (Urology)  Franky Oneal MD as Assigned Surgical Provider  Tania Gonzalez APRN CNP as Assigned PCP    The following health maintenance items are reviewed in Epic and correct as of today:  Health Maintenance Due   Topic Date Due     CMP  12/24/2019     FALL RISK ASSESSMENT  01/28/2021     COLORECTAL CANCER SCREENING  09/23/2021     DTAP/TDAP/TD IMMUNIZATION (3 - Td or Tdap) 01/12/2022     LIPID  01/26/2022     MICROALBUMIN  02/15/2022       Review of Systems   Constitutional: Negative for chills and fever.   HENT: Positive for hearing loss. Negative for congestion, ear pain and sore throat.    Eyes: Negative for pain and visual disturbance.   Respiratory: Negative for cough and shortness of breath.    Cardiovascular: Negative for chest pain, palpitations and peripheral edema.   Gastrointestinal: Negative for abdominal pain, constipation, diarrhea, heartburn, hematochezia and nausea.   Genitourinary: Positive for impotence. Negative for dysuria, frequency, genital sores, hematuria, penile discharge and urgency.   Musculoskeletal: Negative for arthralgias, joint swelling and myalgias.  "  Skin: Negative for rash.   Neurological: Negative for dizziness, weakness, headaches and paresthesias.   Psychiatric/Behavioral: Negative for mood changes. The patient is not nervous/anxious.          OBJECTIVE:   /72   Pulse 80   Temp 97.4  F (36.3  C) (Tympanic)   Resp 14   Ht 1.727 m (5' 8\")   Wt 78.5 kg (173 lb)   SpO2 99%   BMI 26.30 kg/m   Estimated body mass index is 26.3 kg/m  as calculated from the following:    Height as of this encounter: 1.727 m (5' 8\").    Weight as of this encounter: 78.5 kg (173 lb).  EXAM:   GENERAL: healthy, alert and no distress  EYES: Eyes grossly normal to inspection, PERRL and conjunctivae and sclerae normal  HENT: ear canals and TM's normal, nose and mouth without ulcers or lesions  NECK: no adenopathy, no asymmetry, masses, or scars and thyroid normal to palpation  RESP: lungs clear to auscultation - no rales, rhonchi or wheezes  BREAST: normal without masses, tenderness or nipple discharge and no palpable axillary masses or adenopathy  CV: regular rate and rhythm, normal S1 S2, no S3 or S4, no murmur, click or rub, no peripheral edema and peripheral pulses strong  ABDOMEN: soft, nontender, no hepatosplenomegaly, no masses and bowel sounds normal   (male): normal male genitalia without lesions or urethral discharge, no hernia  MS: no gross musculoskeletal defects noted, no edema  SKIN: no suspicious lesions or rashes  NEURO: Normal strength and tone, mentation intact and speech normal  PSYCH: mentation appears normal, affect normal/bright  LYMPH: no cervical, supraclavicular, axillary, or inguinal adenopathy    ASSESSMENT / PLAN:   Encounter for Medicare annual wellness exam      Hypertension goal BP (blood pressure) < 130/80  Controlled - continue medication(s).  - COMPREHENSIVE METABOLIC PANEL  - Albumin Random Urine Quantitative with Creat Ratio  - losartan (COZAAR) 50 MG tablet  Dispense: 90 tablet; Refill: 3  - amLODIPine (NORVASC) 5 MG tablet  Dispense: " "90 tablet; Refill: 3  - hydrochlorothiazide (HYDRODIURIL) 25 MG tablet  Dispense: 90 tablet; Refill: 3  - potassium chloride ER (KLOR-CON) 20 MEQ CR tablet  Dispense: 90 tablet; Refill: 3  - COMPREHENSIVE METABOLIC PANEL  - Albumin Random Urine Quantitative with Creat Ratio    Hyperlipidemia LDL goal <100  Stable - continue medication(s).  - COMPREHENSIVE METABOLIC PANEL  - Lipid panel reflex to direct LDL Fasting  - atorvastatin (LIPITOR) 10 MG tablet  Dispense: 90 tablet; Refill: 3  - COMPREHENSIVE METABOLIC PANEL  - Lipid panel reflex to direct LDL Fasting    Raynaud's phenomenon without gangrene  ongoing and should wear gloves when needed  - amLODIPine (NORVASC) 5 MG tablet  Dispense: 90 tablet; Refill: 3    Seasonal allergic rhinitis, unspecified trigger  Usually springtime to fall  - fluticasone (FLONASE) 50 MCG/ACT nasal spray  Dispense: 48 g; Refill: 2    Screen for colon cancer  Due for screening    h/o prostate cancer (H) - S/p prostatectomy at 54 yo (2001)  No new symptoms, will continue to monitor  - PSA, screen  - PSA, screen    Due for Tdap and will get at pharmacy    End of Life Planning:  Patient currently has an advanced directive: No.  I have verified the patient's ablity to prepare an advanced directive/make health care decisions.  Literature was provided to assist patient in preparing an advanced directive.    COUNSELING:  Reviewed preventive health counseling, as reflected in patient instructions    Estimated body mass index is 26.3 kg/m  as calculated from the following:    Height as of this encounter: 1.727 m (5' 8\").    Weight as of this encounter: 78.5 kg (173 lb).       reports that he has never smoked. He has never used smokeless tobacco.      Appropriate preventive services were discussed with this patient, including applicable screening as appropriate for cardiovascular disease, diabetes, osteopenia/osteoporosis, and glaucoma.  As appropriate for age/gender, discussed screening for " colorectal cancer, prostate cancer, breast cancer, and cervical cancer. Checklist reviewing preventive services available has been given to the patient.    Reviewed patients plan of care and provided an AVS. The Basic Care Plan (routine screening as documented in Health Maintenance) for Steven meets the Care Plan requirement. This Care Plan has been established and reviewed with the Patient.    Return in about 53 weeks (around 2/23/2023) for Annual Wellness Visit.           Joaquin Walker MD     [address] [address]  [employer]     Office: [phone]       Identified Health Risks:    The patient was counseled and encouraged to consider modifying their diet and eating habits. He was provided with information on recommended healthy diet options.  The patient was provided with written information regarding signs of hearing loss.

## 2022-02-17 NOTE — PATIENT INSTRUCTIONS
Patient Education   Personalized Prevention Plan  You are due for the preventive services outlined below.  Your care team is available to assist you in scheduling these services.  If you have already completed any of these items, please share that information with your care team to update in your medical record.  Health Maintenance Due   Topic Date Due     Comprehensive Metabolic Panel  12/24/2019     FALL RISK ASSESSMENT  01/28/2021     Flu Vaccine (1) 09/01/2021     Colorectal Cancer Screening  09/23/2021     ANNUAL REVIEW OF HM ORDERS  12/22/2021     Diptheria Tetanus Pertussis (DTAP/TDAP/TD) Vaccine (3 - Td or Tdap) 01/12/2022     Cholesterol Lab  01/26/2022     Kidney Microalbumin Urine Test  02/15/2022       Understanding USDA MyPlate  The USDA has guidelines to help you make healthy food choices. These are called MyPlate. MyPlate shows the food groups that make up healthy meals using the image of a place setting. Before you eat, think about the healthiest choices for what to put on your plate or in your cup or bowl. To learn more about building a healthy plate, visit www.choosemyplate.gov.    The food groups    Fruits. Any fruit or 100% fruit juice counts as part of the Fruit Group. Fruits may be fresh, canned, frozen, or dried, and may be whole, cut-up, or pureed. Make 1/2 of your plate fruits and vegetables.    Vegetables. Any vegetable or 100% vegetable juice counts as a member of the Vegetable Group. Vegetables may be fresh, frozen, canned, or dried. They can be served raw or cooked and may be whole, cut-up, or mashed. Make 1/2 of your plate fruits and vegetables.    Grains. All foods made from grains are part of the Grains Group. These include wheat, rice, oats, cornmeal, and barley. Grains are often used to make foods such as bread, pasta, oatmeal, cereal, tortillas, and grits. Grains should be no more than 1/4 of your plate. At least half of your grains should be whole grains.    Protein. This group  includes meat, poultry, seafood, beans and peas, eggs, processed soy products (such as tofu), nuts (including nut butters), and seeds. Make protein choices no more than 1/4 of your plate. Meat and poultry choices should be lean or low fat.    Dairy. The Dairy Group includes all fluid milk products and foods made from milk that contain calcium, such as yogurt and cheese. (Foods that have little calcium, such as cream, butter, and cream cheese, are not part of this group.) Most dairy choices should be low-fat or fat-free.    Oils. Oils aren't a food group, but they do contain essential nutrients. However it's important to watch your intake of oils. These are fats that are liquid at room temperature. They include canola, corn, olive, soybean, vegetable, and sunflower oil. Foods that are mainly oil include mayonnaise, certain salad dressings, and soft margarines. You likely already get your daily oil allowance from the foods you eat.  Things to limit  Eating healthy also means limiting these things in your diet:       Salt (sodium). Many processed foods have a lot of sodium. To keep sodium intake down, eat fresh vegetables, meats, poultry, and seafood when possible. Purchase low-sodium, reduced-sodium, or no-salt-added food products at the store. And don't add salt to your meals at home. Instead, season them with herbs and spices such as dill, oregano, cumin, and paprika. Or try adding flavor with lemon or lime zest and juice.    Saturated fat. Saturated fats are most often found in animal products such as beef, pork, and chicken. They are often solid at room temperature, such as butter. To reduce your saturated fat intake, choose leaner cuts of meat and poultry. And try healthier cooking methods such as grilling, broiling, roasting, or baking. For a simple lower-fat swap, use plain nonfat yogurt instead of mayonnaise when making potato salad or macaroni salad.    Added sugars. These are sugars added to foods. They are  in foods such as ice cream, candy, soda, fruit drinks, sports drinks, energy drinks, cookies, pastries, jams, and syrups. Cut down on added sugars by sharing sweet treats with a family member or friend. You can also choose fruit for dessert, and drink water or other unsweetened beverages.     [employer] [place] last reviewed this educational content on 6/1/2020 2000-2021 [employer]. All rights reserved. This information is not intended as a substitute for professional medical care. Always follow your healthcare professional's instructions.          Signs of Hearing Loss      Hearing much better with one ear can be a sign of hearing loss.   Hearing loss is a problem shared by many people. In fact, it is one of the most common health problems, particularly as people age. Most people age 65 and older have some hearing loss. By age 80, almost everyone does. Hearing loss often occurs slowly over the years. So you may not realize your hearing has gotten worse.  Have your hearing checked  Call your healthcare provider if you:    Have to strain to hear normal conversation    Have to watch other people s faces very carefully to follow what they re saying    Need to ask people to repeat what they ve said    Often misunderstand what people are saying    Turn the volume of the television or radio up so high that others complain    Feel that people are mumbling when they re talking to you    Find that the effort to hear leaves you feeling tired and irritated    Notice, when using the phone, that you hear better with one ear than the other  [employer] [place] last reviewed this educational content on 1/1/2020 2000-2021 [employer]. All rights reserved. This information is not intended as a substitute for professional medical care. Always follow your healthcare professional's instructions.

## 2022-02-18 LAB
ALBUMIN SERPL-MCNC: 3.6 G/DL (ref 3.4–5)
ALP SERPL-CCNC: 52 U/L (ref 40–150)
ALT SERPL W P-5'-P-CCNC: 26 U/L (ref 0–70)
ANION GAP SERPL CALCULATED.3IONS-SCNC: 7 MMOL/L (ref 3–14)
AST SERPL W P-5'-P-CCNC: 16 U/L (ref 0–45)
BILIRUB SERPL-MCNC: 1.1 MG/DL (ref 0.2–1.3)
BUN SERPL-MCNC: 22 MG/DL (ref 7–30)
CALCIUM SERPL-MCNC: 9.1 MG/DL (ref 8.5–10.1)
CHLORIDE BLD-SCNC: 102 MMOL/L (ref 94–109)
CHOLEST SERPL-MCNC: 150 MG/DL
CO2 SERPL-SCNC: 26 MMOL/L (ref 20–32)
CREAT SERPL-MCNC: 1.07 MG/DL (ref 0.66–1.25)
CREAT UR-MCNC: 259 MG/DL
FASTING STATUS PATIENT QL REPORTED: YES
GFR SERPL CREATININE-BSD FRML MDRD: 72 ML/MIN/1.73M2
GLUCOSE BLD-MCNC: 111 MG/DL (ref 70–99)
HDLC SERPL-MCNC: 59 MG/DL
LDLC SERPL CALC-MCNC: 82 MG/DL
MICROALBUMIN UR-MCNC: 25 MG/L
MICROALBUMIN/CREAT UR: 9.65 MG/G CR (ref 0–17)
NONHDLC SERPL-MCNC: 91 MG/DL
POTASSIUM BLD-SCNC: 4.2 MMOL/L (ref 3.4–5.3)
PROT SERPL-MCNC: 7.2 G/DL (ref 6.8–8.8)
PSA SERPL-MCNC: 0.02 UG/L (ref 0–4)
SODIUM SERPL-SCNC: 135 MMOL/L (ref 133–144)
TRIGL SERPL-MCNC: 47 MG/DL

## 2022-02-19 NOTE — RESULT ENCOUNTER NOTE
Dear Chivo,    Here is a summary of your recent test results:  -PSA (prostate specific antigen) test is essentially not detected.  This indicates a low likelihood of prostate cancer.  ADVISE: rechecking this in 1 year.  -Cholesterol levels are at your goal levels.  ADVISE: continuing your medication, a regular exercise program with at least 150 minutes of aerobic exercise per week, and eating a low saturated fat/low carbohydrate diet.  Also, you should recheck this fasting cholesterol panel in 12 months.  -Liver and gallbladder tests (ALT,AST, Alk phos,bilirubin) are normal.  -Kidney function (GFR) is normal.  -Sodium is normal.  -Potassium is normal.  -Calcium is normal.  -Glucose is slight elevated and may be a sign of early diabetes (prediabetes). ADVISE:: eating a low carbohydrate diet, exercising, trying to lose weight (if necessary) and rechecking your glucose level in 12 months.  -Microalbumin (urine protein) test is normal.  ADVISE: rechecking this annually.    For additional lab test information, www.[email or web address] is a very good reference.    In addition, here is a list of due or overdue Health Maintenance reminders:  Colorectal Cancer Screening due on 09/23/2021  Diptheria Tetanus Pertussis (DTAP/TDAP/TD) Vaccine(3 - Td or Tdap) due on 01/12/2022    Please call us at [phone] (or use t-Art) to address the above recommendations if needed.           Thank you very much for trusting me and [facility].     Have a peaceful day.    Healthy regards,  Joaquin Walker MD

## 2022-03-23 DIAGNOSIS — I10 HYPERTENSION GOAL BP (BLOOD PRESSURE) < 130/80: ICD-10-CM

## 2022-03-24 RX ORDER — HYDROCHLOROTHIAZIDE 25 MG/1
TABLET ORAL
Qty: 90 TABLET | Refills: 3 | OUTPATIENT
Start: 2022-03-24

## 2022-03-24 NOTE — TELEPHONE ENCOUNTER
RX denied. Should have refills on file.     Desiree Perales RN  [facility]

## 2022-05-05 ENCOUNTER — MYC MEDICAL ADVICE (OUTPATIENT)
Dept: FAMILY MEDICINE | Facility: CLINIC | Age: 77
End: 2022-05-05
Payer: MEDICARE

## 2022-05-05 NOTE — TELEPHONE ENCOUNTER
"FYI-E visit     Situation: Patient calling and asking if he can have a prescription for his eyes.     Background:  symptoms started Nirav may 1st  Tested negative for Covid from a home test 5/2/22 and 5/3/22  Patient has allergies  Does not like Claritin gives him a headache     Assessment:  Has been taking Mucinex daily since Sunday   No fever, no chills   Coughing in the morning started yesterday 5/4/22 brief dry cough once an hour  Ache in throat from post nasal drip  Constant Nasal drainage started clear now yellow/green   denied shortness of breath at rest or walking    Both Eyes watering during the day and crusted shut in the morning.   Says both his eyes look \" a little pink\"-sclera  No chest  Pain or heart palpation   No headache denied facial pressure     Recommendations:  Use a warm damp clot on each eye  3 times days, new cloth each time/eacch eye   Try not rub eyes, wipe with clean tissue  Mucinex as directed    Red flag symptoms   Continue flonase nasal spray   Drink plenty of fluids   Minitor for  Worsening symptoms, cough, fever, SOB, chest pain     instructed on how to do an E visit  Via MyChart-instuctions to call back if not able to complete and if he is not getting better.     Lin ZIMMERMAN RN   [facility] Triage       "

## 2022-05-06 ENCOUNTER — TELEPHONE (OUTPATIENT)
Dept: FAMILY MEDICINE | Facility: CLINIC | Age: 77
End: 2022-05-06
Payer: MEDICARE

## 2022-05-06 NOTE — TELEPHONE ENCOUNTER
Reason for Call:  Other appointment    Detailed comments: see rn triage from 5/5 head cold poss pink eye    Phone Number Patient can be reached at: Home number on file [phone] (home)    Best Time: any    Can we leave a detailed message on this number? YES     Patient is currently scheduled on 5/11 but wants to get in sooner.     Call taken on 5/6/2022 at 8:33 AM by Veena Cox

## 2022-05-06 NOTE — TELEPHONE ENCOUNTER
Rescheduled appointment to 5/9/22 @ 9am with pcp. As we had no openings today in [facility] clinic for patient he's wondering if pcp could prescribe something for his eyes while he waits for his appointment. See triage note and advise.     # [phone], ok to leave detailed message.     Riky Luis

## 2022-05-09 ENCOUNTER — OFFICE VISIT (OUTPATIENT)
Dept: FAMILY MEDICINE | Facility: CLINIC | Age: 77
End: 2022-05-09
Payer: MEDICARE

## 2022-05-09 VITALS
WEIGHT: 177 LBS | TEMPERATURE: 97.2 F | BODY MASS INDEX: 26.83 KG/M2 | SYSTOLIC BLOOD PRESSURE: 104 MMHG | OXYGEN SATURATION: 99 % | HEIGHT: 68 IN | DIASTOLIC BLOOD PRESSURE: 56 MMHG | HEART RATE: 89 BPM

## 2022-05-09 DIAGNOSIS — J06.9 UPPER RESPIRATORY TRACT INFECTION, UNSPECIFIED TYPE: Primary | ICD-10-CM

## 2022-05-09 PROCEDURE — 99213 OFFICE O/P EST LOW 20 MIN: CPT | Performed by: FAMILY MEDICINE

## 2022-05-09 NOTE — PROGRESS NOTES
"  Assessment & Plan   Upper respiratory tract infection, unspecified type  Symptoms suggestive of viral syndrome and will continue to treat symptoms.        Return in about 1 week (around 5/16/2022) for symptoms failing to improve or sooner if worsening.      Joaquin Walker MD      [address] [address]  [employer]   Office: [phone]       Eitan Waldron is a 77 year old who presents for the following health issues     History of Present Illness       Hypertension: He presents for follow up of hypertension.  He does not check blood pressure  regularly outside of the clinic. Outpatient blood pressures have not been over 140/90. He does not follow a low salt diet.     He eats 0-1 servings of fruits and vegetables daily.He consumes 1 sweetened beverage(s) daily.He exercises with enough effort to increase his heart rate 30 to 60 minutes per day.  He exercises with enough effort to increase his heart rate 5 days per week.   He is taking medications regularly.     Acute Illness  Acute illness concerns: eyes, runny nose, sore throat, cough  Onset/Duration: 9 days  Symptoms:  Fever: no  Chills/Sweats: YES, sweats  Headache (location?): YES  Sinus Pressure: YES  Conjunctivitis:  YES - initially   Ear Pain: YES: both sides - feeling pressure  Rhinorrhea: YES  Congestion: YES  Sore Throat: YES  Cough: YES  Wheeze: no  Decreased Appetite: no  Nausea: yes  Vomiting: no  Diarrhea: yes, a couple of days ago but its been better now  Dysuria/Freq.: no  Dysuria or Hematuria: no  Fatigue/Achiness: YES  Sick/Strep Exposure: no  Therapies tried and outcome: mucinex, tylenol    covid test was negative x 2     Review of Systems         Objective    /56 (BP Location: Left arm, Cuff Size: Adult Regular)   Pulse 89   Temp 97.2  F (36.2  C) (Tympanic)   Ht 1.727 m (5' 8\")   Wt 80.3 kg (177 lb)   SpO2 99%   BMI 26.91 kg/m    Body mass index is 26.91 kg/m .  Physical " Exam   GENERAL: no acute distress  EYES: Conjunctiva are mildly injected, no discharge.  EARS: Left TM -no erythema, no effusion,  not bulged.               Right TM -no erythema, no effusion,  not bulged.  NOSE: no discharge, no sinus tenderness  THROAT: no erythema, no exudate, no lesions  NECK: supple, no adenopathy.  CARDIAC: regular rate and rhythm, no murmur  RESP: clear, no wheezing, no rales, no rhonchi  ABD: soft, no distension, no tenderness  SKIN: No rashes

## 2022-05-09 NOTE — TELEPHONE ENCOUNTER
Closing encounter,patient was seen in the office today.      Lin ZIMMERMAN RN   [facility] Triage

## 2022-07-25 ENCOUNTER — MYC MEDICAL ADVICE (OUTPATIENT)
Dept: FAMILY MEDICINE | Facility: CLINIC | Age: 77
End: 2022-07-25

## 2022-07-27 NOTE — TELEPHONE ENCOUNTER
Pt noted to have Raynaud's, routing to PCP to review and advise if needing vascular med workup or clinic visit.    Garrett FUENTES RN   [facility]

## 2022-09-03 ENCOUNTER — ANCILLARY PROCEDURE (OUTPATIENT)
Dept: GENERAL RADIOLOGY | Facility: CLINIC | Age: 77
End: 2022-09-03
Attending: PHYSICIAN ASSISTANT
Payer: MEDICARE

## 2022-09-03 ENCOUNTER — OFFICE VISIT (OUTPATIENT)
Dept: URGENT CARE | Facility: URGENT CARE | Age: 77
End: 2022-09-03
Payer: MEDICARE

## 2022-09-03 VITALS
HEART RATE: 66 BPM | RESPIRATION RATE: 18 BRPM | TEMPERATURE: 97.8 F | WEIGHT: 177 LBS | OXYGEN SATURATION: 97 % | BODY MASS INDEX: 26.91 KG/M2 | DIASTOLIC BLOOD PRESSURE: 70 MMHG | SYSTOLIC BLOOD PRESSURE: 132 MMHG

## 2022-09-03 DIAGNOSIS — M25.532 LEFT WRIST PAIN: Primary | ICD-10-CM

## 2022-09-03 DIAGNOSIS — M25.532 LEFT WRIST PAIN: ICD-10-CM

## 2022-09-03 PROCEDURE — 99213 OFFICE O/P EST LOW 20 MIN: CPT | Performed by: PHYSICIAN ASSISTANT

## 2022-09-03 PROCEDURE — 73110 X-RAY EXAM OF WRIST: CPT | Mod: TC | Performed by: RADIOLOGY

## 2022-09-03 RX ORDER — PREDNISONE 20 MG/1
40 TABLET ORAL DAILY
Qty: 10 TABLET | Refills: 0 | Status: SHIPPED | OUTPATIENT
Start: 2022-09-03 | End: 2022-09-08

## 2022-09-03 NOTE — PROGRESS NOTES
Assessment & Plan     Left wrist pain  Suspect carpal tunnel syndrome.  X-ray today is negative for acute fracture or dislocation.  Final radiologist reading is pending.  Trial of oral prednisone.  Also fitted with a wrist brace today.  Orthopedic referral.  Might benefit from local steroid injection if symptoms not improving as anticipated.  Follow-up with Ortho as discussed.  Patient agrees with the plan.  - XR Wrist Left G/E 3 Views  - predniSONE (DELTASONE) 20 MG tablet  Dispense: 10 tablet; Refill: 0  - Wrist/Arm/Hand Supplies Order for DME - ONLY FOR DME  - Orthopedic  Referral         Return in about 1 week (around 9/10/2022) for follow up with Orthopedic for recheck.    Nohemy Flores PA-C  [facility] ALECIA Waldron is a 77 year old male who presents to clinic today for the following health issues:  Chief Complaint   Patient presents with     Wrist Pain     Left X1 week. No fall. Was doing some weights 30lb Started hurting after that. No swelling. Pain wakes him up at night.     HPI    Patient is presenting to urgent care today with a complaint of left wrist pain.  Ongoing symptoms for the past 1 week.  Reports he started doing some 30 pound weight cross body curls a while back.  Not sure if this is what triggered his symptoms.  He denies any other trauma or injury. No falls. Has tried resting the wrist for about a week now, no improvement.  He reports some intermittent numbness or tingling in the left hand fingers.      Review of Systems  Constitutional, HEENT, cardiovascular, pulmonary, GI, , musculoskeletal, neuro, skin, endocrine and psych systems are negative, except as otherwise noted.      Objective    /70   Pulse 66   Temp 97.8  F (36.6  C) (Oral)   Resp 18   Wt 80.3 kg (177 lb)   SpO2 97%   BMI 26.91 kg/m    Physical Exam   GENERAL: healthy, alert and no distress  MS: no gross musculoskeletal defects noted, no edema, TTP volar aspect  median nerve region. Positive tinel's.    Xray left wrist - Reviewed and interpreted by me.  Negative for acute fractures or dislocation.  Final Radiologist reading is pending.

## 2022-09-19 ENCOUNTER — NURSE TRIAGE (OUTPATIENT)
Dept: FAMILY MEDICINE | Facility: CLINIC | Age: 77
End: 2022-09-19

## 2022-09-19 NOTE — TELEPHONE ENCOUNTER
Provider Response to 2nd Level Triage Request    I have reviewed the RN documentation. My recommendation is:  Face To Face Visit. has apot scheduled tomorro -= agree with the plan.   Provider Response to 2nd Level Triage Request    I have reviewed the RN documentation. My recommendation is:  Appointment as planned

## 2022-09-19 NOTE — TELEPHONE ENCOUNTER
"S-(situation): Pt calling in regarding dizziness.     B-(background): States dizziness x1 week, also has sinus pressure and ear pressure.     A-(assessment): Pt states BP was 106/65, unsure what HR was, unable to check at this time per pt. No SOB or difficulty breathing. No N/V or diarrhea, no black or red color in stool, no headache, no hx of diabetes. Pt states if he walks \"more than a couple of feet\" or walks upstairs he needs to have support to walk/stand, \"it is like a swaying motion.\"       R-(recommendations): Reviewed advice under care tab, will route to PCP to advise on in clinic, UC, or ED. Pt was scheduled by central scheduling for appt on 9/20/22, will route to PCP to review and advise.       Reason for Disposition    SEVERE dizziness (e.g., unable to stand, requires support to walk, feels like passing out now)    Additional Information    Negative: SEVERE difficulty breathing (e.g., struggling for each breath, speaks in single words)    Negative: Shock suspected (e.g., cold/pale/clammy skin, too weak to stand, low BP, rapid pulse)    Negative: Difficult to awaken or acting confused (e.g., disoriented, slurred speech)    Negative: Fainted, and still feels dizzy afterwards    Negative: Overdose (accidental or intentional) of medications    Negative: New neurologic deficit that is present now: * Weakness of the face, arm, or leg on one side of the body * Numbness of the face, arm, or leg on one side of the body * Loss of speech or garbled speech    Negative: Heart beating < 50 beats per minute OR > 140 beats per minute    Negative: Sounds like a life-threatening emergency to the triager    Negative: Chest pain    Negative: Rectal bleeding, bloody stool, or tarry-black stool    Negative: Vomiting is main symptom    Negative: Diarrhea is main symptom    Negative: Headache is main symptom    Negative: Heat exhaustion suspected (i.e., dehydration from heat exposure)    Negative: Patient states that they are " having an anxiety or panic attack    Negative: Dizziness from low blood sugar (i.e., < 60 mg/dl or 3.5 mmol/l)    Protocols used: DIZZINESS-A-RENEE PRETTY RN

## 2022-09-20 ENCOUNTER — OFFICE VISIT (OUTPATIENT)
Dept: FAMILY MEDICINE | Facility: CLINIC | Age: 77
End: 2022-09-20
Payer: MEDICARE

## 2022-09-20 VITALS
WEIGHT: 173 LBS | HEIGHT: 68 IN | HEART RATE: 80 BPM | BODY MASS INDEX: 26.22 KG/M2 | SYSTOLIC BLOOD PRESSURE: 124 MMHG | OXYGEN SATURATION: 97 % | TEMPERATURE: 97.2 F | DIASTOLIC BLOOD PRESSURE: 70 MMHG

## 2022-09-20 DIAGNOSIS — C61 PROSTATE CANCER (H): ICD-10-CM

## 2022-09-20 DIAGNOSIS — R42 DIZZINESS: Primary | ICD-10-CM

## 2022-09-20 LAB
ANION GAP SERPL CALCULATED.3IONS-SCNC: 10 MMOL/L (ref 3–14)
BUN SERPL-MCNC: 22 MG/DL (ref 7–30)
CALCIUM SERPL-MCNC: 9.3 MG/DL (ref 8.5–10.1)
CHLORIDE BLD-SCNC: 96 MMOL/L (ref 94–109)
CO2 SERPL-SCNC: 26 MMOL/L (ref 20–32)
CREAT SERPL-MCNC: 1 MG/DL (ref 0.66–1.25)
ERYTHROCYTE [DISTWIDTH] IN BLOOD BY AUTOMATED COUNT: 12.2 % (ref 10–15)
GFR SERPL CREATININE-BSD FRML MDRD: 78 ML/MIN/1.73M2
GLUCOSE BLD-MCNC: 118 MG/DL (ref 70–99)
HCT VFR BLD AUTO: 48.3 % (ref 40–53)
HGB BLD-MCNC: 17 G/DL (ref 13.3–17.7)
MCH RBC QN AUTO: 31.3 PG (ref 26.5–33)
MCHC RBC AUTO-ENTMCNC: 35.2 G/DL (ref 31.5–36.5)
MCV RBC AUTO: 89 FL (ref 78–100)
PLATELET # BLD AUTO: 194 10E3/UL (ref 150–450)
POTASSIUM BLD-SCNC: 3.4 MMOL/L (ref 3.4–5.3)
RBC # BLD AUTO: 5.43 10E6/UL (ref 4.4–5.9)
SODIUM SERPL-SCNC: 132 MMOL/L (ref 133–144)
WBC # BLD AUTO: 5.9 10E3/UL (ref 4–11)

## 2022-09-20 PROCEDURE — 99213 OFFICE O/P EST LOW 20 MIN: CPT | Performed by: NURSE PRACTITIONER

## 2022-09-20 PROCEDURE — 36415 COLL VENOUS BLD VENIPUNCTURE: CPT | Performed by: NURSE PRACTITIONER

## 2022-09-20 PROCEDURE — 85027 COMPLETE CBC AUTOMATED: CPT | Performed by: NURSE PRACTITIONER

## 2022-09-20 PROCEDURE — 80048 BASIC METABOLIC PNL TOTAL CA: CPT | Performed by: NURSE PRACTITIONER

## 2022-09-20 NOTE — PROGRESS NOTES
"  Assessment & Plan     Dizziness  Check labs to ensure electrolytes in range. Appears to be more dizziness than a vestibular issue. Patient will let us know if changing. Will have him focus on hydration, slow position change.   - CBC with Platelets  - Basic metabolic panel  (Ca, Cl, CO2, Creat, Gluc, K, Na, BUN)  - CBC with Platelets  - Basic metabolic panel  (Ca, Cl, CO2, Creat, Gluc, K, Na, BUN)    Prostate cancer (H)  Follows urology        Review of the result(s) of each unique test - lab  Ordering of each unique test       BMI:   Estimated body mass index is 26.3 kg/m  as calculated from the following:    Height as of this encounter: 1.727 m (5' 8\").    Weight as of this encounter: 78.5 kg (173 lb).       See Patient Instructions    Return in about 2 weeks (around 10/4/2022) for symptoms failing to improve or worsening.    Tania Senior [facility] PRIOR MARK Waldron is a 77 year old, presenting for the following health issues:  Balance/ Vestibular      History of Present Illness       Reason for visit:  Balance issues  Symptom onset:  1-2 weeks ago  Symptoms include:  Dizenese  Symptom intensity:  Moderate  Symptom progression:  Improving  Had these symptoms before:  Yes  Has tried/received treatment for these symptoms:  Yes  Previous treatment was successful:  Yes  Prior treatment description:  Diaretics  What makes it worse:  No  What makes it better:  No    He eats 2-3 servings of fruits and vegetables daily.He consumes 1 sweetened beverage(s) daily.He exercises with enough effort to increase his heart rate 30 to 60 minutes per day.  He exercises with enough effort to increase his heart rate 5 days per week.   He is taking medications regularly.       Dizziness  Onset/Duration: started just over a week ago   Description:   Do you feel faint: No  Does it feel like the surroundings (bed, room) are moving: No  Unsteady/off balance: YES  Have you passed out or fallen: " "No  Intensity: moderate  Progression of Symptoms: same  Accompanying Signs & Symptoms:  Heart palpitations or chest pain: No  Nausea, vomiting: YES, nausea when it first started  Weakness or lack of coordination in arms or legs: No  Vision or speech changes: No  Numbness or tingling: No  Ringing in ears (Tinnitus): No  Hearing Loss: No  History:   Head trauma/concussion history: No  Previous similar symptoms: YES, had this several years ago when he had an inner ear issue  Recent bleeding history: No  Any new medications (BP?): No  Precipitating factors:   Worse with activity: YES  Worse with head movement: YES  Alleviating factors:   Does staying in a fixed position give relief: YES  Therapies tried and outcome: None      Improving some from earlier in the week. Sudden movements will trigger dizziness.     Has not felt congested or sinus symptoms. Tried Mucinex and did not see any improvment. He has had some drainage going on.     1 year anniversary of daughter's passing is coming up. Feeling a bit stressed related to that. Wondering if anxiety could be a component of symptoms.     Review of Systems   Constitutional, HEENT, cardiovascular, pulmonary, GI, , musculoskeletal, neuro, skin, endocrine and psych systems are negative, except as otherwise noted.      Objective    /70 (BP Location: Left arm, Cuff Size: Adult Regular)   Pulse 80   Temp 97.2  F (36.2  C) (Tympanic)   Ht 1.727 m (5' 8\")   Wt 78.5 kg (173 lb)   SpO2 97%   BMI 26.30 kg/m    Body mass index is 26.3 kg/m .  Physical Exam   GENERAL: healthy, alert and no distress  NECK: no adenopathy, no asymmetry, masses, or scars and thyroid normal to palpation  RESP: lungs clear to auscultation - no rales, rhonchi or wheezes  CV: regular rate and rhythm, normal S1 S2, no S3 or S4, no murmur, click or rub, no peripheral edema and peripheral pulses strong  ABDOMEN: soft, nontender, no hepatosplenomegaly, no masses and bowel sounds normal  MS: no gross " musculoskeletal defects noted, no edema          BERONICA Fernandez     [address] [address]  [email or web address]   Office: [phone]

## 2022-09-21 ENCOUNTER — TELEPHONE (OUTPATIENT)
Dept: FAMILY MEDICINE | Facility: CLINIC | Age: 77
End: 2022-09-21

## 2022-09-21 NOTE — TELEPHONE ENCOUNTER
Chart check, awaiting provider response.     Sodium value was 132 on  9/20/22    Lin ZIMMERMAN RN   [facility] Triage

## 2022-09-21 NOTE — TELEPHONE ENCOUNTER
Patient calls, he had appointment yesterday with Tania Senior for dizziness. He noticed that his labs on Mychart showed sodium was slightly low and still not feeling better. He has had low sodium in the past after colonoscopy that required ER visit and asks for recommendations. Patient states that he is not feeling weak, but feels foggy from the dizziness. He thought dizziness was from his Meniere's disease, but it has never lasted this long before.     No notes recorded by provider for lab results. Routed to provider and nursing team for follow-up.     Kavya Hernandez RN  [facility]

## 2022-09-22 NOTE — TELEPHONE ENCOUNTER
Called and spoke with patient.     Dizziness is getting better each day. Wants to give it a few more days.     Will call back Monday if not improving and wanting steroids or vestibular therapy.     Desiree Perales RN  [facility]

## 2022-09-22 NOTE — TELEPHONE ENCOUNTER
Labs really look ok, sodium is only just under normal so I would doubt that is the culprit.     Possible it could be the meniere's-we could try a course of oral steroids and see if helpful and/or have him see vestibular therapy as a next step.    Please call and see what his preference is.    Tania Senior, CNP

## 2022-09-26 ENCOUNTER — MYC MEDICAL ADVICE (OUTPATIENT)
Dept: FAMILY MEDICINE | Facility: CLINIC | Age: 77
End: 2022-09-26

## 2022-09-26 DIAGNOSIS — R42 DIZZINESS: Primary | ICD-10-CM

## 2022-09-27 NOTE — TELEPHONE ENCOUNTER
Please see my chart message below     Please review and advise     Thank you     Brisa Silveira RN, BSN  [facility] Triage

## 2022-10-20 ENCOUNTER — HOSPITAL ENCOUNTER (OUTPATIENT)
Dept: PHYSICAL THERAPY | Facility: CLINIC | Age: 77
Setting detail: THERAPIES SERIES
Discharge: HOME | End: 2022-10-20
Attending: FAMILY MEDICINE
Payer: MEDICARE

## 2022-10-20 DIAGNOSIS — R42 DIZZINESS: ICD-10-CM

## 2022-10-20 PROCEDURE — 97112 NEUROMUSCULAR REEDUCATION: CPT | Mod: GP | Performed by: PHYSICAL THERAPIST

## 2022-10-20 PROCEDURE — 97161 PT EVAL LOW COMPLEX 20 MIN: CPT | Mod: GP | Performed by: PHYSICAL THERAPIST

## 2022-10-20 NOTE — PROGRESS NOTES
10/20/22 0854   Quick Adds   Quick Adds Vestibular Eval;Certification   Type of Visit Initial OP PT Evaluation   General Information   Start of Care Date 10/20/22   Referring Physician Nash Walker MD   Orders Evaluate and Treat as Indicated   Order Date 09/28/22   Medical Diagnosis Dizziness (R42)   Onset of illness/injury or Date of Surgery 09/28/22   Surgical/Medical history reviewed Yes   Pertinent history of current vestibular problem (include personal factors and/or comorbidities that impact the POC)  Hearing loss   Hearing Loss Comments complete hearing loss L   Pertinent history of current problem (include personal factors and/or comorbidities that impact the POC) Chivo is a 78 y/o male with h/o meniere's disease presenting to PT for evaluation and treatment of dizziness following a recent attack. He reports onset of dizziness (lightheadedness), foggy brain feel and gait instability about a month ago. Meniere's attacks in the past have lasted only a few hours vs this one that has lasted nearly a month. He has had gradual improvement. Initially he was unable to walk in a straight line, struggling especially in the dark. Gait has significantly improved and he no longer feels the need to hold onto walls when he gets up in the middle of the night in the dark. He denies falls. Feels his balance is back to baseline. He initially felt that his vision was bouncy but this also has resolved. He feels 95% back to baseline. His main goal is to learn what he can do with future attacks to reduce the duration of symptoms with future attacks.   Pertinent Visual History  no vision changes   Prior level of function comment indep mobility and self cares without AD. walk on treadmill and biking for exercise   Patient role/Employment history Retired   Living environment [place]  (lives with spouse)   Home/Community Accessibility Comments no concerns   Current Assistive Devices   (none)   Patient/Family Goals  Statement learn what to do to manage future episodes   Fall Risk Screen   Fall screen completed by PT   Have you fallen 2 or more times in the past year? No   Have you fallen and had an injury in the past year? No   Is patient a fall risk? No   Abuse Screen (yes response referral indicated)   Feels Unsafe at Home or Work/School no   Feels Threatened by Someone no   Does Anyone Try to Keep You From Having Contact with Others or Doing Things Outside Your Home? no   Physical Signs of Abuse Present no   Functional Scales   Functional Scales and Outcomes DHI 24/100   Pain   Pain comments R neck, working with chiro, massage and is improved. no HA. no h/o neck injury or surgery   Cognitive Status Examination   Orientation orientation to person, place and time   Level of Consciousness alert   Follows Commands and Answers Questions 100% of the time   Personal Safety and Judgment intact   Posture   Posture Forward head position   Bed Mobility   Bed Mobility Comments independent   Transfer Skills   Transfer Comments independent   Gait   Gait Comments WNL gait pattern, good speed and recip arm swing   Balance Special Tests   Balance Special Tests Modified CTSIB Conditions   Balance Special Tests Modified CTSIB Conditions   Condition 1, seconds 30 Seconds   Condition 2, seconds 30 Seconds   Condition 4, seconds 30 Seconds   Condition 5, seconds 30 Seconds   Sensory Examination   Sensory Perception no deficits were identified   Sensory Perception Comments pt denies numbness and tingling in LEs, sensation not formally tested   Oculomotor Exam   Smooth Pursuit Normal   Saccades Normal   VOR Abnormal   Rapid Head Thrust Corrective Saccade L head thrust   Infrared Goggle Exam or Frenzel Lense Exam   Vestibular Suppressant in Last 24 Hours? No   Exam completed with Infrared Goggles   Spontaneous Nystagmus Negative   Gaze Evoked Nystagmus Negative   Head Shake Horizontal Nystagmus Horizontal R   Dynamic Visual Acuity (DVA)   Static  Acuity (LogMar) 20/20   Horizontal Head Movement at 2 Hz (LogMar) 20/80   DVA Comments 6 line loss, abnormal   Planned Therapy Interventions   Planned Therapy Interventions neuromuscular re-education   Planned Therapy Interventions Comment vestibular rehab   Clinical Impression   Criteria for Skilled Therapeutic Interventions Met yes, treatment indicated   PT Diagnosis dizziness with s/s UVL, hyopfunction on L with h/o meniere's disease   Influenced by the following impairments intermittent sense of dizziness, impaired VOR/DVA   Functional limitations due to impairments household tasks, walking, rapid movements   Clinical Presentation Stable/Uncomplicated   Clinical Decision Making (Complexity) Low complexity   Therapy Frequency 1 time/week   Predicted Duration of Therapy Intervention (days/wks) 3 weeks   Risk & Benefits of therapy have been explained Yes   Patient, Family & other staff in agreement with plan of care Yes   Education Assessment   Barriers to Learning Hearing   GOALS   PT Eval Goals 1;2   Goal 1   Goal Identifier DVA   Goal Description Chivo will have improved gaze stability based on DVA testing with no greater than 3 line loss from static at 2 hz head movement to reduced sensaiton of dizziness as he moves about his home and community.   Target Date 11/10/22   Goal 2   Goal Identifier HEP   Goal Description Chivo will be independent in HEP for long term management of his condition, for successful adaptation following meniere's attacks.   Target Date 11/10/22   Total Evaluation Time   PT Eval, Low Complexity Minutes (48680) 42   Therapy Certification   Certification date from 10/20/22   Certification date to 11/09/22   Medical Diagnosis Dizziness (R42)

## 2022-10-20 NOTE — PROGRESS NOTES
[facility]                                                                                   OUTPATIENT PHYSICAL THERAPY FUNCTIONAL EVALUATION  PLAN OF TREATMENT FOR OUTPATIENT REHABILITATION  (COMPLETE FOR INITIAL CLAIMS ONLY)  Patient's Last Name, First Name, M.I.  YOB: 1945  Steven Duong     Provider's Name   [facility]   Medical Record No.  [id number]     Start of Care Date:  10/20/22   Onset Date:  09/28/22   Type:     _X__PT   ____OT  ____SLP Medical Diagnosis:  Dizziness (R42)     PT Diagnosis:  dizziness with s/s UVL, hyopfunction on L with h/o meniere's disease Visits from SOC:  1                              __________________________________________________________________________________  Plan of Treatment/Functional Goals:  neuromuscular re-education  vestibular rehab        GOALS  DVA  Chivo will have improved gaze stability based on DVA testing with no greater than 3 line loss from static at 2 hz head movement to reduced sensaiton of dizziness as he moves about his home and community.  11/10/22    HEP  Chivo will be independent in [facility] for long term management of his condition, for successful adaptation following meniere's attacks.  11/10/22         Therapy Frequency:  1 time/week   Predicted Duration of Therapy Intervention:  3 weeks    Kavya Hughes, PT                                    I CERTIFY THE NEED FOR THESE SERVICES FURNISHED UNDER        THIS PLAN OF TREATMENT AND WHILE UNDER MY CARE     (Physician co-signature of this document indicates review and certification of the therapy plan).                Certification Date From:  10/20/22   Certification Date To:  11/09/22    Referring Provider:  Nash Walker MD    Initial Assessment  See Epic Evaluation- Start of Care Date: 10/20/22

## 2022-10-27 ENCOUNTER — HOSPITAL ENCOUNTER (OUTPATIENT)
Dept: PHYSICAL THERAPY | Facility: CLINIC | Age: 77
Setting detail: THERAPIES SERIES
Discharge: HOME | End: 2022-10-27
Attending: FAMILY MEDICINE
Payer: MEDICARE

## 2022-10-27 PROCEDURE — 97112 NEUROMUSCULAR REEDUCATION: CPT | Mod: GP | Performed by: PHYSICAL THERAPIST

## 2022-10-31 NOTE — PROGRESS NOTES
[facility] Rehabilitation Service    Outpatient Physical Therapy Discharge Note  Patient: Steven Duong  : 1945    Beginning/End Dates of Reporting Period:  10/20/22 to 10/27/22. Patient was seen for 2 visits in this EOC for vestibular rehab. He canceled his final visit stating he was feeling well and no longer needed rehab.    Referring Provider: Nash Walker MD    Therapy Diagnosis: dizziness with s/s UVL, hyopfunction on L with h/o meniere's disease     Client Self Report:  Chivo denies dizziness, is feeling back to baseline.     Goals:  Goal Identifier DVA   Goal Description Chivo will have improved gaze stability based on DVA testing with no greater than 3 line loss from static at 2 hz head movement to reduced sensaiton of dizziness as he moves about his home and community.   Target Date 11/10/22   Date Met      Progress (detail required for progress note):  Chivo has not been having any dizziness. DVA was to be retested at his final visit but he canceled this session.      Goal Identifier HEP   Goal Description Chivo will be independent in HEP for long term management of his condition, for successful adaptation following meniere's attacks.   Target Date 11/10/22   Date Met   10/27/22   Progress (detail required for progress note):  Goal met        Plan:  Discharge from therapy.    Discharge:    Reason for Discharge: Patient chooses to discontinue therapy due to resolution of symptoms and indep HEP.    Discharge Plan: Patient to continue home program.

## 2022-12-13 ENCOUNTER — OFFICE VISIT (OUTPATIENT)
Dept: ORTHOPEDICS | Facility: CLINIC | Age: 77
End: 2022-12-13
Payer: MEDICARE

## 2022-12-13 ENCOUNTER — ANCILLARY PROCEDURE (OUTPATIENT)
Dept: GENERAL RADIOLOGY | Facility: CLINIC | Age: 77
End: 2022-12-13
Attending: PHYSICAL MEDICINE & REHABILITATION
Payer: MEDICARE

## 2022-12-13 ENCOUNTER — MYC MEDICAL ADVICE (OUTPATIENT)
Dept: ORTHOPEDICS | Facility: CLINIC | Age: 77
End: 2022-12-13

## 2022-12-13 VITALS
OXYGEN SATURATION: 99 % | SYSTOLIC BLOOD PRESSURE: 127 MMHG | HEIGHT: 68 IN | HEART RATE: 80 BPM | WEIGHT: 170 LBS | BODY MASS INDEX: 25.76 KG/M2 | DIASTOLIC BLOOD PRESSURE: 78 MMHG

## 2022-12-13 DIAGNOSIS — M50.30 DDD (DEGENERATIVE DISC DISEASE), CERVICAL: ICD-10-CM

## 2022-12-13 DIAGNOSIS — M47.812 FACET ARTHROPATHY, CERVICAL: Primary | ICD-10-CM

## 2022-12-13 DIAGNOSIS — M47.812 FACET ARTHROPATHY, CERVICAL: ICD-10-CM

## 2022-12-13 PROCEDURE — 72050 X-RAY EXAM NECK SPINE 4/5VWS: CPT | Mod: TC | Performed by: RADIOLOGY

## 2022-12-13 PROCEDURE — 99204 OFFICE O/P NEW MOD 45 MIN: CPT | Performed by: PHYSICAL MEDICINE & REHABILITATION

## 2022-12-13 ASSESSMENT — PAIN SCALES - GENERAL: PAINLEVEL: MILD PAIN (2)

## 2022-12-13 NOTE — PROGRESS NOTES
"PHYSICAL MEDICINE & REHABILITATION / MEDICAL SPINE        Date:  Dec 13, 2022    Name:  Steven Duong  YOB: 1945  MRN:  [id number]          CHIEF COMPLAINT:  Neck pain.        HISTORY OF PRESENT ILLNESS:  Mr. Steven Duong is a 77-year-old, right greater than left hand dominant, male.    In September 2022, Mr. Steven Duong developed right neck pain that extended to his right upper trapezius.  There was no injury or inciting event.  Mr. Duong reports that his chiropractor thought that maybe chin-ups might not be the best, so Mr. Duong has stopped doing chin ups.  Pain occurred in the right neck extending to the right upper trapezius in September and October 2022 and then resolved.  There is currently no pain in the right neck or upper back.  When there was pain, Mr. Steven Duong reported the pain was \"dull, sharp.\"  At that time pain was brought on and made worse with rotation to the right.  Now, his right neck and upper trapezius just feel a little bit tight.  Symptoms have improved.  There is no radiation of the right neck/upper back pain.    Mr. Steven Duong currently rates his pain as 0/10.  Pain varies from 0/10 to 8-9/10.  Chiropractic treatments and massage provided benefit.  Mr. Duong has not tried acupuncture, injections, or physical therapy.  He very rarely used any pain medications.    Mr. Steven Duong notes some dizziness due to Ménière's disease.  He does note some balance difficulties associated with his dizziness.  Mr. Steven Duong denies any focal weakness.  He notes that as he gets older he is getting slightly weaker.  Mr. Duong denies dropping objects.  He denies any difficulty with overhead activities.  He notes nonpainful crepitus in his neck.  Mr. Duong denies any catching or locking.  Mr. Steven Duong denies any bruising, redness, swelling.  He denies any tripping, stumbling, falling.  Mr. Duong does note constant numbness and tingling in his toes. "  He notes the symptoms intermittently in his hands.  He notes that his hands feel cold.  Mr. Duong denies any saddle anesthesia or bowel incontinence.  He did have prostate surgery.  However, Mr. Duong denies any bladder incontinence.    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  He denies any other injuries of his head, neck, upper back, shoulders, arms, elbows, forearms, wrists, hands, fingers.    Mr. Steven Duong denies any personal or family history of autoimmune diseases, rheumatologic diseases, gout, pseudogout.  He states that a maternal great aunt had Alzheimer's disease.  Mr. Duong denies any other personal or family history of neurologic disease.  Mr. Duong denies any personal or family history of inflammatory bowel disease.        REVIEW OF SYSTEMS:  Review of Systems     Constitutional:  Negative for fever, weight loss, weight gain and fatigue.   HENT:  Negative for tinnitus, trouble swallowing and hoarse voice.    Eyes:  Negative for pain, eye pain and decreased vision.   Respiratory:   Negative for cough, shortness of breath and wheezing.    Cardiovascular:  Negative for chest pain, palpitations and leg swelling.   Gastrointestinal:  Negative for heartburn, nausea, vomiting, abdominal pain, diarrhea, constipation, blood in stool and bowel incontinence.   Genitourinary:  Negative for bladder incontinence, dysuria, hematuria and difficulty urinating.   Musculoskeletal:  Negative for myalgias and arthralgias.   Skin:  Negative for itching, poor wound healing and poor wound healing.   Neurological:  Positive for dizziness. Negative for seizures, loss of consciousness, headaches and difficulty walking.   Endo/Heme:  Negative for anemia, swollen glands and bruises/bleeds easily.   Psychiatric/Behavioral:  Negative for depression.          ALLERGIES:  Allergies   Allergen Reactions     Penicillins      Other reaction(s): Throat Swelling/Closing         MEDICATIONS:  Current  Outpatient Medications   Medication Sig Dispense Refill     amLODIPine (NORVASC) 5 MG tablet TAKE 1 TABLET(5 MG) BY MOUTH DAILY 90 tablet 3     atorvastatin (LIPITOR) 10 MG tablet TAKE 1 TABLET(10 MG) BY MOUTH DAILY 90 tablet 3     co-enzyme Q-10 100 MG CAPS capsule Take by mouth daily       fluticasone (FLONASE) 50 MCG/ACT nasal spray Spray 1-2 sprays into both nostrils daily 48 g 2     hydrochlorothiazide (HYDRODIURIL) 25 MG tablet TAKE 1 TABLET(25 MG) BY MOUTH DAILY 90 tablet 3     losartan (COZAAR) 50 MG tablet TAKE 1 TABLET(50 MG) BY MOUTH DAILY 90 tablet 3     LUTEIN PO        Multiple Vitamin (MULTI-VITAMINS) TABS        potassium chloride ER (KLOR-CON) 20 MEQ CR tablet Take 1 tablet (20 mEq) by mouth daily 90 tablet 3         PAST MEDICAL HISTORY:  Past Medical History:   Diagnosis Date     Hyperlipidemia LDL goal < 130      Hypertension goal BP (blood pressure) < 140/90      Meniere's disease, unspecified      Pericarditis      Personal history of prostate cancer          PAST SURGICAL HISTORY:  Past Surgical History:   Procedure Laterality Date     COLONOSCOPY      no polyps     SUPRAPUBIC PROSTATECTOMY       TONSILLECTOMY           FAMILY HISTORY:  Family History   Problem Relation Age of Onset     Cerebrovascular Disease Mother         with associated parkinsons     Hypertension Mother      Hyperlipidemia Mother      Coronary Artery Disease Father 64        bypass x 2 (initial at 65 yo)     Prostate Cancer Father 70     Hypertension Father      Hyperlipidemia Father      No Known Problems Sister      No Known Problems Sister      No Known Problems Sister      Prostate Cancer Brother      Prostate Cancer Brother      Coronary Artery Disease Paternal Grandfather      Breast Cancer Daughter          2021-Liver etc.     Liver Cancer Daughter         metastatic to bone     Cancer Daughter         Bone - stage 4     Melanoma Daughter      Brain Cancer Son 13     Prostate Cancer  "Cousin      Prostate Cancer Other      Colon Cancer No family hx of          SOCIAL HISTORY:  Social History     Socioeconomic History     Marital status:      Spouse name: Lalitha     Number of children: 2     Years of education: Not on file     Highest education level: Not on file   Occupational History     Occupation: sales     Employer: [employer]   Tobacco Use     Smoking status: Never     Smokeless tobacco: Never   Vaping Use     Vaping Use: Never used   Substance and Sexual Activity     Alcohol use: Yes     Alcohol/week: 11.7 standard drinks     Comment: 1-2 beers nightly     Drug use: No     Sexual activity: Not Currently     Partners: Female     Birth control/protection: Surgical   Other Topics Concern      Service Not Asked     Blood Transfusions Not Asked     Caffeine Concern No     Comment: 1 serv/day     Occupational Exposure Not Asked     Hobby Hazards Not Asked     Sleep Concern Yes     Comment: occ with travel for work     Stress Concern Not Asked     Weight Concern Not Asked     Special Diet Not Asked     Back Care Not Asked     Exercise Yes     Comment: regular     Bike Helmet Not Asked     Seat Belt Yes     Self-Exams Not Asked     Parent/sibling w/ CABG, MI or angioplasty before 65F 55M? No   Social History Narrative     Not on file     Social Determinants of Health     Financial Resource Strain: Not on file   Food Insecurity: Not on file   Transportation Needs: Not on file   Physical Activity: Not on file   Stress: Not on file   Social Connections: Not on file   Intimate Partner Violence: Not on file   Housing Stability: Not on file         PHYSICAL EXAMINATION:  Vitals:    12/13/22 0924   BP: 127/78   Pulse: 80   SpO2: 99%   Weight: 77.1 kg (170 lb)   Height: 1.727 m (5' 8\")       GENERAL:  No acute distress.  Pleasant and cooperative.   PSYCH:  Normal mood and affect.  HEAD:  Normocephalic.  SPEECH:  No dysarthria.  EYES:  No scleral icterus.  Wearing glasses.  EARS:  Wearing " a right hearing aid.  Hearing is intact to spoken voice.  NOSE/MOUTH:  Wearing a face mask.  LUNGS:  No respiratory distress.  No increased work of breathing.  VASCULAR/PULSES:  Radial:  Right 2+.  Left 2+.  UPPER EXTREMITIES:  No clubbing, cyanosis, or edema bilaterally.    BALANCE AND GAIT:   The patient has a reciprocal gait pattern without antalgia.  He is able to heel walk and toe walk without difficulty.  He has mild difficulty tandem walking.    INSPECTION:  There is no erythema, ecchymosis, deformity, asymmetry, or abnormality of the neck.    CERVICAL PALPATION:  Inion:  not tender.  Greater Occipital Nerve:  Right not tender.  Left not tender.  Lesser Occipital Nerve:  Right not tender.  Left not tender.  Cervical Spinous Processes:  not tender.  Cervical Paraspinals:  Right not tender.  Left not tender.  Splenius Capitis:  Right not tender.  Left not tender.  Levator Scapulae at the Neck:  Right not tender.  Left not tender.  Cervical Facet Joints:  Right not tender.  Left not tender.  Longissimus:  Right not tender.  Left not tender.  Anterior, Middle, Posterior Scalenes:  Right not tender.  Left not tender.  Sternocleidomastoid:  Right not tender.  Left not tender.  Trapezius:  Right not tender.  Left not tender.    THORACIC PALPATION:  Thoracic Spinous Processes:  not tender.  Thoracic Paraspinals:  Right not tender.  Left not tender.  Levator Scapulae at the Scapular Insertion:  Right not tender.  Left not tender.  Rhomboid Minor:  Right not tender.  Left not tender.  Rhomboid Major:  Right not tender.  Left not tender.    CERVICAL RANGE OF MOTION:  Forward Flexion (40 ):  Moderately reduced range of motion, does not cause pain, does not cause radiating pain.  Extension (40 ):  Moderately reduced range of motion, does not cause pain, does not cause radiating pain.  Rotating Right (45 ):  Moderately reduced range of motion, does not cause pain, does not cause radiating pain.  Rotating Left (45 ):   Moderately reduced range of motion, does not cause pain, does not cause radiating pain.  Lateral Bending Right (40 ):  Moderately reduced range of motion, does not cause pain, does not cause radiating pain.  Lateral Bending Left (40 ):  Moderately reduced range of motion, does not cause pain, does not cause radiating pain.    SHOULDER RANGE OF MOTION:  Active Forward Flexion (180 ):  Right 180 .  Left 180 .  Active Extension (60 ):  Right 50 .  Left 50 .  Active Abduction (180 ):  Right 180 .  Left 180 .  Scapular Dyskinesis:  Right mild.  Left slight.    STRENGTH:  Shoulder abduction:  Right 5/5.  Left 5/5.  Elbow flexion:  Right 5/5.  Left 5/5.  Wrist extension:  Right 5/5.  Left 5/5.  Elbow extension:  Right 5/5.  Left 5/5.  Wrist flexion:  Right 5/5.  Left 5/5.  Finger flexion:  Right 5/5.  Left 5/5.  Finger abduction:  Right 5/5.  Left 5/5.    SENSATION:  Intact to light touch along right C3, C4, C5, C6, C7, C8, T1, T2.  Intact to light touch along left C3, C4, C5, C6, C7, C8, T1, T2.    REFLEXES:  Biceps (C5-C6):  Right 1+.  Left 1+.  Brachioradialis (C5-C6):  Right 1+.  Left 1+.  Triceps (C7-C8):  Right 2+.  Left 2+.  Middleton's:  Right -.  Left -.    CERVICAL SPECIAL TESTS:  Facet Loading:  Right -.  Left -.  Spurling Neck Compression:  Right -.  Left -.    SHOULDER SPECIAL TESTS:  Drop Arm:  Right - . Left -.        IMAGING:  Strays of the cervical spine were ordered today and completed after today's appointment.  I reviewed x-rays of the cervical spine from 12/13/2022.  There is grade 1 retrolisthesis of C3 on C4.  There is disc height loss most pronounced at C5-C6 and C6-C7.  There is mild facet arthropathy throughout the cervical spine.          ASSESSMENT/PLAN:  Mr. Steven Duong is a 77-year-old, right greater than left hand dominant, male.  History and exam are most consistent with cervical facet arthropathy.  Do do suspect that Mr. Duong will have cervical degenerative disc disease.  Discussed  diagnoses, pathophysiologies, and treatment options with Mr. Steven Duong.  Discussed the options doing nothing/living with it, physical therapy, chiropractic care, oral medications such as gabapentin and pregabalin, cervical facet joint medial branch blocks with following radiofrequency ablations.  Mr. Steven Duong will resume his personal exercise regimen at this time.  He will continue to work with his chiropractor.  Mr. Duong will follow-up in this clinic as needed for his neck pain.    Mr. Steven Duong also has some intermittent low back pain.  He will be scheduled for an appointment to be seen for that at his convenience.        Travis Amaya MD

## 2022-12-13 NOTE — TELEPHONE ENCOUNTER
I called and spoke with Mr. Steven GALARZA [place].  We discussed his cervical spine x-ray results.  May consider flexion-extension x-rays of the cervical spine.    Travis Amaya MD        [facility]      From: Chivo Duong      Created: 12/13/2022 4:05 PM        *-*-*This message has not been handled.*-*-*    By looking at the x-rays myself could I see with the untrained eye the issues brought up by the x-ray reader?  Thanks  Chivo Duong

## 2022-12-13 NOTE — LETTER
"    12/13/2022         RE: Steven Duong  [address] [facility] [address]        Dear Colleague,    Thank you for referring your patient, Steven Duong, to the [facility]. Please see a copy of my visit note below.    PHYSICAL MEDICINE & REHABILITATION / MEDICAL SPINE        Date:  Dec 13, 2022    Name:  Steven Duong  YOB: 1945  MRN:  [id number]          CHIEF COMPLAINT:  Neck pain.        HISTORY OF PRESENT ILLNESS:  Mr. Steven Duong is a 77-year-old, right greater than left hand dominant, male.    In September 2022, Mr. Steven Duong developed right neck pain that extended to his right upper trapezius.  There was no injury or inciting event.  Mr. Duong reports that his chiropractor thought that maybe chin-ups might not be the best, so Mr. Duong has stopped doing chin ups.  Pain occurred in the right neck extending to the right upper trapezius in September and October 2022 and then resolved.  There is currently no pain in the right neck or upper back.  When there was pain, Mr. Steven Duong reported the pain was \"dull, sharp.\"  At that time pain was brought on and made worse with rotation to the right.  Now, his right neck and upper trapezius just feel a little bit tight.  Symptoms have improved.  There is no radiation of the right neck/upper back pain.    Mr. Steven Duong currently rates his pain as 0/10.  Pain varies from 0/10 to 8-9/10.  Chiropractic treatments and massage provided benefit.  Mr. Duong has not tried acupuncture, injections, or physical therapy.  He very rarely used any pain medications.    Mr. Steven Duong notes some dizziness due to Ménière's disease.  He does note some balance difficulties associated with his dizziness.  Mr. Steven Duong denies any focal weakness.  He notes that as he gets older he is getting slightly weaker.  Mr. Duong denies dropping objects.  He denies any difficulty with overhead " activities.  He notes nonpainful crepitus in his neck.  Mr. Duong denies any catching or locking.  Mr. Steven Duong denies any bruising, redness, swelling.  He denies any tripping, stumbling, falling.  Mr. Duong does note constant numbness and tingling in his toes.  He notes the symptoms intermittently in his hands.  He notes that his hands feel cold.  Mr. Duong denies any saddle anesthesia or bowel incontinence.  He did have prostate surgery.  However, Mr. Duong denies any bladder incontinence.    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  He denies any other injuries of his head, neck, upper back, shoulders, arms, elbows, forearms, wrists, hands, fingers.    Mr. Steven Duong denies any personal or family history of autoimmune diseases, rheumatologic diseases, gout, pseudogout.  He states that a maternal great aunt had Alzheimer's disease.  Mr. Duong denies any other personal or family history of neurologic disease.  Mr. Duong denies any personal or family history of inflammatory bowel disease.        REVIEW OF SYSTEMS:  Review of Systems     Constitutional:  Negative for fever, weight loss, weight gain and fatigue.   HENT:  Negative for tinnitus, trouble swallowing and hoarse voice.    Eyes:  Negative for pain, eye pain and decreased vision.   Respiratory:   Negative for cough, shortness of breath and wheezing.    Cardiovascular:  Negative for chest pain, palpitations and leg swelling.   Gastrointestinal:  Negative for heartburn, nausea, vomiting, abdominal pain, diarrhea, constipation, blood in stool and bowel incontinence.   Genitourinary:  Negative for bladder incontinence, dysuria, hematuria and difficulty urinating.   Musculoskeletal:  Negative for myalgias and arthralgias.   Skin:  Negative for itching, poor wound healing and poor wound healing.   Neurological:  Positive for dizziness. Negative for seizures, loss of consciousness, headaches and difficulty walking.    Endo/Heme:  Negative for anemia, swollen glands and bruises/bleeds easily.   Psychiatric/Behavioral:  Negative for depression.          ALLERGIES:  Allergies   Allergen Reactions     Penicillins      Other reaction(s): Throat Swelling/Closing         MEDICATIONS:  Current Outpatient Medications   Medication Sig Dispense Refill     amLODIPine (NORVASC) 5 MG tablet TAKE 1 TABLET(5 MG) BY MOUTH DAILY 90 tablet 3     atorvastatin (LIPITOR) 10 MG tablet TAKE 1 TABLET(10 MG) BY MOUTH DAILY 90 tablet 3     co-enzyme Q-10 100 MG CAPS capsule Take by mouth daily       fluticasone (FLONASE) 50 MCG/ACT nasal spray Spray 1-2 sprays into both nostrils daily 48 g 2     hydrochlorothiazide (HYDRODIURIL) 25 MG tablet TAKE 1 TABLET(25 MG) BY MOUTH DAILY 90 tablet 3     losartan (COZAAR) 50 MG tablet TAKE 1 TABLET(50 MG) BY MOUTH DAILY 90 tablet 3     LUTEIN PO        Multiple Vitamin (MULTI-VITAMINS) TABS        potassium chloride ER (KLOR-CON) 20 MEQ CR tablet Take 1 tablet (20 mEq) by mouth daily 90 tablet 3         PAST MEDICAL HISTORY:  Past Medical History:   Diagnosis Date     Hyperlipidemia LDL goal < 130      Hypertension goal BP (blood pressure) < 140/90      Meniere's disease, unspecified      Pericarditis 2007     Personal history of prostate cancer 2001         PAST SURGICAL HISTORY:  Past Surgical History:   Procedure Laterality Date     COLONOSCOPY  2020    no polyps     SUPRAPUBIC PROSTATECTOMY  2001     TONSILLECTOMY  1953         FAMILY HISTORY:  Family History   Problem Relation Age of Onset     Cerebrovascular Disease Mother         with associated parkinsons     Hypertension Mother      Hyperlipidemia Mother      Coronary Artery Disease Father 64        bypass x 2 (initial at 63 yo)     Prostate Cancer Father 70     Hypertension Father      Hyperlipidemia Father      No Known Problems Sister      No Known Problems Sister      No Known Problems Sister      Prostate Cancer Brother      Prostate Cancer Brother       Coronary Artery Disease Paternal Grandfather      Breast Cancer Daughter          2021-Liver etc.     Liver Cancer Daughter         metastatic to bone     Cancer Daughter         Bone - stage 4     Melanoma Daughter      Brain Cancer Son 13     Prostate Cancer Cousin      Prostate Cancer Other      Colon Cancer No family hx of          SOCIAL HISTORY:  Social History     Socioeconomic History     Marital status:      Spouse name: Lalitha     Number of children: 2     Years of education: Not on file     Highest education level: Not on file   Occupational History     Occupation: sales     Employer: [employer]   Tobacco Use     Smoking status: Never     Smokeless tobacco: Never   Vaping Use     Vaping Use: Never used   Substance and Sexual Activity     Alcohol use: Yes     Alcohol/week: 11.7 standard drinks     Comment: 1-2 beers nightly     Drug use: No     Sexual activity: Not Currently     Partners: Female     Birth control/protection: Surgical   Other Topics Concern      Service Not Asked     Blood Transfusions Not Asked     Caffeine Concern No     Comment: 1 serv/day     Occupational Exposure Not Asked     Hobby Hazards Not Asked     Sleep Concern Yes     Comment: occ with travel for work     Stress Concern Not Asked     Weight Concern Not Asked     Special Diet Not Asked     Back Care Not Asked     Exercise Yes     Comment: regular     Bike Helmet Not Asked     Seat Belt Yes     Self-Exams Not Asked     Parent/sibling w/ CABG, MI or angioplasty before 65F 55M? No   Social History Narrative     Not on file     Social Determinants of Health     Financial Resource Strain: Not on file   Food Insecurity: Not on file   Transportation Needs: Not on file   Physical Activity: Not on file   Stress: Not on file   Social Connections: Not on file   Intimate Partner Violence: Not on file   Housing Stability: Not on file         PHYSICAL EXAMINATION:  Vitals:    22 0924   BP: 127/78   Pulse:  "80   SpO2: 99%   Weight: 77.1 kg (170 lb)   Height: 1.727 m (5' 8\")       GENERAL:  No acute distress.  Pleasant and cooperative.   PSYCH:  Normal mood and affect.  HEAD:  Normocephalic.  SPEECH:  No dysarthria.  EYES:  No scleral icterus.  Wearing glasses.  EARS:  Wearing a right hearing aid.  Hearing is intact to spoken voice.  NOSE/MOUTH:  Wearing a face mask.  LUNGS:  No respiratory distress.  No increased work of breathing.  VASCULAR/PULSES:  Radial:  Right 2+.  Left 2+.  UPPER EXTREMITIES:  No clubbing, cyanosis, or edema bilaterally.    BALANCE AND GAIT:   The patient has a reciprocal gait pattern without antalgia.  He is able to heel walk and toe walk without difficulty.  He has mild difficulty tandem walking.    INSPECTION:  There is no erythema, ecchymosis, deformity, asymmetry, or abnormality of the neck.    CERVICAL PALPATION:  Inion:  not tender.  Greater Occipital Nerve:  Right not tender.  Left not tender.  Lesser Occipital Nerve:  Right not tender.  Left not tender.  Cervical Spinous Processes:  not tender.  Cervical Paraspinals:  Right not tender.  Left not tender.  Splenius Capitis:  Right not tender.  Left not tender.  Levator Scapulae at the Neck:  Right not tender.  Left not tender.  Cervical Facet Joints:  Right not tender.  Left not tender.  Longissimus:  Right not tender.  Left not tender.  Anterior, Middle, Posterior Scalenes:  Right not tender.  Left not tender.  Sternocleidomastoid:  Right not tender.  Left not tender.  Trapezius:  Right not tender.  Left not tender.    THORACIC PALPATION:  Thoracic Spinous Processes:  not tender.  Thoracic Paraspinals:  Right not tender.  Left not tender.  Levator Scapulae at the Scapular Insertion:  Right not tender.  Left not tender.  Rhomboid Minor:  Right not tender.  Left not tender.  Rhomboid Major:  Right not tender.  Left not tender.    CERVICAL RANGE OF MOTION:  Forward Flexion (40 ):  Moderately reduced range of motion, does not cause pain, does " not cause radiating pain.  Extension (40 ):  Moderately reduced range of motion, does not cause pain, does not cause radiating pain.  Rotating Right (45 ):  Moderately reduced range of motion, does not cause pain, does not cause radiating pain.  Rotating Left (45 ):  Moderately reduced range of motion, does not cause pain, does not cause radiating pain.  Lateral Bending Right (40 ):  Moderately reduced range of motion, does not cause pain, does not cause radiating pain.  Lateral Bending Left (40 ):  Moderately reduced range of motion, does not cause pain, does not cause radiating pain.    SHOULDER RANGE OF MOTION:  Active Forward Flexion (180 ):  Right 180 .  Left 180 .  Active Extension (60 ):  Right 50 .  Left 50 .  Active Abduction (180 ):  Right 180 .  Left 180 .  Scapular Dyskinesis:  Right mild.  Left slight.    STRENGTH:  Shoulder abduction:  Right 5/5.  Left 5/5.  Elbow flexion:  Right 5/5.  Left 5/5.  Wrist extension:  Right 5/5.  Left 5/5.  Elbow extension:  Right 5/5.  Left 5/5.  Wrist flexion:  Right 5/5.  Left 5/5.  Finger flexion:  Right 5/5.  Left 5/5.  Finger abduction:  Right 5/5.  Left 5/5.    SENSATION:  Intact to light touch along right C3, C4, C5, C6, C7, C8, T1, T2.  Intact to light touch along left C3, C4, C5, C6, C7, C8, T1, T2.    REFLEXES:  Biceps (C5-C6):  Right 1+.  Left 1+.  Brachioradialis (C5-C6):  Right 1+.  Left 1+.  Triceps (C7-C8):  Right 2+.  Left 2+.  Middleton's:  Right -.  Left -.    CERVICAL SPECIAL TESTS:  Facet Loading:  Right -.  Left -.  Spurling Neck Compression:  Right -.  Left -.    SHOULDER SPECIAL TESTS:  Drop Arm:  Right - . Left -.        IMAGING:  Strays of the cervical spine were ordered today and completed after today's appointment.  I reviewed x-rays of the cervical spine from 12/13/2022.  There is grade 1 retrolisthesis of C3 on C4.  There is disc height loss most pronounced at C5-C6 and C6-C7.  There is mild facet arthropathy throughout the cervical  spine.          ASSESSMENT/PLAN:  Mr. Steven Duong is a 77-year-old, right greater than left hand dominant, male.  History and exam are most consistent with cervical facet arthropathy.  Do do suspect that Mr. Duong will have cervical degenerative disc disease.  Discussed diagnoses, pathophysiologies, and treatment options with Mr. Steven Duong.  Discussed the options doing nothing/living with it, physical therapy, chiropractic care, oral medications such as gabapentin and pregabalin, cervical facet joint medial branch blocks with following radiofrequency ablations.  Mr. Steven Duong will resume his personal exercise regimen at this time.  He will continue to work with his chiropractor.  Mr. Duong will follow-up in this clinic as needed for his neck pain.    Mr. Steven Duong also has some intermittent low back pain.  He will be scheduled for an appointment to be seen for that at his convenience.        Travis Amaya MD

## 2022-12-13 NOTE — PATIENT INSTRUCTIONS
Please schedule an appointment for low back pain at your convenience.  You can schedule this at the , or you can call [phone].

## 2022-12-14 ASSESSMENT — ENCOUNTER SYMPTOMS
WEIGHT LOSS: 0
NERVOUS/ANXIOUS: 0
DIZZINESS: 1
BOWEL INCONTINENCE: 0
BLOOD IN STOOL: 0
HEMATURIA: 0
DEPRESSION: 0
HEARTBURN: 0
HEADACHES: 0
LOSS OF CONSCIOUSNESS: 0
ARTHRALGIAS: 0
LEG SWELLING: 0
VOMITING: 0
SHORTNESS OF BREATH: 0
HOARSE VOICE: 0
TROUBLE SWALLOWING: 0
EYE PAIN: 0
DIFFICULTY URINATING: 0
MYALGIAS: 0
FATIGUE: 0
POOR WOUND HEALING: 0
COUGH: 0
WHEEZING: 0
CONSTIPATION: 0
INSOMNIA: 0
SEIZURES: 0
WEIGHT GAIN: 0
DYSURIA: 0
BRUISES/BLEEDS EASILY: 0
ABDOMINAL PAIN: 0
FEVER: 0
DIARRHEA: 0
SWOLLEN GLANDS: 0
NAUSEA: 0
PALPITATIONS: 0

## 2022-12-22 ENCOUNTER — OFFICE VISIT (OUTPATIENT)
Dept: URGENT CARE | Facility: URGENT CARE | Age: 77
End: 2022-12-22
Payer: MEDICARE

## 2022-12-22 VITALS
TEMPERATURE: 97.8 F | DIASTOLIC BLOOD PRESSURE: 70 MMHG | SYSTOLIC BLOOD PRESSURE: 140 MMHG | HEART RATE: 78 BPM | RESPIRATION RATE: 18 BRPM | OXYGEN SATURATION: 98 %

## 2022-12-22 DIAGNOSIS — R07.0 THROAT PAIN: Primary | ICD-10-CM

## 2022-12-22 DIAGNOSIS — J01.90 ACUTE SINUSITIS WITH SYMPTOMS > 10 DAYS: ICD-10-CM

## 2022-12-22 LAB
DEPRECATED S PYO AG THROAT QL EIA: NEGATIVE
GROUP A STREP BY PCR: NOT DETECTED

## 2022-12-22 PROCEDURE — 87651 STREP A DNA AMP PROBE: CPT | Performed by: NURSE PRACTITIONER

## 2022-12-22 PROCEDURE — 99213 OFFICE O/P EST LOW 20 MIN: CPT | Performed by: NURSE PRACTITIONER

## 2022-12-22 RX ORDER — AZITHROMYCIN 250 MG/1
TABLET, FILM COATED ORAL
Qty: 6 TABLET | Refills: 0 | Status: SHIPPED | OUTPATIENT
Start: 2022-12-22 | End: 2022-12-27

## 2022-12-22 NOTE — PROGRESS NOTES
Assessment & Plan     Throat pain  - Streptococcus A Rapid Screen w/Reflex to PCR - Clinic Collect  - Group A Streptococcus PCR Throat Swab    Acute sinusitis with symptoms > 10 days  - azithromycin (ZITHROMAX) 250 MG tablet  Dispense: 6 tablet; Refill: 0         Return in about 1 week (around 12/29/2022) for with regular provider if symptoms persist.    IGNACIO Wells CNP  [facility] ALECIA Waldron is a 77 year old male who presents to clinic today for the following health issues:  Chief Complaint   Patient presents with     Pharyngitis     Pt here today with CC of ST, sinus pressure and drainage, difficulty sleeping.   Possible strep exposure.      HPI    URI Adult    Onset of symptoms was 8 day(s) ago.  Course of illness is worsening.    Severity moderate  Current and Associated symptoms: runny nose, stuffy nose, sore throat, facial pain/pressure, headache and nausea  Treatment measures tried include Tylenol/Ibuprofen and OTC Cough med.  Predisposing factors include ill contact: Family member .    Going to [place] in 2 days for 3 weeks.        Review of Systems  Constitutional, HEENT, cardiovascular, pulmonary, GI, , musculoskeletal, neuro, skin, endocrine and psych systems are negative, except as otherwise noted.      Objective    BP (!) 140/70 (BP Location: Right arm, Patient Position: Sitting, Cuff Size: Adult Large)   Pulse 78   Temp 97.8  F (36.6  C) (Tympanic)   Resp 18   SpO2 98%   Physical Exam   GENERAL: healthy, alert and no distress  EYES: Eyes grossly normal to inspection, PERRL and conjunctivae and sclerae normal  HENT: Bilateral frontal and maxillary sinus tenderness ear canals and TM's normal, nose and mouth without ulcers or lesions  NECK: no adenopathy, no asymmetry, masses, or scars and thyroid normal to palpation  RESP: lungs clear to auscultation - no rales, rhonchi or wheezes  CV: regular rate and rhythm, normal S1 S2, no S3 or S4, no murmur,  click or rub, no peripheral edema and peripheral pulses strong  MS: no gross musculoskeletal defects noted, no edema  SKIN: no suspicious lesions or rashes

## 2022-12-22 NOTE — PATIENT INSTRUCTIONS
Results for orders placed or performed in visit on 12/22/22   Streptococcus A Rapid Screen w/Reflex to PCR - Clinic Collect     Status: Normal    Specimen: Throat; Swab   Result Value Ref Range    Group A Strep antigen Negative Negative       RST negative  Contigency script for antibiotics given if not improving.  TC swab pending.    Push fluids  Lots of handwashing.   Ibuprofen as needed for fever or pain  Delsym or dayquil/nyquil for cough as needed     Rest as able.   Will call if any other labs positive.    F/u in the clinic if symptoms persist or worsen.

## 2023-01-07 ENCOUNTER — HEALTH MAINTENANCE LETTER (OUTPATIENT)
Age: 78
End: 2023-01-07

## 2023-02-14 NOTE — PATIENT INSTRUCTIONS
Treating Blepharitis: Self-Care  To treat the problem, keep your eyelids clean. Warm compresses can reduce redness and swelling, and help clean your eyelids, too. You may also need to wash the area gently with an eyelid scrub when you wake up.    To apply a warm compress:  1. Wash your hands with soap and warm water.  2. Wet a clean washcloth with warm water. Then wring it out.  3. Close your eyes and place the washcloth over your eyelids for 3 to 5 minutes. This helps loosen scales or crusts.  4. Wet the washcloth again as often as needed to keep it warm.  Repeat 2 or more times a day. Use a clean washcloth each time.    To use an eyelid scrub:  1. Wash your hands with soap and warm water.  2. Use a ready-made eyelid scrub. Or mix 3 drops of baby shampoo in 1/4 cup of warm water.  3. Dip a lint-free pad, cotton swab, or clean washcloth in the scrub.  4. Close one eye and gently scrub the base of the eyelid.  5. Rinse the lid in cool water and dry with a clean towel.  6. Repeat on your other eye.    [id number] [employer]. [address] [place] [address], [place] [address]. All rights reserved. This information is not intended as a substitute for professional medical care. Always follow your healthcare professional's instructions.         Hide Additional Notes?: No Detail Level: Detailed

## 2023-02-15 ASSESSMENT — ENCOUNTER SYMPTOMS
NAUSEA: 0
HEARTBURN: 0
WEAKNESS: 0
HEMATOCHEZIA: 0
MYALGIAS: 0
HEADACHES: 0
DIARRHEA: 0
NERVOUS/ANXIOUS: 0
ABDOMINAL PAIN: 0
SHORTNESS OF BREATH: 0
ARTHRALGIAS: 0
DIZZINESS: 0
SORE THROAT: 0
FREQUENCY: 0
FEVER: 0
COUGH: 0
PALPITATIONS: 0
DYSURIA: 0
HEMATURIA: 0
PARESTHESIAS: 0
JOINT SWELLING: 0
CHILLS: 0
EYE PAIN: 0
CONSTIPATION: 0

## 2023-02-15 ASSESSMENT — ACTIVITIES OF DAILY LIVING (ADL): CURRENT_FUNCTION: NO ASSISTANCE NEEDED

## 2023-02-21 ENCOUNTER — OFFICE VISIT (OUTPATIENT)
Dept: FAMILY MEDICINE | Facility: CLINIC | Age: 78
End: 2023-02-21
Payer: MEDICARE

## 2023-02-21 VITALS
HEART RATE: 77 BPM | RESPIRATION RATE: 16 BRPM | TEMPERATURE: 97 F | OXYGEN SATURATION: 97 % | DIASTOLIC BLOOD PRESSURE: 80 MMHG | BODY MASS INDEX: 26.22 KG/M2 | SYSTOLIC BLOOD PRESSURE: 128 MMHG | WEIGHT: 173 LBS | HEIGHT: 68 IN

## 2023-02-21 DIAGNOSIS — C61 PROSTATE CANCER (H): ICD-10-CM

## 2023-02-21 DIAGNOSIS — R73.09 ELEVATED GLUCOSE: ICD-10-CM

## 2023-02-21 DIAGNOSIS — I10 HYPERTENSION GOAL BP (BLOOD PRESSURE) < 130/80: ICD-10-CM

## 2023-02-21 DIAGNOSIS — E78.5 HYPERLIPIDEMIA LDL GOAL <100: ICD-10-CM

## 2023-02-21 DIAGNOSIS — Z00.00 ENCOUNTER FOR MEDICARE ANNUAL WELLNESS EXAM: Primary | ICD-10-CM

## 2023-02-21 DIAGNOSIS — J30.2 SEASONAL ALLERGIC RHINITIS, UNSPECIFIED TRIGGER: ICD-10-CM

## 2023-02-21 DIAGNOSIS — I73.00 RAYNAUD'S PHENOMENON WITHOUT GANGRENE: ICD-10-CM

## 2023-02-21 LAB
ALBUMIN SERPL BCG-MCNC: 4.6 G/DL (ref 3.5–5.2)
ALP SERPL-CCNC: 61 U/L (ref 40–129)
ALT SERPL W P-5'-P-CCNC: 20 U/L (ref 10–50)
ANION GAP SERPL CALCULATED.3IONS-SCNC: 15 MMOL/L (ref 7–15)
AST SERPL W P-5'-P-CCNC: 27 U/L (ref 10–50)
BILIRUB SERPL-MCNC: 0.9 MG/DL
BUN SERPL-MCNC: 13.5 MG/DL (ref 8–23)
CALCIUM SERPL-MCNC: 10 MG/DL (ref 8.8–10.2)
CHLORIDE SERPL-SCNC: 97 MMOL/L (ref 98–107)
CHOLEST SERPL-MCNC: 162 MG/DL
CREAT SERPL-MCNC: 0.94 MG/DL (ref 0.67–1.17)
CREAT UR-MCNC: 93 MG/DL
DEPRECATED HCO3 PLAS-SCNC: 26 MMOL/L (ref 22–29)
GFR SERPL CREATININE-BSD FRML MDRD: 84 ML/MIN/1.73M2
GLUCOSE SERPL-MCNC: 129 MG/DL (ref 70–99)
HDLC SERPL-MCNC: 69 MG/DL
LDLC SERPL CALC-MCNC: 81 MG/DL
MICROALBUMIN UR-MCNC: 16.4 MG/L
MICROALBUMIN/CREAT UR: 17.63 MG/G CR (ref 0–17)
NONHDLC SERPL-MCNC: 93 MG/DL
POTASSIUM SERPL-SCNC: 3.8 MMOL/L (ref 3.4–5.3)
PROT SERPL-MCNC: 7.5 G/DL (ref 6.4–8.3)
PSA SERPL-MCNC: 0.03 NG/ML (ref 0–6.5)
SODIUM SERPL-SCNC: 138 MMOL/L (ref 136–145)
TRIGL SERPL-MCNC: 59 MG/DL

## 2023-02-21 PROCEDURE — 99214 OFFICE O/P EST MOD 30 MIN: CPT | Mod: 25 | Performed by: FAMILY MEDICINE

## 2023-02-21 PROCEDURE — 36415 COLL VENOUS BLD VENIPUNCTURE: CPT | Performed by: FAMILY MEDICINE

## 2023-02-21 PROCEDURE — 80053 COMPREHEN METABOLIC PANEL: CPT | Performed by: FAMILY MEDICINE

## 2023-02-21 PROCEDURE — 82570 ASSAY OF URINE CREATININE: CPT | Performed by: FAMILY MEDICINE

## 2023-02-21 PROCEDURE — 84153 ASSAY OF PSA TOTAL: CPT | Performed by: FAMILY MEDICINE

## 2023-02-21 PROCEDURE — 82043 UR ALBUMIN QUANTITATIVE: CPT | Performed by: FAMILY MEDICINE

## 2023-02-21 PROCEDURE — G0439 PPPS, SUBSEQ VISIT: HCPCS | Performed by: FAMILY MEDICINE

## 2023-02-21 PROCEDURE — 80061 LIPID PANEL: CPT | Performed by: FAMILY MEDICINE

## 2023-02-21 RX ORDER — ATORVASTATIN CALCIUM 10 MG/1
10 TABLET, FILM COATED ORAL DAILY
Qty: 90 TABLET | Refills: 3 | Status: SHIPPED | OUTPATIENT
Start: 2023-02-21 | End: 2024-02-22

## 2023-02-21 RX ORDER — POTASSIUM CHLORIDE 1500 MG/1
20 TABLET, EXTENDED RELEASE ORAL DAILY
Qty: 90 TABLET | Refills: 3 | Status: SHIPPED | OUTPATIENT
Start: 2023-02-21 | End: 2024-02-22

## 2023-02-21 RX ORDER — LOSARTAN POTASSIUM 50 MG/1
50 TABLET ORAL DAILY
Qty: 90 TABLET | Refills: 3 | Status: SHIPPED | OUTPATIENT
Start: 2023-02-21 | End: 2024-02-22

## 2023-02-21 RX ORDER — AMLODIPINE BESYLATE 5 MG/1
5 TABLET ORAL DAILY
Qty: 90 TABLET | Refills: 3 | Status: SHIPPED | OUTPATIENT
Start: 2023-02-21 | End: 2023-12-01

## 2023-02-21 RX ORDER — HYDROCHLOROTHIAZIDE 25 MG/1
25 TABLET ORAL DAILY
Qty: 90 TABLET | Refills: 3 | Status: SHIPPED | OUTPATIENT
Start: 2023-02-21 | End: 2024-02-22

## 2023-02-21 RX ORDER — FLUTICASONE PROPIONATE 50 MCG
1-2 SPRAY, SUSPENSION (ML) NASAL DAILY
Qty: 48 G | Refills: 2 | Status: SHIPPED | OUTPATIENT
Start: 2023-02-21 | End: 2024-02-22

## 2023-02-21 ASSESSMENT — ACTIVITIES OF DAILY LIVING (ADL): CURRENT_FUNCTION: NO ASSISTANCE NEEDED

## 2023-02-21 NOTE — PROGRESS NOTES
"SUBJECTIVE:   Chivo is a 77 year old who presents for Preventive Visit.    Patient has been advised of split billing requirements and indicates understanding: Yes     Are you in the first 12 months of your Medicare coverage?  No    Healthy Habits:     In general, how would you rate your overall health?  Excellent    Duration of exercise:  15-30 minutes    Do you usually eat at least 4 servings of fruit and vegetables a day, include whole grains    & fiber and avoid regularly eating high fat or \"junk\" foods?  Yes    Taking medications regularly:  Yes    Medication side effects:  None    Ability to successfully perform activities of daily living:  No assistance needed    Home Safety:  Lack of grab bars in the bathroom    Hearing Impairment:  No hearing concerns    In the past 6 months, have you been bothered by leaking of urine?  No    In general, how would you rate your overall mental or emotional health?  Excellent      PHQ-2 Total Score: 0    Additional concerns today:  Yes    Have you ever done Advance Care Planning? (For example, a Health Directive, POLST, or a discussion with a medical provider or your loved ones about your wishes): No, advance care planning information given to patient to review.  Patient plans to discuss their wishes with loved ones or provider.      Fall risk  Fallen 2 or more times in the past year?: No  Any fall with injury in the past year?: No    Cognitive Screening   1) Repeat 3 items (Leader, Season, Table)    2) Clock draw: NORMAL  3) 3 item recall: Recalls 3 objects  Results: 3 items recalled: COGNITIVE IMPAIRMENT LESS LIKELY    Do you have sleep apnea, excessive snoring or daytime drowsiness?: no    Reviewed and updated as needed this visit by clinical staff  Tobacco  Allergies  Meds  Problems  Med Hx  Surg Hx  Fam Hx        Reviewed and updated as needed this visit by Provider   Tobacco  Allergies  Meds  Problems  Med Hx  Surg Hx  Fam Hx        Social History "     Tobacco Use     Smoking status: Never     Smokeless tobacco: Never   Substance Use Topics     Alcohol use: Yes     Alcohol/week: 11.7 standard drinks     Comment: 1-2 beers nightly     If you drink alcohol do you typically have >3 drinks per day or >7 drinks per week? No    Alcohol Use 2/21/2023   Prescreen: >3 drinks/day or >7 drinks/week? -   Prescreen: >3 drinks/day or >7 drinks/week? No   AUDIT SCORE  -     Current providers sharing in care for this patient include:   Patient Care Team:  Nash Walker MD as PCP - General  Garrett Gonzalez MD as MD (Urology)  Aurora Mckeon, RN as Specialty Care Coordinator (Urology)  Josué Gutiérrez DO as MD (Family Practice)  Franky Oneal MD as MD (Urology)  Franky Oneal MD as Assigned Surgical Provider  Nash Walker MD as Assigned PCP  Travis Amaya MD as Assigned Neuroscience Provider    The following health maintenance items are reviewed in Epic and correct as of today:  Health Maintenance   Topic Date Due     COVID-19 Vaccine (4 - Booster for Pfizer series) 03/31/2022     INFLUENZA VACCINE (1) 09/01/2022     CMP  02/17/2023     LIPID  02/17/2023     MICROALBUMIN  02/17/2023     PSA  02/17/2023     CBC  09/20/2023     MEDICARE ANNUAL WELLNESS VISIT  02/21/2024     ANNUAL REVIEW OF HM ORDERS  02/21/2024     FALL RISK ASSESSMENT  02/21/2024     ADVANCE CARE PLANNING  02/21/2028     DTAP/TDAP/TD IMMUNIZATION (4 - Td or Tdap) 11/23/2032     HEPATITIS C SCREENING  Completed     PHQ-2 (once per calendar year)  Completed     Pneumococcal Vaccine: 65+ Years  Completed     ZOSTER IMMUNIZATION  Addressed     IPV IMMUNIZATION  Aged Out     MENINGITIS IMMUNIZATION  Aged Out     COLORECTAL CANCER SCREENING  Discontinued     Review of Systems   Constitutional: Negative for chills and fever.   HENT: Negative for congestion, ear pain, hearing loss and sore throat.    Eyes: Negative for pain and visual disturbance.   Respiratory: Negative for cough and  "shortness of breath.    Cardiovascular: Negative for chest pain, palpitations and peripheral edema.   Gastrointestinal: Negative for abdominal pain, constipation, diarrhea, heartburn, hematochezia and nausea.   Genitourinary: Positive for impotence. Negative for dysuria, frequency, genital sores, hematuria, penile discharge and urgency.   Musculoskeletal: Negative for arthralgias, joint swelling and myalgias.   Skin: Negative for rash.   Neurological: Negative for dizziness, weakness, headaches and paresthesias.   Psychiatric/Behavioral: Negative for mood changes. The patient is not nervous/anxious.        OBJECTIVE:   /80 (BP Location: Left arm, Patient Position: Chair, Cuff Size: Adult Large)   Pulse 77   Temp 97  F (36.1  C) (Tympanic)   Resp 16   Ht 1.727 m (5' 8\")   Wt 78.5 kg (173 lb)   SpO2 97%   BMI 26.30 kg/m   Estimated body mass index is 26.3 kg/m  as calculated from the following:    Height as of this encounter: 1.727 m (5' 8\").    Weight as of this encounter: 78.5 kg (173 lb).  EXAM:   GENERAL: healthy, alert and no distress  EYES: Eyes grossly normal to inspection, PERRL and conjunctivae and sclerae normal  HENT: ear canals and TM's normal, nose and mouth without ulcers or lesions  NECK: no adenopathy, no asymmetry, masses, or scars and thyroid normal to palpation  RESP: lungs clear to auscultation - no rales, rhonchi or wheezes  BREAST: normal without masses, tenderness or nipple discharge and no palpable axillary masses or adenopathy  CV: regular rate and rhythm, normal S1 S2, no S3 or S4, no murmur, click or rub, no peripheral edema and peripheral pulses strong  ABDOMEN: soft, nontender, no hepatosplenomegaly, no masses and bowel sounds normal   (male): normal male genitalia without lesions or urethral discharge, no hernia  MS: no gross musculoskeletal defects noted, no edema  SKIN: no suspicious lesions or rashes  NEURO: Normal strength and tone, mentation intact and speech " "normal  PSYCH: mentation appears normal, affect normal/bright  LYMPH: no cervical, supraclavicular, axillary, or inguinal adenopathy    ASSESSMENT / PLAN:     Encounter for Medicare annual wellness exam  - Immunizations discussed (Influenza and COVID) - Would like to wait on these     Hypertension goal BP (blood pressure) < 130/80  Controlled. Continue medications  - COMPREHENSIVE METABOLIC PANEL  - Albumin Random Urine Quantitative with Creat Ratio  - amLODIPine (NORVASC) 5 MG tablet   - losartan (COZAAR) 50 MG tablet    - hydrochlorothiazide (HYDRODIURIL) 25 MG tablet    - potassium chloride ER (KLOR-CON) 20 MEQ CR tablet      Hyperlipidemia LDL goal <100  Controlled. Continue medication.   - Lipid panel reflex to direct LDL Non-fasting  - atorvastatin (LIPITOR) 10 MG tablet      Raynaud's phenomenon without gangrene  Controlled. Continue medication   - amLODIPine (NORVASC) 5 MG tablet     Seasonal allergic rhinitis, unspecified trigger  - fluticasone (FLONASE) 50 MCG/ACT nasal spray      h/o prostate cancer (H) - S/p prostatectomy at 56 yo (2001)  - PSA, tumor marker    End of Life Planning:  Patient currently has an advanced directive: No.  I have verified the patient's ablity to prepare an advanced directive/make health care decisions.  Literature was provided to assist patient in preparing an advanced directive.     COUNSELING:  Reviewed preventive health counseling, as reflected in patient instructions.     Estimated body mass index is 26.3 kg/m  as calculated from the following:    Height as of this encounter: 1.727 m (5' 8\").    Weight as of this encounter: 78.5 kg (173 lb).    He reports that he has never smoked. He has never used smokeless tobacco.    Appropriate preventive services were discussed with this patient, including applicable screening as appropriate for cardiovascular disease, diabetes, osteopenia/osteoporosis, and glaucoma.  As appropriate for age/gender, discussed screening for colorectal " cancer, prostate cancer, breast cancer, and cervical cancer. Checklist reviewing preventive services available has been given to the patient.    Reviewed patients plan of care and provided an AVS. The Basic Care Plan (routine screening as documented in Health Maintenance) for Steven meets the Care Plan requirement. This Care Plan has been established and reviewed with the Patient.    Return in about 53 weeks (around 2/27/2024) for Annual Wellness Visit.           Joaquin Walker MD     [address] [address]  [email or web address]     Office: [phone]     Identified Health Risks:

## 2023-02-21 NOTE — PATIENT INSTRUCTIONS
Patient Education   Personalized Prevention Plan  You are due for the preventive services outlined below.  Your care team is available to assist you in scheduling these services.  If you have already completed any of these items, please share that information with your care team to update in your medical record.  Health Maintenance Due   Topic Date Due     COVID-19 Vaccine (4 - Booster for Pfizer series) 03/31/2022     Flu Vaccine (1) 09/01/2022     Comprehensive Metabolic Panel  02/17/2023     Cholesterol Lab  02/17/2023     Kidney Microalbumin Urine Test  02/17/2023     ANNUAL REVIEW OF HM ORDERS  02/17/2023     Prostate Test  02/17/2023

## 2023-02-22 ENCOUNTER — MYC MEDICAL ADVICE (OUTPATIENT)
Dept: FAMILY MEDICINE | Facility: CLINIC | Age: 78
End: 2023-02-22
Payer: MEDICARE

## 2023-02-22 NOTE — TELEPHONE ENCOUNTER
Please see my chart message below     Please review and advise     Thank you     Brisa Silveira RN, BSN  [facility]

## 2023-02-24 NOTE — RESULT ENCOUNTER NOTE
Dear Chivo,    Here is a summary of your recent test results:  -PSA (prostate specific antigen) test is normal.  This indicates a low likelihood of prostate cancer.  ADVISE: rechecking this in 1 year.  -Cholesterol levels are at your goal levels.  ADVISE: continuing your medication, a regular exercise program with at least 150 minutes of aerobic exercise per week, and eating a low saturated fat/low carbohydrate diet.  Also, you should recheck this fasting cholesterol panel in 12 months.  -Liver and gallbladder tests (ALT,AST, Alk phos,bilirubin) are normal.  -Kidney function (GFR) is normal.  -Sodium is normal.  -Potassium is normal.  -Calcium is normal.  -Chloride is essentially normal and nothing to worry about.  -Glucose is slight elevated and may be a sign of early diabetes (prediabetes). ADVISE:: eating a low carbohydrate diet, exercising, trying to lose weight (if necessary) and rechecking your glucose level in 3 months.  Your most recent glucose values were 111, 89,115,120, 109, 109, 65. (Most recent value was 111)  -Microalbumin (urine protein) test is essentially normal.  ADVISE: rechecking this annually.    For additional lab test information, www.testing.com is a very good reference.    In addition, here is a list of due or overdue Health Maintenance reminders:  COVID-19 Vaccine(4 - Booster for Pfizer series) due on 03/31/2022  Flu Vaccine(1) due on 09/01/2022    Please call us at [phone] (or use [employer]) to address the above recommendations if needed.           Thank you very much for trusting me and [facility].     Have a peaceful day.    Healthy regards,  Joaquin Walker MD

## 2023-05-22 ENCOUNTER — OFFICE VISIT (OUTPATIENT)
Dept: FAMILY MEDICINE | Facility: CLINIC | Age: 78
End: 2023-05-22
Payer: MEDICARE

## 2023-05-22 ENCOUNTER — LAB (OUTPATIENT)
Dept: LAB | Facility: CLINIC | Age: 78
End: 2023-05-22
Payer: MEDICARE

## 2023-05-22 VITALS
TEMPERATURE: 97.1 F | HEART RATE: 78 BPM | DIASTOLIC BLOOD PRESSURE: 73 MMHG | SYSTOLIC BLOOD PRESSURE: 113 MMHG | BODY MASS INDEX: 25.72 KG/M2 | HEIGHT: 68 IN | WEIGHT: 169.7 LBS | OXYGEN SATURATION: 98 % | RESPIRATION RATE: 19 BRPM

## 2023-05-22 DIAGNOSIS — R73.09 ELEVATED GLUCOSE: Primary | ICD-10-CM

## 2023-05-22 DIAGNOSIS — M25.511 BILATERAL SHOULDER PAIN, UNSPECIFIED CHRONICITY: ICD-10-CM

## 2023-05-22 DIAGNOSIS — R73.09 ELEVATED GLUCOSE: ICD-10-CM

## 2023-05-22 DIAGNOSIS — M25.512 BILATERAL SHOULDER PAIN, UNSPECIFIED CHRONICITY: ICD-10-CM

## 2023-05-22 LAB
FASTING STATUS PATIENT QL REPORTED: YES
GLUCOSE SERPL-MCNC: 112 MG/DL (ref 70–99)
HBA1C MFR BLD: 5.4 % (ref 0–5.6)

## 2023-05-22 PROCEDURE — 99213 OFFICE O/P EST LOW 20 MIN: CPT | Performed by: FAMILY MEDICINE

## 2023-05-22 PROCEDURE — 36415 COLL VENOUS BLD VENIPUNCTURE: CPT

## 2023-05-22 PROCEDURE — 83036 HEMOGLOBIN GLYCOSYLATED A1C: CPT

## 2023-05-22 PROCEDURE — 82947 ASSAY GLUCOSE BLOOD QUANT: CPT

## 2023-05-22 NOTE — PROGRESS NOTES
"  Assessment & Plan   Elevated glucose  Previously and better today.  Has a sweet tooth.  Less is better.     Bilateral shoulder pain, unspecified chronicity  Previous injury/pain is better /except with ext rotation. Exercises and stretching reviewed. -      BMI:   Estimated body mass index is 25.8 kg/m  as calculated from the following:    Height as of this encounter: 1.727 m (5' 8\").    Weight as of this encounter: 77 kg (169 lb 11.2 oz).       Return in about 9 months (around 2/21/2024) for wellness exam with fasting labs with Joaquin Walker MD.   Follow-up Visit   Expected date:  Feb 22, 2024 (Approximate)      Follow Up Appointment Details:     Follow-up with whom?: Me    Follow-Up for what?: Medicare Wellness    Any Additional Chronic Condition Management?:  Hypertension  Hyperlipidemia       Welcome or Annual?: Annual Wellness    How?: In Person    Is this an as-needed follow-up?: No                        Joaquin Walker MD      [address] [address]  [employer]   Office: [phone]       Eitan Waldron is a 78 year old, presenting for the following health issues:  RECHECK (Glucose labs)        5/22/2023     8:28 AM   Additional Questions   Roomed by Sierra Spence     HPI     Chivo is wishing to discuss the lab results from today regarding his glucose levels.      Results for orders placed or performed in visit on 05/22/23   Hemoglobin A1c     Status: Normal   Result Value Ref Range    Hemoglobin A1C 5.4 0.0 - 5.6 %     Previous glucose mildly elevated.    Review of Systems     Objective    /73   Pulse 78   Temp 97.1  F (36.2  C) (Tympanic)   Resp 19   Ht 1.727 m (5' 8\")   Wt 77 kg (169 lb 11.2 oz)   SpO2 98%   BMI 25.80 kg/m    Body mass index is 25.8 kg/m .  Physical Exam   GENERAL: healthy, alert and no distress  NECK: no adenopathy, no asymmetry, masses, or scars and thyroid normal to palpation  RESP: lungs clear to auscultation - no " rales, rhonchi or wheezes  CV: regular rate and rhythm, normal S1 S2, no S3 or S4, no murmur, click or rub, no peripheral edema and peripheral pulses strong  ABDOMEN: soft, nontender, no hepatosplenomegaly, no masses and bowel sounds normal  MS: no gross musculoskeletal defects noted, no edema

## 2023-05-22 NOTE — PATIENT INSTRUCTIONS
Patient Education What Is Diabetes?  If you have diabetes, your body does not make enough insulin (a hormone), or the insulin it makes does not work the way it should. Diabetes is a lifelong disease.  Glucose (sugar) is your body's main source of energy. Insulin carries glucose from the bloodstream into your body's cells. But if you have diabetes, glucose builds up in your blood. This is called high blood glucose (high blood sugar).  High blood glucose can lead to damage in many parts of your body, including your eyes, kidneys, heart, blood vessels, nerves and skin.  What are the symptoms of diabetes?  Symptoms include:  Extreme thirst  Needing to urinate more often  Headache  Hunger  Blurred vision  Feeling drowsy or tired  Slow healing after an illness or injury  Frequent infections.  What should I do if I have diabetes?  Your first step is to learn how to manage your diabetes. The goal is to keep your blood glucose as close to normal as possible. (A normal level is 70 to 100.) By doing this, you can prevent or control damage to your body.  To manage your diabetes, you will need to:  Eat a wide range of healthy foods.  Manage your weight.  Be physically active.  Test your blood glucose as prescribed.  Control your blood pressure and cholesterol.  Take your medicines as prescribed.  Are there different kinds of diabetes?  There are two basic kinds of diabetes: type 1 and type 2.  Type 1 diabetes  Type 1 diabetes occurs when the cells that make insulin are destroyed by your body's immune system. Your body can no longer make insulin on its own. People who have type 1 diabetes must take insulin to manage it.  Type 2 diabetes  Type 2 diabetes occurs when the cells of your body cannot use insulin or glucose normally. Over time, your body cannot make enough insulin to meet your body's needs. This is the most common kind of diabetes.  You are more likely to develop type 2 diabetes if you:  Are overweight  Have high  blood pressure  Have high cholesterol  Are not physically active  Have a family history of type 2 diabetes  Are , /, ,  American or   Are a woman who has given birth to a baby weighing over 9 pounds, or you have had gestational diabetes (diabetes that occurs in pregnancy).  For informational purposes only. Not to replace the advice of your health care provider.  Copyright   2006 [facility]. All rights reserved. [employer] [id number] - REV 12/15.     Patient Education   Diet: Diabetes  Food is an important tool that you can use to control diabetes and stay healthy. Eating well-balanced meals in the correct amounts will help you control your blood glucose levels and prevent low blood sugar reactions. It will also help you reduce the health risks of diabetes. There is no one specific diet that is right for everyone with diabetes. But there are general guidelines to follow. A registered dietitian (RD) will create a tailored diet approach that s just right for you. He or she will also help you plan healthy meals and snacks. If you have any questions, call your dietitian for advice.     Guidelines for success  Talk with your healthcare provider before starting a diabetes diet or weight loss program. If you haven't talked with a dietitian yet, ask your provider for a referral. The following guidelines can help you succeed:  Select foods from the 6 food groups below. Your dietitian will help you find food choices within each group. He or she will also show you serving sizes and how many servings you can have at each meal.  Grains, beans, and starchy vegetables  Vegetables  Fruit  Milk or yogurt  Meat, poultry, fish, or tofu  Healthy fats  Check your blood sugar levels as directed by your provider. Take any medicine as prescribed by your provider.  Learn to read food labels and pick the right portion sizes.  Eat only the amount of food in your meal  plan. Eat about the same amount of food at regular times each day. Don t skip meals. Eat meals 4 to 5 hours apart, with snacks in between.  Limit alcohol. It raises blood sugar levels. Drink water or calorie-free diet drinks that use safe sweeteners.  Eat less fat to help lower your risk of heart disease. Use nonfat or low-fat dairy products and lean meats. Avoid fried foods. Use cooking oils that are unsaturated, such as olive, canola, or peanut oil.  Talk with your dietitian about safe sugar substitutes.  Avoid added salt. It can contribute to high blood pressure, which can cause heart disease. People with diabetes already have a risk of high blood pressure and heart disease.  Stay at a healthy weight. If you need to lose weight, cut down on your portion sizes. But don t skip meals. Exercise is an important part of any weight management program. Talk with your provider about an exercise program that s right for you.  For more information about the best diet plan for you, talk with a registered dietitian (RD). To find an RD in your area, contact:  Academy of Nutrition and Dietetics www.eatright.org  The American Diabetes Association [phone] www.diabetes.org  Date Last Reviewed: 8/1/2016 2000-2018 [employer]. [address] [place] [address]. All rights reserved. This information is not intended as a substitute for professional medical care. Always follow your healthcare professional's instructions.         Patient Education   Healthy Meals for Diabetes     A healthcare provider will help you develop a meal plan that fits your needs.     Ask your healthcare team to help you make a meal plan that fits your needs. Your meal plan tells you when to eat your meals and snacks, what kinds of foods to eat, and how much of each food to eat. You don t have to give up all the foods you like. But you do need to follow some guidelines.  Choose healthy carbohydrates  Starches, sugars, and fiber are  all types of carbohydrates. Fiber can help lower your cholesterol and triglycerides. Fiber is also healthy for your heart. You should have 20 to 35 grams of total fiber each day. Fiber-rich foods include:  Whole-grain breads and cereals  Bulgur wheat  Brown rice     Whole-wheat pasta  Fruits and vegetables  Dry beans, and peas   Keep track of the amount of carbohydrates you eat. This can help you keep the right balance of physical activity and medicine. The amount of carbohydrates needed will vary for each person. It depends on many things such as your health, the medicines you take, and how active you are. Your healthcare team will help you figure out the right amount of carbohydrates for you. You may start with around 45 to 60 grams of carbohydrates per meal, depending on your situation.   Here are some examples of foods containing about 15 grams of carbohydrates (1 serving of carbohydrates):  1/2 cup of canned or frozen fruit  A small piece of fresh fruit (4 ounces)  1 slice of bread  1/2 cup of oatmeal  1/3 cup of rice  4 to 6 crackers  1/2 English muffin  1/2 cup of black beans  1/4 of a large baked potato (3 ounces)  2/3 cup of plain fat-free yogurt  1 cup of soup  1/2 cup of casserole  6 chicken nuggets  2-inch-square brownie or cake without frosting  2 small cookies  1/2 cup of ice cream or sherbet  Choose healthy protein foods  Eating protein that is low in fat can help you control your weight. It also helps keep your heart healthy. Low-fat protein foods include:  Fish  Plant proteins, such as dry beans and peas, nuts, and soy products like tofu and soymilk  Lean meat with all visible fat removed  Poultry with the skin removed  Low-fat or nonfat milk, cheese, and yogurt  Limit unhealthy fats and sugar  Saturated and trans fats are unhealthy for your heart. They raise LDL (bad) cholesterol. Fat is also high in calories, so it can make you gain weight. To cut down on unhealthy fats and sugar, limit these  foods:  Butter or margarine  Palm and palm kernel oils and coconut oil  Cream  Cheese  Alves  Lunch meats     Ice cream  Sweet bakery goods such as pies, muffins, and donuts  Jams and jellies  Candy bars  Regular sodas   How much to eat  The amount of food you eat affects your blood sugar. It also affects your weight. Your healthcare team will tell you how much of each type of food you should eat.  Use measuring cups and spoons and a food scale to measure serving sizes.  Learn what a correct serving size looks like on your plate. This will help when you are away from home and can t measure your servings.  Eat only the number of servings given on your meal plan for each food. Don t take seconds.  Learn to read food labels. Be sure to look at serving size, total carbohydrates, fiber, calories, sugar, and saturated and trans fats. Look for healthier alternatives to foods that have added sugar.  Plan ahead for parties so you can still have a good time without going overboard with unhealthy food choices. Set a good example yourself by bringing a healthy dish to pot lucks.   Choose healthy snacks  When it comes to snacks, we usually think about foods with added sugar and fats. But there are many other options for healthier snack choices. Here are a few snack ideas to choose from:  Snacks with less than 5 grams of carbohydrates  1 piece of string cheese  3 celery sticks plus 1 tablespoon of peanut butter  5 cherry tomatoes plus 1 tablespoon of ranch dressing  1 hard-boiled egg  1/4 cup of fresh blueberries   5 baby carrots  1 cup of light popcorn  1/2 cup of sugar-free gelatin  15 almonds  Snacks with about 10 to 20 grams of carbohydrates  1/3 cup of hummus plus 1 cup of fresh cut nonstarchy vegetables (carrots, green peppers, broccoli, celery, or a combination)  1/2 cup of fresh or canned fruit plus 1/4 cup of cottage cheese  1/2 cup of tuna salad with 4 crackers  2 rice cakes and a tablespoon of peanut butter  1 small  apple or orange  3 cups light popcorn  1/2 of a turkey sandwich (1 slice of whole-wheat bread, 2 ounces of turkey, and mustard)  Portion sizes are important to controlling your blood sugar and staying at a healthy weight. Stock up on healthy snack items so you always have them on hand.  When to eat  Your meal plan will likely include breakfast, lunch, dinner, and some snacks.  Try to eat your meals and snacks at about the same times each day.  Eat all your meals and snacks. Skipping a meal or snack can make your blood sugar drop too low. It can also cause you to eat too much at the next meal or snack. Then your blood sugar could get too high.  Date Last Reviewed: 7/1/2016 2000-2018 [employer] [address] [place] [address], [place] [address]. All rights reserved. This information is not intended as a substitute for professional medical care. Always follow your healthcare professional's instructions.         Patient Education   Diabetes: Meal Planning    You can help keep your blood sugar level in your target range by eating healthy foods. Your healthcare team can help you create a low-fat, nutritious meal plan. Take an active role in your diabetes management by following your meal plan and working with your healthcare team.  Make your meal plan  A meal plan gives guidelines for the types and amounts of food you should eat. The goal is to balance food and insulin (or other diabetes medications) so your blood sugars will be in your target range. Your dietitian will help you make a flexible meal plan that includes many foods that you like.  Watch serving sizes  Your meal plan will group foods by servings. To learn how much a serving is, start by measuring food portions at each meal. Soon you ll know what a serving looks like on your plate. Ask your healthcare provider about how to balance servings of different foods.  Eat from all the food groups  The basis of a healthy meal plan is variety (eating lots of  different foods). Choose lean meats, fresh fruits and vegetables, whole grains, and low-fat or nonfat dairy products. Eating a wide variety of foods provides the nutrients your body needs. It can also keep you from getting bored with your meal plan.  Learn about carbohydrates, fats, and protein  Carbohydrates are starches, sugars, and fiber. They are found in many foods, including fruit, bread, pasta, milk, and sweets. Of all the foods you eat, carbohydrates have the most effect on your blood sugar. Your dietitian may teach you about carb counting, a way to figure out the number of carbohydrates in a meal.  Fats have the most calories. They also have the most effect on your weight and your risk of heart disease. When you have diabetes, it s important to control your weight and protect your heart. Foods that are high in fat include whole milk, cheese, snack foods, and desserts.  Protein is important for building and repairing muscles and bones. Choose low-fat protein sources, such as fish, egg whites, and skinless chicken.  Reduce liquid sugars  Extra calories from sodas, sports drinks, and fruit drinks make it hard to keep blood sugar in range. Cut as many liquid sugars from your meal plan as you can.  This includes most fruit juices, which are often high in natural or added sugar. Instead, drink plenty of water and other sugar-free beverages.  Eat less fat  If you need to lose weight, try to reduce the amount of fat in your diet. This can also help lower your cholesterol level to keep blood vessels healthier. Cut fat by using only small amounts of liquid oil for cooking. Read food labels carefully to avoid foods with unhealthy trans fats.  Timing your meals  When it comes to blood sugar control, when you eat is as important as what you eat. You may need to eat several small meals spaced evenly throughout the day to stay in your target range. So don t skip breakfast or wait until late in the day to get most of your  calories. Doing so can cause your blood sugar to rise too high or fall too low.   Date Last Reviewed: 3/1/2016    1415-3045 [employer]. [address] [facility], [place] [address]. All rights reserved. This information is not intended as a substitute for professional medical care. Always follow your healthcare professional's instructions.         Patient Education   Long-Term Complications of Diabetes    Diabetes can cause health problems over time. These are called complications. They are more likely to happen if your blood sugar is often too high. Over time, high blood sugar can damage blood vessels in your body. It is important to keep your blood sugar in your target range. This can help prevent or delay complications from diabetes.  Possible complications  Complications of diabetes include:  Eye problems, including damage to the blood vessels in the eyes (retinopathy), pressure in the eye (glaucoma), and clouding of the eye s lens (a cataract). Eye problems can eventually lead to irreversible blindness.   Tooth and gum problems (periodontal disease), causing loss of teeth and bone  Blood vessel (vascular) disease leading to circulation problems, heart attack or stroke, or a need for amputation of a limb   Problems with sexual function leading to erectile dysfunction in men and sexual discomfort in women   Kidney disease (nephropathy) can eventually lead to kidney failure, which may require dialysis or kidney transplant   Nerve problems (neuropathy), causing pain or loss of feeling in your feet and other parts of your body, potentially leading to an amputation of a limb   High blood pressure (hypertension), putting strain on your heart and blood vessels  Serious infections, possibly leading to loss of toes, feet, or limbs  How to avoid complications  The serious consequences of these complications may be avoidable for most people with diabetes by managing your blood glucose, blood pressure, and  cholesterol levels. This can help you feel better and stay healthy. You can manage diabetes by tracking your blood sugar. You can also eat healthy and exercise to avoid gaining weight. And you should take medicine if directed by your healthcare provider.  Date Last Reviewed: 5/1/2016 2000-2018 [employer]. [address] [place] [address], [place] [address]. All rights reserved. This information is not intended as a substitute for professional medical care. Always follow your healthcare professional's instructions.

## 2023-05-23 NOTE — RESULT ENCOUNTER NOTE
Dear Chivo,    Here is a summary of your recent test results:  -Glucose is slight elevated and may be a sign of early diabetes (prediabetes). ADVISE:: eating a low carbohydrate diet, exercising, trying to lose weight (if necessary) and rechecking your glucose level in 12 months.             Thank you very much for trusting me and [facility].     Have a peaceful day.    Healthy regards,  Joaquin Walker MD

## 2023-07-13 ENCOUNTER — MYC MEDICAL ADVICE (OUTPATIENT)
Dept: FAMILY MEDICINE | Facility: CLINIC | Age: 78
End: 2023-07-13
Payer: MEDICARE

## 2023-07-13 DIAGNOSIS — E78.5 HYPERLIPIDEMIA LDL GOAL <100: Primary | ICD-10-CM

## 2023-07-13 DIAGNOSIS — Z82.49 FAMILY HISTORY OF CORONARY ARTERY DISEASE: ICD-10-CM

## 2023-07-13 DIAGNOSIS — I10 HYPERTENSION GOAL BP (BLOOD PRESSURE) < 130/80: ICD-10-CM

## 2023-07-18 NOTE — TELEPHONE ENCOUNTER
Please review and advise [employer] message.    LOV 5/22/23 2/21/23- annual wellness    Thank you!  Lin ZIMMERMAN RN   [facility] Triage

## 2023-09-07 ENCOUNTER — HOSPITAL ENCOUNTER (OUTPATIENT)
Dept: CARDIOLOGY | Facility: CLINIC | Age: 78
Discharge: HOME | End: 2023-09-07
Attending: FAMILY MEDICINE | Admitting: FAMILY MEDICINE
Payer: MEDICARE

## 2023-09-07 DIAGNOSIS — Z82.49 FAMILY HISTORY OF CORONARY ARTERY DISEASE: ICD-10-CM

## 2023-09-07 DIAGNOSIS — E78.5 HYPERLIPIDEMIA LDL GOAL <100: ICD-10-CM

## 2023-09-07 DIAGNOSIS — I10 HYPERTENSION GOAL BP (BLOOD PRESSURE) < 130/80: ICD-10-CM

## 2023-09-07 PROCEDURE — 93325 DOPPLER ECHO COLOR FLOW MAPG: CPT | Mod: 26 | Performed by: INTERNAL MEDICINE

## 2023-09-07 PROCEDURE — 93321 DOPPLER ECHO F-UP/LMTD STD: CPT | Mod: 26 | Performed by: INTERNAL MEDICINE

## 2023-09-07 PROCEDURE — 93018 CV STRESS TEST I&R ONLY: CPT | Performed by: INTERNAL MEDICINE

## 2023-09-07 PROCEDURE — 93350 STRESS TTE ONLY: CPT | Mod: 26 | Performed by: INTERNAL MEDICINE

## 2023-09-07 PROCEDURE — 93016 CV STRESS TEST SUPVJ ONLY: CPT | Performed by: INTERNAL MEDICINE

## 2023-09-07 PROCEDURE — 93350 STRESS TTE ONLY: CPT | Mod: TC

## 2023-09-07 NOTE — RESULT ENCOUNTER NOTE
Dear Chivo,    Here is a summary of your recent test results:  -Normal stress echocardiogram-this is good news.    For additional lab test information, www.testing.com is a very good reference.    In addition, here is a list of due or overdue Health Maintenance reminders:  COVID-19 Vaccine(4 - Pfizer series) due on 03/31/2022  Flu Vaccine(1) due on 09/01/2023  Complete Blood Count due on 09/20/2023    Please call us at [phone] (or use wywy) to address the above recommendations if needed.           Thank you very much for trusting me and [facility].     Have a peaceful day.    Healthy regards,  Joaquin Walker MD

## 2023-11-30 ENCOUNTER — MYC MEDICAL ADVICE (OUTPATIENT)
Dept: FAMILY MEDICINE | Facility: CLINIC | Age: 78
End: 2023-11-30
Payer: MEDICARE

## 2023-11-30 DIAGNOSIS — I10 HYPERTENSION GOAL BP (BLOOD PRESSURE) < 130/80: ICD-10-CM

## 2023-11-30 DIAGNOSIS — I73.00 RAYNAUD'S PHENOMENON WITHOUT GANGRENE: ICD-10-CM

## 2023-12-01 RX ORDER — AMLODIPINE BESYLATE 10 MG/1
5 TABLET ORAL DAILY
Qty: 45 TABLET | Refills: 3 | Status: SHIPPED | OUTPATIENT
Start: 2023-12-01 | End: 2024-02-22

## 2023-12-14 ENCOUNTER — MYC MEDICAL ADVICE (OUTPATIENT)
Dept: FAMILY MEDICINE | Facility: CLINIC | Age: 78
End: 2023-12-14
Payer: MEDICARE

## 2023-12-15 NOTE — TELEPHONE ENCOUNTER
Message seen too late. Keep 12/18/23 appointment unless can be seen elsewhere.    Tania Senior, CNP

## 2023-12-15 NOTE — TELEPHONE ENCOUNTER
Patient is calling to schedule appointment for possible sinus infection, patient prefers to be seen in-person today. Patient is currently scheduled for 12/18/23 to be seen. Is it possible for patient to be seen today using LOY slot 12:30? If patient can't be seen today and cannot wait to be seen Monday, he will go to urgent care. Please advise.    Please call patient

## 2023-12-18 ENCOUNTER — OFFICE VISIT (OUTPATIENT)
Dept: FAMILY MEDICINE | Facility: CLINIC | Age: 78
End: 2023-12-18
Payer: MEDICARE

## 2023-12-18 VITALS
BODY MASS INDEX: 26.52 KG/M2 | SYSTOLIC BLOOD PRESSURE: 114 MMHG | RESPIRATION RATE: 16 BRPM | TEMPERATURE: 97.1 F | WEIGHT: 175 LBS | OXYGEN SATURATION: 98 % | DIASTOLIC BLOOD PRESSURE: 60 MMHG | HEART RATE: 89 BPM | HEIGHT: 68 IN

## 2023-12-18 DIAGNOSIS — J01.10 ACUTE NON-RECURRENT FRONTAL SINUSITIS: Primary | ICD-10-CM

## 2023-12-18 PROCEDURE — 99213 OFFICE O/P EST LOW 20 MIN: CPT | Performed by: NURSE PRACTITIONER

## 2023-12-18 RX ORDER — AZITHROMYCIN 250 MG/1
TABLET, FILM COATED ORAL
Qty: 6 TABLET | Refills: 0 | Status: SHIPPED | OUTPATIENT
Start: 2023-12-18 | End: 2024-02-22

## 2023-12-18 RX ORDER — RESPIRATORY SYNCYTIAL VIRUS VACCINE 120MCG/0.5
0.5 KIT INTRAMUSCULAR ONCE
Qty: 1 EACH | Refills: 0 | Status: CANCELLED | OUTPATIENT
Start: 2023-12-18 | End: 2023-12-18

## 2023-12-18 NOTE — PROGRESS NOTES
"  Assessment & Plan     Acute non-recurrent frontal sinusitis  >3 weeks pressure,drainage, post nasal drip. Treat as below. Follow up if not improving as expected.  - azithromycin (ZITHROMAX) 250 MG tablet  Dispense: 6 tablet; Refill: 0      Prescription drug management         BMI:   Estimated body mass index is 26.61 kg/m  as calculated from the following:    Height as of this encounter: 1.727 m (5' 8\").    Weight as of this encounter: 79.4 kg (175 lb).           Tania Senior [facility] PRIOR MARK Waldron is a 78 year old, presenting for the following health issues:  Sinus Problem        12/18/2023     9:54 AM   Additional Questions   Roomed by Sakshi MINOR CMA       History of Present Illness       Reason for visit:  Head cold  Symptom onset:  3-4 weeks ago    He eats 2-3 servings of fruits and vegetables daily.He exercises with enough effort to increase his heart rate 10 to 19 minutes per day.    He is taking medications regularly.         Acute Illness  Acute illness concerns: Sinus Pain  Onset/Duration: Thanksgiving  Symptoms:  Fever: No  Chills/Sweats: YES  Headache (location?): YES- sinus headache \  Sinus Pressure: YES  Conjunctivitis:  No  Ear Pain: YES- feels pressure in there  Rhinorrhea: YES  Congestion: YES  Sore Throat: YES  Cough: YES  Wheeze: No  Decreased Appetite: No  Nausea: No  Vomiting: No  Diarrhea: No  Dysuria/Freq.: No  Dysuria or Hematuria: No  Fatigue/Achiness: YES- not as energetic   Sick/Strep Exposure: No  Therapies tried and outcome: Mucinex and tylenol        Review of Systems   Constitutional, HEENT, cardiovascular, pulmonary, GI, , musculoskeletal, neuro, skin, endocrine and psych systems are negative, except as otherwise noted.      Objective    /60   Pulse 89   Temp 97.1  F (36.2  C) (Tympanic)   Resp 16   Ht 1.727 m (5' 8\")   Wt 79.4 kg (175 lb)   SpO2 98%   BMI 26.61 kg/m    Body mass index is 26.61 kg/m .  Physical Exam   GENERAL: " healthy, alert and no distress  HENT: normal cephalic/atraumatic, ear canals and TM's normal, nasal mucosa edematous , oropharynx clear, oral mucous membranes moist, and sinuses: maxillary tenderness on right  NECK: bilateral anterior cervical adenopathy, no asymmetry, masses, or scars, and thyroid normal to palpation  RESP: lungs clear to auscultation - no rales, rhonchi or wheezes  CV: regular rate and rhythm, normal S1 S2, no S3 or S4, no murmur, click or rub, no peripheral edema and peripheral pulses strong  ABDOMEN: soft, nontender, no hepatosplenomegaly, no masses and bowel sounds normal  MS: no gross musculoskeletal defects noted, no edema              BERONICA Fernandez     [address] [address]  [email or web address]   Office: [phone]

## 2024-02-15 SDOH — HEALTH STABILITY: PHYSICAL HEALTH: ON AVERAGE, HOW MANY MINUTES DO YOU ENGAGE IN EXERCISE AT THIS LEVEL?: 30 MIN

## 2024-02-15 SDOH — HEALTH STABILITY: PHYSICAL HEALTH: ON AVERAGE, HOW MANY DAYS PER WEEK DO YOU ENGAGE IN MODERATE TO STRENUOUS EXERCISE (LIKE A BRISK WALK)?: 5 DAYS

## 2024-02-15 ASSESSMENT — SOCIAL DETERMINANTS OF HEALTH (SDOH): HOW OFTEN DO YOU GET TOGETHER WITH FRIENDS OR RELATIVES?: THREE TIMES A WEEK

## 2024-02-22 ENCOUNTER — OFFICE VISIT (OUTPATIENT)
Dept: FAMILY MEDICINE | Facility: CLINIC | Age: 79
End: 2024-02-22
Attending: FAMILY MEDICINE
Payer: MEDICARE

## 2024-02-22 VITALS
DIASTOLIC BLOOD PRESSURE: 58 MMHG | SYSTOLIC BLOOD PRESSURE: 114 MMHG | HEIGHT: 68 IN | RESPIRATION RATE: 13 BRPM | HEART RATE: 60 BPM | WEIGHT: 172 LBS | TEMPERATURE: 96.6 F | OXYGEN SATURATION: 100 % | BODY MASS INDEX: 26.07 KG/M2

## 2024-02-22 DIAGNOSIS — E78.5 HYPERLIPIDEMIA LDL GOAL <100: ICD-10-CM

## 2024-02-22 DIAGNOSIS — J30.2 SEASONAL ALLERGIC RHINITIS, UNSPECIFIED TRIGGER: ICD-10-CM

## 2024-02-22 DIAGNOSIS — C61 PROSTATE CANCER (H): ICD-10-CM

## 2024-02-22 DIAGNOSIS — I10 HYPERTENSION GOAL BP (BLOOD PRESSURE) < 130/80: ICD-10-CM

## 2024-02-22 DIAGNOSIS — Z12.5 SCREENING FOR PROSTATE CANCER: ICD-10-CM

## 2024-02-22 DIAGNOSIS — Z00.00 ENCOUNTER FOR MEDICARE ANNUAL WELLNESS EXAM: Primary | ICD-10-CM

## 2024-02-22 DIAGNOSIS — I73.00 RAYNAUD'S PHENOMENON WITHOUT GANGRENE: ICD-10-CM

## 2024-02-22 PROBLEM — N32.89 BLADDER WALL THICKENING: Status: RESOLVED | Noted: 2018-05-17 | Resolved: 2024-02-22

## 2024-02-22 LAB
ALBUMIN SERPL BCG-MCNC: 4.3 G/DL (ref 3.5–5.2)
ALP SERPL-CCNC: 56 U/L (ref 40–150)
ALT SERPL W P-5'-P-CCNC: 18 U/L (ref 0–70)
ANION GAP SERPL CALCULATED.3IONS-SCNC: 9 MMOL/L (ref 7–15)
AST SERPL W P-5'-P-CCNC: 22 U/L (ref 0–45)
BILIRUB SERPL-MCNC: 0.9 MG/DL
BUN SERPL-MCNC: 16.2 MG/DL (ref 8–23)
CALCIUM SERPL-MCNC: 9.5 MG/DL (ref 8.8–10.2)
CHLORIDE SERPL-SCNC: 99 MMOL/L (ref 98–107)
CHOLEST SERPL-MCNC: 165 MG/DL
CREAT SERPL-MCNC: 1.03 MG/DL (ref 0.67–1.17)
CREAT UR-MCNC: 197 MG/DL
DEPRECATED HCO3 PLAS-SCNC: 27 MMOL/L (ref 22–29)
EGFRCR SERPLBLD CKD-EPI 2021: 75 ML/MIN/1.73M2
ERYTHROCYTE [DISTWIDTH] IN BLOOD BY AUTOMATED COUNT: 12.5 % (ref 10–15)
FASTING STATUS PATIENT QL REPORTED: YES
GLUCOSE SERPL-MCNC: 121 MG/DL (ref 70–99)
HCT VFR BLD AUTO: 47.7 % (ref 40–53)
HDLC SERPL-MCNC: 61 MG/DL
HGB BLD-MCNC: 16.8 G/DL (ref 13.3–17.7)
LDLC SERPL CALC-MCNC: 93 MG/DL
MCH RBC QN AUTO: 31.7 PG (ref 26.5–33)
MCHC RBC AUTO-ENTMCNC: 35.2 G/DL (ref 31.5–36.5)
MCV RBC AUTO: 90 FL (ref 78–100)
MICROALBUMIN UR-MCNC: 13.1 MG/L
MICROALBUMIN/CREAT UR: 6.65 MG/G CR (ref 0–17)
NONHDLC SERPL-MCNC: 104 MG/DL
PLATELET # BLD AUTO: 173 10E3/UL (ref 150–450)
POTASSIUM SERPL-SCNC: 4 MMOL/L (ref 3.4–5.3)
PROT SERPL-MCNC: 7.1 G/DL (ref 6.4–8.3)
PSA SERPL DL<=0.01 NG/ML-MCNC: 0.02 NG/ML (ref 0–6.5)
RBC # BLD AUTO: 5.3 10E6/UL (ref 4.4–5.9)
SODIUM SERPL-SCNC: 135 MMOL/L (ref 135–145)
TRIGL SERPL-MCNC: 54 MG/DL
WBC # BLD AUTO: 9.3 10E3/UL (ref 4–11)

## 2024-02-22 PROCEDURE — 82043 UR ALBUMIN QUANTITATIVE: CPT | Performed by: FAMILY MEDICINE

## 2024-02-22 PROCEDURE — G0439 PPPS, SUBSEQ VISIT: HCPCS | Performed by: FAMILY MEDICINE

## 2024-02-22 PROCEDURE — 82570 ASSAY OF URINE CREATININE: CPT | Performed by: FAMILY MEDICINE

## 2024-02-22 PROCEDURE — 99214 OFFICE O/P EST MOD 30 MIN: CPT | Mod: 25 | Performed by: FAMILY MEDICINE

## 2024-02-22 PROCEDURE — 80061 LIPID PANEL: CPT | Performed by: FAMILY MEDICINE

## 2024-02-22 PROCEDURE — 80053 COMPREHEN METABOLIC PANEL: CPT | Performed by: FAMILY MEDICINE

## 2024-02-22 PROCEDURE — 85027 COMPLETE CBC AUTOMATED: CPT | Performed by: FAMILY MEDICINE

## 2024-02-22 PROCEDURE — 36415 COLL VENOUS BLD VENIPUNCTURE: CPT | Performed by: FAMILY MEDICINE

## 2024-02-22 PROCEDURE — G0103 PSA SCREENING: HCPCS | Performed by: FAMILY MEDICINE

## 2024-02-22 RX ORDER — LOSARTAN POTASSIUM 50 MG/1
50 TABLET ORAL DAILY
Qty: 90 TABLET | Refills: 4 | Status: SHIPPED | OUTPATIENT
Start: 2024-02-22

## 2024-02-22 RX ORDER — AMLODIPINE BESYLATE 10 MG/1
5 TABLET ORAL DAILY
Qty: 45 TABLET | Refills: 4 | Status: SHIPPED | OUTPATIENT
Start: 2024-02-22

## 2024-02-22 RX ORDER — POTASSIUM CHLORIDE 1500 MG/1
20 TABLET, EXTENDED RELEASE ORAL DAILY
Qty: 90 TABLET | Refills: 4 | Status: SHIPPED | OUTPATIENT
Start: 2024-02-22

## 2024-02-22 RX ORDER — ATORVASTATIN CALCIUM 10 MG/1
10 TABLET, FILM COATED ORAL DAILY
Qty: 90 TABLET | Refills: 4 | Status: SHIPPED | OUTPATIENT
Start: 2024-02-22

## 2024-02-22 RX ORDER — HYDROCHLOROTHIAZIDE 25 MG/1
25 TABLET ORAL DAILY
Qty: 90 TABLET | Refills: 4 | Status: SHIPPED | OUTPATIENT
Start: 2024-02-22

## 2024-02-22 RX ORDER — FLUTICASONE PROPIONATE 50 MCG
1-2 SPRAY, SUSPENSION (ML) NASAL DAILY
Qty: 48 G | Refills: 4 | Status: SHIPPED | OUTPATIENT
Start: 2024-02-22

## 2024-02-22 NOTE — PATIENT INSTRUCTIONS
Preventive Care Advice   This is general advice given by our system to help you stay healthy. However, your care team may have specific advice just for you. Please talk to your care team about your preventive care needs.  Nutrition  Eat 5 or more servings of fruits and vegetables each day.  Try wheat bread, brown rice and whole grain pasta (instead of white bread, rice, and pasta).  Get enough calcium and vitamin D. Check the label on foods and aim for 100% of the RDA (recommended daily allowance).  Lifestyle  Exercise at least 150 minutes each week  (30 minutes a day, 5 days a week).  Do muscle strengthening activities 2 days a week. These help control your weight and prevent disease.  No smoking.  Wear sunscreen to prevent skin cancer.  Have a dental exam and cleaning every 6 months.  Yearly exams  See your health care team every year to talk about:  Any changes in your health.  Any medicines your care team has prescribed.  Preventive care, family planning, and ways to prevent chronic diseases.  Shots (vaccines)   HPV shots (up to age 26), if you've never had them before.  Hepatitis B shots (up to age 59), if you've never had them before.  COVID-19 shot: Get this shot when it's due.  Flu shot: Get a flu shot every year.  Tetanus shot: Get a tetanus shot every 10 years.  Pneumococcal, hepatitis A, and RSV shots: Ask your care team if you need these based on your risk.  Shingles shot (for age 50 and up)  General health tests  Diabetes screening:  Starting at age 35, Get screened for diabetes at least every 3 years.  If you are younger than age 35, ask your care team if you should be screened for diabetes.  Cholesterol test: At age 39, start having a cholesterol test every 5 years, or more often if advised.  Bone density scan (DEXA): At age 50, ask your care team if you should have this scan for osteoporosis (brittle bones).  Hepatitis C: Get tested at least once in your life.  STIs (sexually transmitted  infections)  Before age 24: Ask your care team if you should be screened for STIs.  After age 24: Get screened for STIs if you're at risk. You are at risk for STIs (including HIV) if:  You are sexually active with more than one person.  You don't use condoms every time.  You or a partner was diagnosed with a sexually transmitted infection.  If you are at risk for HIV, ask about PrEP medicine to prevent HIV.  Get tested for HIV at least once in your life, whether you are at risk for HIV or not.  Cancer screening tests  Cervical cancer screening: If you have a cervix, begin getting regular cervical cancer screening tests starting at age 21.  Breast cancer scan (mammogram): If you've ever had breasts, begin having regular mammograms starting at age 40. This is a scan to check for breast cancer.  Colon cancer screening: It is important to start screening for colon cancer at age 45.  Have a colonoscopy test every 10 years (or more often if you're at risk) Or, ask your provider about stool tests like a FIT test every year or Cologuard test every 3 years.  To learn more about your testing options, visit:   https://www.[employer]/854229.pdf.  For help making a decision, visit:   https://bit.ly/nh89554.  Prostate cancer screening test: If you have a prostate, ask your care team if a prostate cancer screening test (PSA) at age 55 is right for you.  Lung cancer screening: If you are a current or former smoker ages 50 to 80, ask your care team if ongoing lung cancer screenings are right for you.  For informational purposes only. Not to replace the advice of your health care provider. Copyright   2023 [employer]. All rights reserved. Clinically reviewed by the [facility]. [employer] 652486 - REV 01/24.    Hearing Loss: Care Instructions  Overview     Hearing loss is a sudden or slow decrease in how well you hear. It can range from slight to profound. Permanent hearing loss can occur with  aging. It also can happen when you are exposed long-term to loud noise. Examples include listening to loud music, riding motorcycles, or being around other loud machines.  Hearing loss can affect your work and home life. It can make you feel lonely or depressed. You may feel that you have lost your independence. But hearing aids and other devices can help you hear better and feel connected to others.  Follow-up care is a key part of your treatment and safety. Be sure to make and go to all appointments, and call your doctor if you are having problems. It's also a good idea to know your test results and keep a list of the medicines you take.  How can you care for yourself at home?  Avoid loud noises whenever possible. This helps keep your hearing from getting worse.  Always wear hearing protection around loud noises.  Wear a hearing aid as directed.  A professional can help you pick a hearing aid that will work best for you.  You can also get hearing aids over the counter for mild to moderate hearing loss.  Have hearing tests as your doctor suggests. They can show whether your hearing has changed. Your hearing aid may need to be adjusted.  Use other devices as needed. These may include:  Telephone amplifiers and hearing aids that can connect to a television, stereo, radio, or microphone.  Devices that use lights or vibrations. These alert you to the doorbell, a ringing telephone, or a baby monitor.  Television closed-captioning. This shows the words at the bottom of the screen. Most new TVs can do this.  TTY (text telephone). This lets you type messages back and forth on the telephone instead of talking or listening. These devices are also called TDD. When messages are typed on the keyboard, they are sent over the phone line to a receiving TTY. The message is shown on a monitor.  Use text messaging, social media, and email if it is hard for you to communicate by telephone.  Try to learn a listening technique called  "speechreading. It is not lipreading. You pay attention to people's gestures, expressions, posture, and tone of voice. These clues can help you understand what a person is saying. Face the person you are talking to, and have them face you. Make sure the lighting is good. You need to see the other person's face clearly.  Think about counseling if you need help to adjust to your hearing loss.  When should you call for help?  Watch closely for changes in your health, and be sure to contact your doctor if:    You think your hearing is getting worse.     You have new symptoms, such as dizziness or nausea.   Where can you learn more?  Go to https://www.[employer].net/patiented  Enter R798 in the search box to learn more about \"Hearing Loss: Care Instructions.\"  Current as of: February 28, 2023               Content Version: 13.8    0155-6591 [employer].   Care instructions adapted under license by your healthcare professional. If you have questions about a medical condition or this instruction, always ask your healthcare professional. [employer] disclaims any warranty or liability for your use of this information.      Learning About Stress  What is stress?     Stress is your body's response to a hard situation. Your body can have a physical, emotional, or mental response. Stress is a fact of life for most people, and it affects everyone differently. What causes stress for you may not be stressful for someone else.  A lot of things can cause stress. You may feel stress when you go on a job interview, take a test, or run a race. This kind of short-term stress is normal and even useful. It can help you if you need to work hard or react quickly. For example, stress can help you finish an important job on time.  Long-term stress is caused by ongoing stressful situations or events. Examples of long-term stress include long-term health problems, ongoing problems at work, or conflicts in your family. " Long-term stress can harm your health.  How does stress affect your health?  When you are stressed, your body responds as though you are in danger. It makes hormones that speed up your heart, make you breathe faster, and give you a burst of energy. This is called the fight-or-flight stress response. If the stress is over quickly, your body goes back to normal and no harm is done.  But if stress happens too often or lasts too long, it can have bad effects. Long-term stress can make you more likely to get sick, and it can make symptoms of some diseases worse. If you tense up when you are stressed, you may develop neck, shoulder, or low back pain. Stress is linked to high blood pressure and heart disease.  Stress also harms your emotional health. It can make you larson, tense, or depressed. Your relationships may suffer, and you may not do well at work or school.  What can you do to manage stress?  You can try these things to help manage stress:   Do something active. Exercise or activity can help reduce stress. Walking is a great way to get started. Even everyday activities such as housecleaning or yard work can help.  Try yoga or stephen chi. These techniques combine exercise and meditation. You may need some training at first to learn them.  Do something you enjoy. For example, listen to music or go to a movie. Practice your hobby or do volunteer work.  Meditate. This can help you relax, because you are not worrying about what happened before or what may happen in the future.  Do guided imagery. Imagine yourself in any setting that helps you feel calm. You can use online videos, books, or a teacher to guide you.  Do breathing exercises. For example:  From a standing position, bend forward from the waist with your knees slightly bent. Let your arms dangle close to the floor.  Breathe in slowly and deeply as you return to a standing position. Roll up slowly and lift your head last.  Hold your breath for just a few seconds  "in the standing position.  Breathe out slowly and bend forward from the waist.  Let your feelings out. Talk, laugh, cry, and express anger when you need to. Talking with supportive friends or family, a counselor, or a lillian leader about your feelings is a healthy way to relieve stress. Avoid discussing your feelings with people who make you feel worse.  Write. It may help to write about things that are bothering you. This helps you find out how much stress you feel and what is causing it. When you know this, you can find better ways to cope.  What can you do to prevent stress?  You might try some of these things to help prevent stress:  Manage your time. This helps you find time to do the things you want and need to do.  Get enough sleep. Your body recovers from the stresses of the day while you are sleeping.  Get support. Your family, friends, and community can make a difference in how you experience stress.  Limit your news feed. Avoid or limit time on social media or news that may make you feel stressed.  Do something active. Exercise or activity can help reduce stress. Walking is a great way to get started.  Where can you learn more?  Go to https://www.[employer].net/patiented  Enter N032 in the search box to learn more about \"Learning About Stress.\"  Current as of: February 26, 2023               Content Version: 13.8    8659-7424 [employer].   Care instructions adapted under license by your healthcare professional. If you have questions about a medical condition or this instruction, always ask your healthcare professional. [employer] disclaims any warranty or liability for your use of this information.      Learning About Sleeping Well  What does sleeping well mean?     Sleeping well means getting enough sleep to feel good and stay healthy. How much sleep is enough varies among people.  The number of hours you sleep and how you feel when you wake up are both important. If you do not " feel refreshed, you probably need more sleep. Another sign of not getting enough sleep is feeling tired during the day.  Experts recommend that adults get at least 7 or more hours of sleep per day. Children and older adults need more sleep.  Why is getting enough sleep important?  Getting enough quality sleep is a basic part of good health. When your sleep suffers, your physical health, mood, and your thoughts can suffer too. You may find yourself feeling more grumpy or stressed. Not getting enough sleep also can lead to serious problems, including injury, accidents, anxiety, and depression.  What might cause poor sleeping?  Many things can cause sleep problems, including:  Changes to your sleep schedule.  Stress. Stress can be caused by fear about a single event, such as giving a speech. Or you may have ongoing stress, such as worry about work or school.  Depression, anxiety, and other mental or emotional conditions.  Changes in your sleep habits or surroundings. This includes changes that happen where you sleep, such as noise, light, or sleeping in a different bed. It also includes changes in your sleep pattern, such as having jet lag or working a late shift.  Health problems, such as pain, breathing problems, and restless legs syndrome.  Lack of regular exercise.  Using alcohol, nicotine, or caffeine before bed.  How can you help yourself?  Here are some tips that may help you sleep more soundly and wake up feeling more refreshed.  Your sleeping area   Use your bedroom only for sleeping and sex. A bit of light reading may help you fall asleep. But if it doesn't, do your reading elsewhere in the house. Try not to use your TV, computer, smartphone, or tablet while you are in bed.  Be sure your bed is big enough to stretch out comfortably, especially if you have a sleep partner.  Keep your bedroom quiet, dark, and cool. Use curtains, blinds, or a sleep mask to block out light. To block out noise, use earplugs,  "soothing music, or a \"white noise\" machine.  Your evening and bedtime routine   Create a relaxing bedtime routine. You might want to take a warm shower or bath, or listen to soothing music.  Go to bed at the same time every night. And get up at the same time every morning, even if you feel tired.  What to avoid   Limit caffeine (coffee, tea, caffeinated sodas) during the day, and don't have any for at least 6 hours before bedtime.  Avoid drinking alcohol before bedtime. Alcohol can cause you to wake up more often during the night.  Try not to smoke or use tobacco, especially in the evening. Nicotine can keep you awake.  Limit naps during the day, especially close to bedtime.  Avoid lying in bed awake for too long. If you can't fall asleep or if you wake up in the middle of the night and can't get back to sleep within about 20 minutes, get out of bed and go to another room until you feel sleepy.  Avoid taking medicine right before bed that may keep you awake or make you feel hyper or energized. Your doctor can tell you if your medicine may do this and if you can take it earlier in the day.  If you can't sleep   Imagine yourself in a peaceful, pleasant scene. Focus on the details and feelings of being in a place that is relaxing.  Get up and do a quiet or boring activity until you feel sleepy.  Avoid drinking any liquids before going to bed to help prevent waking up often to use the bathroom.  Where can you learn more?  Go to https://www.[employer].net/patiented  Enter J942 in the search box to learn more about \"Learning About Sleeping Well.\"  Current as of: July 11, 2023               Content Version: 13.8    5364-6706 [employer].   Care instructions adapted under license by your healthcare professional. If you have questions about a medical condition or this instruction, always ask your healthcare professional. [employer] disclaims any warranty or liability for your use of this " information.      Learning About Alcohol Use Disorder  What is alcohol use disorder?  Alcohol use disorder means that a person drinks alcohol even though it causes harm to themselves or others. It can range from mild to severe. The more symptoms of this disorder you have, the more severe it may be. People who have it may find it hard to control their use of alcohol.  People who have this disorder may argue with others about how much they're drinking. Their job may be affected because of drinking. They may drink when it's dangerous or illegal, such as when they drive. They also may have a strong need, or craving, to drink. They may feel like they must drink just to get by. Their drinking may increase their risk of getting hurt or being in a car crash.  Over time, drinking too much alcohol may cause health problems. These may include high blood pressure, liver problems, or problems with digestion.  What are the symptoms?  Maybe you've wondered about your alcohol habits or how to tell if your drinking is becoming a problem.  Here are some of the symptoms of alcohol use disorder. You may have it if you have two or more of the following symptoms:  You drink larger amounts of alcohol than you ever meant to. Or you've been drinking for a longer time than you ever meant to.  You can't cut down or control your use. Or you constantly wish you could cut down.  You spend a lot of time getting or drinking alcohol or recovering from its effects.  You have strong cravings for alcohol.  You can no longer do your main jobs at work, at school, or at home.  You keep drinking alcohol, even though your use hurts your relationships.  You have stopped doing important activities because of your alcohol use.  You drink alcohol in situations where doing so is dangerous.  You keep drinking alcohol even though you know it's causing health problems.  You need more and more alcohol to get the same effect, or you get less effect from the same  "amount over time. This is called tolerance.  You have uncomfortable symptoms when you stop drinking alcohol or use less. This is called withdrawal.  Alcohol use disorder can range from mild to severe. The more symptoms you have, the more severe the disorder may be.  You might not realize that your drinking is a problem. You might not drink large amounts when you drink. Or you might go for days or weeks between drinking episodes. But even if you don't drink very often, your drinking could still be harmful and put you at risk.  How is alcohol use disorder treated?  Getting help is up to you. But you don't have to do it alone. There are many people and kinds of treatments that can help.  Treatment for alcohol use disorder can include:  Group therapy, one or more types of counseling, and alcohol education.  Medicines that help to:  Reduce withdrawal symptoms and help you safely stop drinking.  Reduce cravings for alcohol.  Support groups. These groups include Alcoholics Anonymous and PingMD (Self-Management and Recovery Training).  Some people are able to stop or cut back on drinking with help from a counselor. People who have moderate to severe alcohol use disorder may need medical treatment. They may need to stay in a hospital or treatment center.  You may have a treatment team to help you. This team may include a psychologist or psychiatrist, counselors, doctors, social workers, nurses, and a . A  helps plan and manage your treatment.  Follow-up care is a key part of your treatment and safety. Be sure to make and go to all appointments, and call your doctor if you are having problems. It's also a good idea to know your test results and keep a list of the medicines you take.  Where can you learn more?  Go to https://www.healthwise.net/patiented  Enter H758 in the search box to learn more about \"Learning About Alcohol Use Disorder.\"  Current as of: March 21, 2023               Content " Version: 13.8 2006-2023 [employer].   Care instructions adapted under license by your healthcare professional. If you have questions about a medical condition or this instruction, always ask your healthcare professional. [employer] disclaims any warranty or liability for your use of this information.

## 2024-02-22 NOTE — PROGRESS NOTES
"Preventive Care Visit  [facility] [place]  Nash Walker MD, Family Medicine  Feb 22, 2024      Assessment & Plan     Encounter for Medicare annual wellness exam      Hypertension goal BP (blood pressure) < 130/80  Controlled - continue medication(s).  - CBC with Platelets  - COMPREHENSIVE METABOLIC PANEL  - Albumin Random Urine Quantitative with Creat Ratio  - amLODIPine (NORVASC) 10 MG tablet  Dispense: 45 tablet; Refill: 4  - hydrochlorothiazide (HYDRODIURIL) 25 MG tablet  Dispense: 90 tablet; Refill: 4  - losartan (COZAAR) 50 MG tablet  Dispense: 90 tablet; Refill: 4  - potassium chloride abdiel ER (KLOR-CON) 20 MEQ CR tablet  Dispense: 90 tablet; Refill: 4  - CBC with Platelets  - COMPREHENSIVE METABOLIC PANEL  - Albumin Random Urine Quantitative with Creat Ratio    Hyperlipidemia LDL goal <100  Controlled - continue medication(s).  - Lipid panel reflex to direct LDL Non-fasting  - atorvastatin (LIPITOR) 10 MG tablet  Dispense: 90 tablet; Refill: 4  - Lipid panel reflex to direct LDL Non-fasting    Screening for prostate cancer  - PROSTATE SPEC ANTIGEN SCREEN  - PROSTATE SPEC ANTIGEN SCREEN    Raynaud's phenomenon without gangrene  Ongoing symptoms, recommended warm clothing gloves etc.  Continue medications.  - amLODIPine (NORVASC) 10 MG tablet  Dispense: 45 tablet; Refill: 4    Seasonal allergic rhinitis, unspecified trigger  Meds to use as needed:  - fluticasone (FLONASE) 50 MCG/ACT nasal spray  Dispense: 48 g; Refill: 4    h/o prostate cancer (H) - S/p prostatectomy at 54 yo (2001)      BMI  Estimated body mass index is 26.54 kg/m  as calculated from the following:    Height as of this encounter: 1.715 m (5' 7.5\").    Weight as of this encounter: 78 kg (172 lb).       Reviewed preventive health counseling, as reflected in patient instructions    No follow-ups on file.    Follow-up Visit   Expected date:  Feb 28, 2025 (Approximate)      Follow Up Appointment Details:     Follow-up with " whom?: PCP    Follow-Up for what?: Medicare Wellness    Any Additional Chronic Condition Management?:  Hypertension  Hyperlipidemia       Welcome or Annual?: Annual Wellness    How?: In Person                         Joaquin Walker MD     [address] [address]  [place]     Office: [phone]         Eitan Waldron is a 78 year old, presenting for the following:  Physical (Medicare - Fasting)        Health Care Directive  Patient does not have a Health Care Directive or Living Will: Patient states has Advance Directive and will bring in a copy to clinic.    HPI    Hyperlipidemia Follow-Up    Are you regularly taking any medication or supplement to lower your cholesterol?   Yes- atorvastatin (LIPITOR) 10 MG tablet  Are you having muscle aches or other side effects that you think could be caused by your cholesterol lowering medication?  No    Hypertension Follow-up    Do you check your blood pressure regularly outside of the clinic? Yes   Are you following a low salt diet? Yes, tried to  Are your blood pressures ever more than 140 on the top number (systolic) OR more   than 90 on the bottom number (diastolic), for example 140/90? No    BP Readings from Last 2 Encounters:   02/22/24 114/58   12/18/23 114/60     Hemoglobin A1C (%)   Date Value   05/22/2023 5.4     LDL Cholesterol Calculated (mg/dL)   Date Value   02/21/2023 81   02/17/2022 82   01/26/2021 89   10/15/2020 80           2/15/2024   General Health   How would you rate your overall physical health? Excellent   Feel stress (tense, anxious, or unable to sleep) Only a little   (!) STRESS CONCERN      2/15/2024   Nutrition   Diet: Regular (no restrictions)         2/15/2024   Exercise   Days per week of moderate/strenous exercise 5 days   Average minutes spent exercising at this level 30 min         2/15/2024   Social Factors   Frequency of gathering with friends or relatives Three times a week    Worry food won't last until get money to buy more No   Food not last or not have enough money for food? No   Do you have housing?  Yes   Are you worried about losing your housing? No   Lack of transportation? No   Unable to get utilities (heat,electricity)? No         2/15/2024   Fall Risk   Fallen 2 or more times in the past year? No    No   Trouble with walking or balance? No    No          2/15/2024   Activities of Daily Living- Home Safety   Needs help with the following daily activites None of the above   Safety concerns in the home None of the above         2/15/2024   Dental   Dentist two times every year? Yes         2/15/2024   Hearing Screening   Hearing concerns? (!) IT'S HARD TO FOLLOW A CONVERSATION IN A NOISY RESTAURANT OR CROWDED ROOM.         2/15/2024   Driving Risk Screening   Patient/family members have concerns about driving No         2/15/2024   General Alertness/Fatigue Screening   Have you been more tired than usual lately? (!) YES         2/15/2024   Urinary Incontinence Screening   Bothered by leaking urine in past 6 months No         2/15/2024   TB Screening   Were you born outside of [place]?  No     Today's PHQ-2 Score:       2/22/2024     8:59 AM   PHQ-2 ( 1999 [employer])   Q1: Little interest or pleasure in doing things 0   Q2: Feeling down, depressed or hopeless 0   PHQ-2 Score 0   Q1: Little interest or pleasure in doing things Not at all   Q2: Feeling down, depressed or hopeless Not at all   PHQ-2 Score 0         2/15/2024   Substance Use   Alcohol more than 3/day or more than 7/wk Yes   How often do you have a drink containing alcohol 4 or more times a week   How many alcohol drinks on typical day 1 or 2   How often do you have 5+ drinks at one occasion Never   Audit 2/3 Score 0   How often not able to stop drinking once started Never   How often failed to do what normally expected Never   How often needed first drink in am after a heavy drinking session Never   How often feeling of  guilt or remorse after drinking Never   How often unable to remember what happened the night before Never   Have you or someone else been injured because of your drinking No   Has anyone been concerned or suggested you cut down on drinking No   TOTAL SCORE - AUDIT 4   Do you have a current opioid prescription? No   How severe/bad is pain from 1 to 10? 0/10 (No Pain)   Do you use any other substances recreationally? No     Social History     Tobacco Use    Smoking status: Never    Smokeless tobacco: Never   Vaping Use    Vaping Use: Never used   Substance Use Topics    Alcohol use: Yes     Alcohol/week: 11.7 standard drinks of alcohol     Comment: 1-2 beers nightly    Drug use: No               Reviewed and updated as needed this visit by Provider   Tobacco  Allergies  Meds  Problems  Med Hx  Surg Hx  Fam Hx     Sexual Activity          Current providers sharing in care for this patient include:  Patient Care Team:  Nash Walker MD as PCP - General  Garrett Gonzalez MD as MD (Urology)  Aurora Mckeon, RN as Specialty Care Coordinator (Urology)  Josué Gutiérrez DO as MD (Family Practice)  Franky Oneal MD as MD (Urology)  Nash Walker MD as Assigned PCP  Travis Amaya MD as Assigned Neuroscience Provider    The following health maintenance items are reviewed in Epic and correct as of today:  Health Maintenance   Topic Date Due    RSV VACCINE (Pregnancy & 60+) (1 - 1-dose 60+ series) Never done    COVID-19 Vaccine (4 - 2023-24 season) 09/01/2023    CMP  02/21/2024    LIPID  02/21/2024    MICROALBUMIN  02/21/2024    PSA  02/21/2024    INFLUENZA VACCINE (1) 06/30/2024 (Originally 9/1/2023)    MEDICARE ANNUAL WELLNESS VISIT  02/22/2025    ANNUAL REVIEW OF HM ORDERS  02/22/2025    FALL RISK ASSESSMENT  02/22/2025    CBC  02/22/2025    GLUCOSE  05/22/2026    ADVANCE CARE PLANNING  02/21/2028    DTAP/TDAP/TD IMMUNIZATION (4 - Td or Tdap) 11/23/2032    HEPATITIS C SCREENING  Completed    PHQ-2  "(once per calendar year)  Completed    Pneumococcal Vaccine: 65+ Years  Completed    ZOSTER IMMUNIZATION  Addressed    IPV IMMUNIZATION  Aged Out    HPV IMMUNIZATION  Aged Out    MENINGITIS IMMUNIZATION  Aged Out    RSV MONOCLONAL ANTIBODY  Aged Out    COLORECTAL CANCER SCREENING  Discontinued          Objective    Exam  /58   Pulse 60   Temp (!) 96.6  F (35.9  C) (Tympanic)   Resp 13   Ht 1.715 m (5' 7.5\")   Wt 78 kg (172 lb)   SpO2 100%   BMI 26.54 kg/m     Estimated body mass index is 26.54 kg/m  as calculated from the following:    Height as of this encounter: 1.715 m (5' 7.5\").    Weight as of this encounter: 78 kg (172 lb).    Physical Exam  GENERAL: healthy, alert and no distress  EYES: Eyes grossly normal to inspection, PERRL and conjunctivae and sclerae normal  HENT: ear canals and TM's normal, nose and mouth without ulcers or lesions  NECK: no adenopathy, no asymmetry, masses, or scars and thyroid normal to palpation  RESP: lungs clear to auscultation - no rales, rhonchi or wheezes  BREAST: normal without masses, tenderness or nipple discharge and no palpable axillary masses or adenopathy  CV: regular rate and rhythm, normal S1 S2, no S3 or S4, no murmur, click or rub, no peripheral edema and peripheral pulses strong  ABDOMEN: soft, nontender, no hepatosplenomegaly, no masses and bowel sounds normal   (male): normal male genitalia without lesions or urethral discharge, no hernia  MS: no gross musculoskeletal defects noted, no edema  SKIN: no suspicious lesions or rashes  NEURO: Normal strength and tone, mentation intact and speech normal  PSYCH: mentation appears normal, affect normal/bright  LYMPH: no cervical, supraclavicular, axillary, or inguinal adenopathy  RECTAL: declined exam           2/22/2024   Mini Cog   Clock Draw Score 2 Normal   3 Item Recall 2 objects recalled   Mini Cog Total Score 4          Signed Electronically by: Nash Walker MD    "

## 2024-02-23 NOTE — RESULT ENCOUNTER NOTE
Dear Chivo,    Here is a summary of your recent test results:  -Normal red blood cell (hgb) levels, normal white blood cell count and normal platelet levels.  -PSA (prostate specific antigen) test is normal.  This indicates a low likelihood of prostate cancer.  ADVISE: rechecking this in 1 year.  -Cholesterol levels are at your goal levels.  ADVISE: continuing your medication, a regular exercise program with at least 150 minutes of aerobic exercise per week, and eating a low saturated fat/low carbohydrate diet.  Also, you should recheck this fasting cholesterol panel in 12 months.  -Liver and gallbladder tests (ALT,AST, Alk phos,bilirubin) are normal.  -Kidney function (GFR) is normal.  -Sodium is normal.  -Potassium is normal.  -Calcium is normal.  -Glucose is slight elevated and may be a sign of early diabetes (prediabetes). ADVISE:: eating a low carbohydrate diet, exercising, trying to lose weight (if necessary) and rechecking your glucose level in 12 months.  -Microalbumin (urine protein) test is normal.  ADVISE: rechecking this annually.    For additional lab test information, www.testing.com is a very good reference.    In addition, here is a list of due or overdue Health Maintenance reminders:  RSV VACCINE (Pregnancy & 60+)(1 - 1-dose 60+ series) Never done  COVID-19 Vaccine(4 - 2023-24 season) due on 09/01/2023    Please call us at [phone] (or use [employer]) to address the above recommendations if needed.           Thank you very much for trusting me and [facility].     Have a peaceful day.    Healthy regards,  Joaquin Walker MD

## 2024-03-02 ENCOUNTER — OFFICE VISIT (OUTPATIENT)
Dept: URGENT CARE | Facility: URGENT CARE | Age: 79
End: 2024-03-02
Payer: MEDICARE

## 2024-03-02 VITALS
HEART RATE: 81 BPM | DIASTOLIC BLOOD PRESSURE: 73 MMHG | SYSTOLIC BLOOD PRESSURE: 121 MMHG | BODY MASS INDEX: 26.83 KG/M2 | RESPIRATION RATE: 14 BRPM | WEIGHT: 173.9 LBS | TEMPERATURE: 98.4 F | OXYGEN SATURATION: 96 %

## 2024-03-02 DIAGNOSIS — J01.90 ACUTE SINUSITIS, RECURRENCE NOT SPECIFIED, UNSPECIFIED LOCATION: Primary | ICD-10-CM

## 2024-03-02 LAB
DEPRECATED S PYO AG THROAT QL EIA: NEGATIVE
FLUAV AG SPEC QL IA: NEGATIVE
FLUBV AG SPEC QL IA: NEGATIVE

## 2024-03-02 PROCEDURE — 99213 OFFICE O/P EST LOW 20 MIN: CPT | Performed by: FAMILY MEDICINE

## 2024-03-02 PROCEDURE — 87804 INFLUENZA ASSAY W/OPTIC: CPT | Performed by: FAMILY MEDICINE

## 2024-03-02 PROCEDURE — 87651 STREP A DNA AMP PROBE: CPT | Performed by: FAMILY MEDICINE

## 2024-03-02 RX ORDER — DOXYCYCLINE 100 MG/1
100 CAPSULE ORAL 2 TIMES DAILY
Qty: 20 CAPSULE | Refills: 0 | Status: SHIPPED | OUTPATIENT
Start: 2024-03-02 | End: 2024-03-12

## 2024-03-02 NOTE — PROGRESS NOTES
Assessment & Plan     Acute sinusitis, recurrence not specified, unspecified location  Neg strep, neg flu  Doxy for sinus  - doxycycline hyclate (VIBRAMYCIN) 100 MG capsule  Dispense: 20 capsule; Refill: 0             No follow-ups on file.    Connor Gibbs MD  [facility] ALECIA Waldron is a 78 year old male who presents to clinic today for the following health issues:  Chief Complaint   Patient presents with    Urgent Care    Sinus Problem     Pt reports cough, runny nose, snot,  sore throat and sinus pressure post nasal drainage x 2 weeks  - tried Mucinex, rubs Elvis and Humidifier at night - Took Tylenol at 8 am     HPI    Concern about sinus.  Started with cold symptoms. Green snot and ST and facial pressure.  Worse at night with pressure and cough.  Mucinex/humidifier.          Review of Systems        Objective    /73 (BP Location: Right arm, Patient Position: Sitting, Cuff Size: Adult Large)   Pulse 81   Temp 98.4  F (36.9  C) (Tympanic)   Resp 14   Wt 78.9 kg (173 lb 14.4 oz)   SpO2 96%   BMI 26.83 kg/m    Physical Exam  Vitals and nursing note reviewed.   Constitutional:       Appearance: Normal appearance.   HENT:      Right Ear: Tympanic membrane and ear canal normal.      Left Ear: Tympanic membrane and ear canal normal.      Mouth/Throat:      Mouth: Mucous membranes are moist.   Eyes:      Pupils: Pupils are equal, round, and reactive to light.   Cardiovascular:      Rate and Rhythm: Normal rate and regular rhythm.      Pulses: Normal pulses.      Heart sounds: Normal heart sounds.   Pulmonary:      Effort: Pulmonary effort is normal.      Breath sounds: Normal breath sounds.   Musculoskeletal:      Cervical back: Neck supple.   Neurological:      Mental Status: He is alert.

## 2024-03-03 LAB — GROUP A STREP BY PCR: NOT DETECTED

## 2024-04-15 ENCOUNTER — MYC MEDICAL ADVICE (OUTPATIENT)
Dept: FAMILY MEDICINE | Facility: CLINIC | Age: 79
End: 2024-04-15
Payer: MEDICARE

## 2024-04-15 NOTE — TELEPHONE ENCOUNTER
Called patient regarding mychart message     LOV 3/2/24- acute sinusitis    Doxycycline Hyclate 100 mg Oral 2 TIMES DAILY    Patient states he still has pressure in the nose.   Yellow/green nasal drainage   Slight headaches   Coughing at night.     No fevers    Scheduled. Advised of location, arrival and apt time.

## 2024-04-18 ENCOUNTER — OFFICE VISIT (OUTPATIENT)
Dept: FAMILY MEDICINE | Facility: CLINIC | Age: 79
End: 2024-04-18
Payer: MEDICARE

## 2024-04-18 VITALS
OXYGEN SATURATION: 93 % | SYSTOLIC BLOOD PRESSURE: 116 MMHG | WEIGHT: 176 LBS | RESPIRATION RATE: 12 BRPM | BODY MASS INDEX: 27.16 KG/M2 | DIASTOLIC BLOOD PRESSURE: 68 MMHG | HEART RATE: 74 BPM | TEMPERATURE: 96.6 F

## 2024-04-18 DIAGNOSIS — J32.9 SINUSITIS, UNSPECIFIED CHRONICITY, UNSPECIFIED LOCATION: Primary | ICD-10-CM

## 2024-04-18 PROCEDURE — 99213 OFFICE O/P EST LOW 20 MIN: CPT | Performed by: FAMILY MEDICINE

## 2024-04-18 RX ORDER — AZITHROMYCIN 250 MG/1
TABLET, FILM COATED ORAL
Qty: 6 TABLET | Refills: 0 | Status: SHIPPED | OUTPATIENT
Start: 2024-04-18

## 2024-04-18 RX ORDER — OMEGA-3/DHA/EPA/FISH OIL 60 MG-90MG
CAPSULE ORAL
COMMUNITY

## 2024-04-18 NOTE — PROGRESS NOTES
Assessment & Plan   Sinusitis, unspecified chronicity, unspecified location  Persistent sinus pressure despite doxycycline course.  He thought he maybe felt slightly better.  Ongoing pressure, no fevers, allergic to penicillin, continue with fluticasone.  Previous antihistamines tried about and caused headaches  - azithromycin (ZITHROMAX) 250 MG tablet  Dispense: 6 tablet; Refill: 0          Return in about 3 weeks (around 5/9/2024) for symptoms failing to improve or sooner if worsening.          Joaquin Walker MD      [address] [address]  [email or web address]   Office: [phone]         Eitan Waldron is a 79 year old, presenting for the following health issues:  Sinus Problem    History of Present Illness       Reason for visit:  Sinus infection    He eats 2-3 servings of fruits and vegetables daily.He consumes 0 sweetened beverage(s) daily.He exercises with enough effort to increase his heart rate 30 to 60 minutes per day.  He exercises with enough effort to increase his heart rate 6 days per week.   He is taking medications regularly.     Acute Illness  Acute illness concerns: Sinus Problem  Onset/Duration: x 5 weeks  Symptoms:  Fever: No  Chills/Sweats: No  Headache (location?): YES  Sinus Pressure: YES  Conjunctivitis:  No  Ear Pain: no  Rhinorrhea: YES  Congestion: YES  Sore Throat: YES  Cough: YES-non-productive, waxing and waning over time  Wheeze: No  Decreased Appetite: No  Nausea: No  Vomiting: No  Diarrhea: No  Dysuria/Freq.: No  Dysuria or Hematuria: No  Fatigue/Achiness: YES- little bit  Sick/Strep Exposure: No  Therapies tried and outcome: Mucinex, Vit C, Tylenol, nedipot - took doxycyline 100 mg bid for 10 days - maybe slight better.       Objective    /68   Pulse 74   Temp (!) 96.6  F (35.9  C) (Tympanic)   Resp 12   Wt 79.8 kg (176 lb)   SpO2 93%   BMI 27.16 kg/m    Body mass index is 27.16 kg/m .  Physical Exam   GENERAL: no  acute distress  EYES: Conjunctiva are not injected, no discharge.  EARS: Left TM -no erythema, no effusion,  not bulged.               Right TM -no erythema, no effusion,  not bulged.  NOSE: no discharge, no sinus tenderness  THROAT: no erythema, no exudate, no lesions  NECK: supple, no adenopathy.  CARDIAC: regular rate and rhythm, no murmur  RESP: clear, no wheezing, no rales, no rhonchi  ABD: soft, no distension, no tenderness  SKIN: No rashes        Signed Electronically by: Nash Walker MD

## 2025-02-20 ENCOUNTER — E-VISIT (OUTPATIENT)
Dept: URGENT CARE | Facility: CLINIC | Age: 80
End: 2025-02-20
Payer: MEDICARE

## 2025-02-20 DIAGNOSIS — J01.90 ACUTE SINUSITIS WITH SYMPTOMS > 10 DAYS: Primary | ICD-10-CM

## 2025-02-20 RX ORDER — DOXYCYCLINE HYCLATE 100 MG
100 TABLET ORAL 2 TIMES DAILY
Qty: 14 TABLET | Refills: 0 | Status: SHIPPED | OUTPATIENT
Start: 2025-02-20 | End: 2025-02-27

## 2025-02-20 NOTE — PATIENT INSTRUCTIONS
Dear Steven Duong    After reviewing your responses, I've been able to diagnose you with Acute sinusitis with symptoms > 10 days.      Based on your responses and diagnosis, I have prescribed   Orders Placed This Encounter   Medications     doxycycline hyclate (VIBRA-TABS) 100 MG tablet     Sig: Take 1 tablet (100 mg) by mouth 2 times daily for 7 days.     Dispense:  14 tablet     Refill:  0     May substitute any formulation of 100mg doxycycline available and best covered by insurance      to treat your symptoms. I have sent this to your pharmacy.?     It is also important to stay well hydrated, get lots of rest and take over-the-counter decongestants,?tylenol?or ibuprofen if you?are able to?take those medications per your primary care provider to help relieve discomfort.?     It is important that you take?all of?your prescribed medication even if your symptoms are improving after a few doses.? Taking?all of?your medicine helps prevent the symptoms from returning.?     If your symptoms worsen, you develop severe headache, vomiting, high fever (>102), or are not improving in 7 days, please contact your primary care provider for an appointment or visit any of our convenient Walk-in Care or Urgent Care Centers to be seen which can be found on our website?here.?     Thanks again for choosing?us?as your health care partner,?   ?  Crsitina Molina PA-C?

## 2025-02-28 DIAGNOSIS — I73.00 RAYNAUD'S PHENOMENON WITHOUT GANGRENE: ICD-10-CM

## 2025-02-28 DIAGNOSIS — E78.5 HYPERLIPIDEMIA LDL GOAL <100: ICD-10-CM

## 2025-02-28 DIAGNOSIS — J30.2 SEASONAL ALLERGIC RHINITIS, UNSPECIFIED TRIGGER: ICD-10-CM

## 2025-02-28 DIAGNOSIS — I10 HYPERTENSION GOAL BP (BLOOD PRESSURE) < 130/80: ICD-10-CM

## 2025-02-28 NOTE — TELEPHONE ENCOUNTER
Pt calling to request medication rxs & refill to be transferred to [facility] pharmacy.     Please advise sending:  Amlodipine  Atorvastatin   Fluticasone  Hydrochlorothiazide  Losartan  Potassium     To pharmacy desired attached.

## 2025-03-01 RX ORDER — HYDROCHLOROTHIAZIDE 25 MG/1
25 TABLET ORAL DAILY
Qty: 90 TABLET | Refills: 4 | Status: SHIPPED | OUTPATIENT
Start: 2025-03-01

## 2025-03-01 RX ORDER — AMLODIPINE BESYLATE 5 MG/1
5 TABLET ORAL DAILY
Qty: 90 TABLET | Refills: 4 | Status: SHIPPED | OUTPATIENT
Start: 2025-03-01

## 2025-03-01 RX ORDER — FLUTICASONE PROPIONATE 50 MCG
1-2 SPRAY, SUSPENSION (ML) NASAL DAILY
Qty: 48 G | Refills: 4 | Status: SHIPPED | OUTPATIENT
Start: 2025-03-01

## 2025-03-01 RX ORDER — ATORVASTATIN CALCIUM 10 MG/1
10 TABLET, FILM COATED ORAL DAILY
Qty: 90 TABLET | Refills: 4 | Status: SHIPPED | OUTPATIENT
Start: 2025-03-01

## 2025-03-01 RX ORDER — POTASSIUM CHLORIDE 1500 MG/1
20 TABLET, EXTENDED RELEASE ORAL DAILY
Qty: 90 TABLET | Refills: 4 | Status: SHIPPED | OUTPATIENT
Start: 2025-03-01

## 2025-03-01 RX ORDER — LOSARTAN POTASSIUM 50 MG/1
50 TABLET ORAL DAILY
Qty: 90 TABLET | Refills: 4 | Status: SHIPPED | OUTPATIENT
Start: 2025-03-01

## 2025-03-13 ENCOUNTER — LAB (OUTPATIENT)
Dept: LAB | Facility: CLINIC | Age: 80
End: 2025-03-13
Payer: MEDICARE

## 2025-03-13 DIAGNOSIS — I10 HYPERTENSION GOAL BP (BLOOD PRESSURE) < 130/80: ICD-10-CM

## 2025-03-13 DIAGNOSIS — Z13.0 SCREENING, DEFICIENCY ANEMIA, IRON: ICD-10-CM

## 2025-03-13 DIAGNOSIS — C61 PROSTATE CANCER (H): ICD-10-CM

## 2025-03-13 DIAGNOSIS — E78.5 HYPERLIPIDEMIA LDL GOAL <100: ICD-10-CM

## 2025-03-13 LAB
CREAT UR-MCNC: 79.3 MG/DL
ERYTHROCYTE [DISTWIDTH] IN BLOOD BY AUTOMATED COUNT: 12.6 % (ref 10–15)
HCT VFR BLD AUTO: 45.6 % (ref 40–53)
HGB BLD-MCNC: 15.8 G/DL (ref 13.3–17.7)
MCH RBC QN AUTO: 31.9 PG (ref 26.5–33)
MCHC RBC AUTO-ENTMCNC: 34.6 G/DL (ref 31.5–36.5)
MCV RBC AUTO: 92 FL (ref 78–100)
MICROALBUMIN UR-MCNC: <12 MG/L
MICROALBUMIN/CREAT UR: NORMAL MG/G{CREAT}
PLATELET # BLD AUTO: 183 10E3/UL (ref 150–450)
RBC # BLD AUTO: 4.96 10E6/UL (ref 4.4–5.9)
WBC # BLD AUTO: 5.8 10E3/UL (ref 4–11)

## 2025-03-14 ENCOUNTER — MYC MEDICAL ADVICE (OUTPATIENT)
Dept: FAMILY MEDICINE | Facility: CLINIC | Age: 80
End: 2025-03-14
Payer: MEDICARE

## 2025-03-14 DIAGNOSIS — Z13.1 SCREENING FOR DIABETES MELLITUS: Primary | ICD-10-CM

## 2025-03-15 LAB
ALBUMIN SERPL BCG-MCNC: 4.1 G/DL (ref 3.5–5.2)
ALP SERPL-CCNC: 56 U/L (ref 40–150)
ALT SERPL W P-5'-P-CCNC: 18 U/L (ref 0–70)
ANION GAP SERPL CALCULATED.3IONS-SCNC: 11 MMOL/L (ref 7–15)
AST SERPL W P-5'-P-CCNC: 22 U/L (ref 0–45)
BILIRUB SERPL-MCNC: 1.1 MG/DL
BUN SERPL-MCNC: 15.6 MG/DL (ref 8–23)
CALCIUM SERPL-MCNC: 9.1 MG/DL (ref 8.8–10.4)
CHLORIDE SERPL-SCNC: 101 MMOL/L (ref 98–107)
CREAT SERPL-MCNC: 0.95 MG/DL (ref 0.67–1.17)
EGFRCR SERPLBLD CKD-EPI 2021: 82 ML/MIN/1.73M2
FASTING STATUS PATIENT QL REPORTED: YES
GLUCOSE SERPL-MCNC: 128 MG/DL (ref 70–99)
HCO3 SERPL-SCNC: 26 MMOL/L (ref 22–29)
POTASSIUM SERPL-SCNC: 4.1 MMOL/L (ref 3.4–5.3)
PROT SERPL-MCNC: 6.8 G/DL (ref 6.4–8.3)
SODIUM SERPL-SCNC: 138 MMOL/L (ref 135–145)

## 2025-03-20 ENCOUNTER — OFFICE VISIT (OUTPATIENT)
Dept: FAMILY MEDICINE | Facility: CLINIC | Age: 80
End: 2025-03-20
Payer: MEDICARE

## 2025-03-20 VITALS
TEMPERATURE: 97.3 F | OXYGEN SATURATION: 98 % | DIASTOLIC BLOOD PRESSURE: 60 MMHG | BODY MASS INDEX: 25.89 KG/M2 | RESPIRATION RATE: 20 BRPM | SYSTOLIC BLOOD PRESSURE: 120 MMHG | HEART RATE: 98 BPM | WEIGHT: 167.8 LBS

## 2025-03-20 DIAGNOSIS — R09.81 CONGESTION OF PARANASAL SINUS: ICD-10-CM

## 2025-03-20 DIAGNOSIS — M79.671 RIGHT FOOT PAIN: Primary | ICD-10-CM

## 2025-03-20 NOTE — PROGRESS NOTES
Assessment & Plan   Right foot pain  - Likely bruised or strained from cycling. No indication of a fracture.  - Recommend using a metatarsal pad for support (this is incorporated in a Dr. Jennings's custom fit three-quarter length insole). Continue using the bike with caution. Hold off on X-ray for now.    Congestion of paranasal sinus  - No signs of significant infection in the ear. Old changes noted, possibly from past perforation.  -Continue Flonase.  Avoid quick head movements to minimize vertigo symptoms.   Consent was obtained from the patient to use an AI documentation tool in the creation of this note.    Return in about 3 weeks (around 4/10/2025) for symptoms failing to improve or sooner if worsening.          Joaquin Walker MD      [address] [address]  [email or web address]   Office: [phone]         Subjective   Chivo is a 79 year old, presenting for the following health issues:  Musculoskeletal Problem        3/20/2025     8:48 AM   Additional Questions   History of Present Illness  Consent was obtained from the patient to use an AI documentation tool in the creation of this note.    Roomed by jalen hutton   Accompanied by self     History of Present Illness       Reason for visit:  Foot pain and ears    He eats 2-3 servings of fruits and vegetables daily.He consumes 0 sweetened beverage(s) daily.He exercises with enough effort to increase his heart rate 10 to 19 minutes per day.  He exercises with enough effort to increase his heart rate 5 days per week.   He is taking medications regularly.   Chivo Duong, 79 years    Sinus Infection and Vertigo  Reported experiencing symptoms of a sinus infection for the last few days, with a history of sinus infection treated with antibiotics last month. The infection resolved, but pressure symptoms persisted. Currently experiencing some nasal congestion, though not severe, and occasional nosebleeds attributed to  rubbing. Noted a small amount of blood in the ear, possibly related to pressure. Also experiencing vertigo, managed with slow movements and specific exercises to alleviate symptoms.    Right Foot Pain  Described pain in the right foot, particularly around the toes, following a change in blood pressure medication intended to improve circulation. The pain began a couple of days after a physical exam and was exacerbated by cycling on an indoor bike. The he or she suspects bruising from the activity. No knee pain reported. The pain is localized to the bottom of the foot, with some tenderness noted.    Joint Pain  Experiencing stiffness and tenderness in the knuckles, particularly in the right hand. Symptoms alternate between hands each winter. The right third and fourth interphalangeal joints are tender to the touch. The he or she has a history of similar symptoms over the past three years.    Patient also wants his ears checked    Concern - right foot   Onset: sore foot   Description: toes are stiff and patient thinks foot is bruised   Intensity: moderate  Progression of Symptoms:  mild to moderate pain and its constant  Accompanying Signs & Symptoms: patient is hoping its hoping its not a circulation problem  Previous history of similar problem: yes its was in toes not the bottom of foot  Precipitating factors:        Worsened by: NONE  Alleviating factors:        Improved by: NO  Therapies tried and outcome: advil        Objective    /60   Pulse 98   Temp 97.3  F (36.3  C) (Tympanic)   Resp 20   Wt 76.1 kg (167 lb 12.8 oz)   SpO2 98%   BMI 25.89 kg/m    Body mass index is 25.89 kg/m .  Physical Exam   GENERAL: alert and no distress  NECK: no adenopathy, no asymmetry, masses, or scars  RESP: lungs clear to auscultation - no rales, rhonchi or wheezes  CV: regular rate and rhythm, normal S1 S2, no S3 or S4, no murmur, click or rub, no peripheral edema  ABDOMEN: soft, nontender, no hepatosplenomegaly, no  masses and bowel sounds normal  MS: Right foot has some tenderness in the forefoot, no signs of definite Jones's neuroma like symptoms, she is some tenderness at the third MTP region.  No deformity and no pain with axial load otherwise no gross musculoskeletal defects noted, no edema     The longitudinal plan of care for the diagnosis(es)/condition(s) as documented were addressed during this visit. Due to the added complexity in care, I will continue to support Chivo in the subsequent management and with ongoing continuity of care.      Signed Electronically by: Nash Walker MD